# Patient Record
Sex: MALE | Race: WHITE | NOT HISPANIC OR LATINO | Employment: OTHER | URBAN - METROPOLITAN AREA
[De-identification: names, ages, dates, MRNs, and addresses within clinical notes are randomized per-mention and may not be internally consistent; named-entity substitution may affect disease eponyms.]

---

## 2017-03-28 ENCOUNTER — ALLSCRIPTS OFFICE VISIT (OUTPATIENT)
Dept: OTHER | Facility: OTHER | Age: 72
End: 2017-03-28

## 2017-03-28 DIAGNOSIS — Z77.090 CONTACT WITH AND (SUSPECTED) EXPOSURE TO ASBESTOS: ICD-10-CM

## 2017-05-10 ENCOUNTER — ALLSCRIPTS OFFICE VISIT (OUTPATIENT)
Dept: OTHER | Facility: OTHER | Age: 72
End: 2017-05-10

## 2017-05-10 DIAGNOSIS — M48.061 SPINAL STENOSIS OF LUMBAR REGION: ICD-10-CM

## 2017-05-10 DIAGNOSIS — M54.12 RADICULOPATHY OF CERVICAL REGION: ICD-10-CM

## 2017-06-07 ENCOUNTER — ALLSCRIPTS OFFICE VISIT (OUTPATIENT)
Dept: OTHER | Facility: OTHER | Age: 72
End: 2017-06-07

## 2017-06-16 ENCOUNTER — HOSPITAL ENCOUNTER (OUTPATIENT)
Dept: MRI IMAGING | Facility: CLINIC | Age: 72
Discharge: HOME | End: 2017-06-16
Payer: COMMERCIAL

## 2017-06-16 ENCOUNTER — APPOINTMENT (OUTPATIENT)
Dept: MRI IMAGING | Facility: CLINIC | Age: 72
End: 2017-06-16
Payer: COMMERCIAL

## 2017-06-16 DIAGNOSIS — M54.12 RADICULOPATHY OF CERVICAL REGION: ICD-10-CM

## 2017-06-16 PROCEDURE — 72141 MRI NECK SPINE W/O DYE: CPT

## 2017-06-22 ENCOUNTER — GENERIC CONVERSION - ENCOUNTER (OUTPATIENT)
Dept: OTHER | Facility: OTHER | Age: 72
End: 2017-06-22

## 2017-06-22 ENCOUNTER — HOSPITAL ENCOUNTER (OUTPATIENT)
Dept: MRI IMAGING | Facility: CLINIC | Age: 72
Discharge: HOME | End: 2017-06-22
Payer: COMMERCIAL

## 2017-06-22 DIAGNOSIS — M48.061 SPINAL STENOSIS OF LUMBAR REGION: ICD-10-CM

## 2017-06-22 PROCEDURE — 72148 MRI LUMBAR SPINE W/O DYE: CPT

## 2017-07-07 ENCOUNTER — ALLSCRIPTS OFFICE VISIT (OUTPATIENT)
Dept: OTHER | Facility: OTHER | Age: 72
End: 2017-07-07

## 2017-07-12 ENCOUNTER — ALLSCRIPTS OFFICE VISIT (OUTPATIENT)
Dept: OTHER | Facility: OTHER | Age: 72
End: 2017-07-12

## 2017-08-15 ENCOUNTER — ALLSCRIPTS OFFICE VISIT (OUTPATIENT)
Dept: OTHER | Facility: OTHER | Age: 72
End: 2017-08-15

## 2017-08-15 DIAGNOSIS — R00.1 BRADYCARDIA: ICD-10-CM

## 2017-08-15 DIAGNOSIS — I35.1 NONRHEUMATIC AORTIC VALVE INSUFFICIENCY: ICD-10-CM

## 2017-08-29 ENCOUNTER — HOSPITAL ENCOUNTER (OUTPATIENT)
Dept: NON INVASIVE DIAGNOSTICS | Facility: CLINIC | Age: 72
Discharge: HOME | End: 2017-08-29
Payer: COMMERCIAL

## 2017-08-29 DIAGNOSIS — R00.1 BRADYCARDIA: ICD-10-CM

## 2017-08-29 DIAGNOSIS — I35.1 NONRHEUMATIC AORTIC VALVE INSUFFICIENCY: ICD-10-CM

## 2017-08-29 PROCEDURE — 93306 TTE W/DOPPLER COMPLETE: CPT

## 2017-10-04 ENCOUNTER — TRANSCRIBE ORDERS (OUTPATIENT)
Dept: LAB | Facility: CLINIC | Age: 72
End: 2017-10-04

## 2017-10-04 ENCOUNTER — ALLSCRIPTS OFFICE VISIT (OUTPATIENT)
Dept: OTHER | Facility: OTHER | Age: 72
End: 2017-10-04

## 2017-10-04 ENCOUNTER — APPOINTMENT (OUTPATIENT)
Dept: LAB | Facility: CLINIC | Age: 72
End: 2017-10-04
Payer: COMMERCIAL

## 2017-10-04 DIAGNOSIS — R30.0 DYSURIA: ICD-10-CM

## 2017-10-04 DIAGNOSIS — R30.0 DYSURIA: Primary | ICD-10-CM

## 2017-10-04 LAB
BACTERIA UR QL AUTO: NORMAL /HPF
BILIRUB UR QL STRIP: NEGATIVE
CLARITY UR: CLEAR
COLOR UR: YELLOW
GLUCOSE UR STRIP-MCNC: NEGATIVE MG/DL
HGB UR QL STRIP.AUTO: NEGATIVE
HYALINE CASTS #/AREA URNS LPF: NORMAL /LPF
KETONES UR STRIP-MCNC: NEGATIVE MG/DL
LEUKOCYTE ESTERASE UR QL STRIP: NEGATIVE
NITRITE UR QL STRIP: NEGATIVE
NON-SQ EPI CELLS URNS QL MICRO: NORMAL /HPF
PH UR STRIP.AUTO: 6 [PH] (ref 4.5–8)
PROT UR STRIP-MCNC: NEGATIVE MG/DL
RBC #/AREA URNS AUTO: NORMAL /HPF
SP GR UR STRIP.AUTO: 1.02 (ref 1–1.03)
UROBILINOGEN UR QL STRIP.AUTO: 1 E.U./DL
WBC #/AREA URNS AUTO: NORMAL /HPF

## 2017-10-04 PROCEDURE — 81001 URINALYSIS AUTO W/SCOPE: CPT | Performed by: INTERNAL MEDICINE

## 2017-10-04 PROCEDURE — 87086 URINE CULTURE/COLONY COUNT: CPT

## 2017-10-05 LAB — BACTERIA UR CULT: NORMAL

## 2017-10-05 NOTE — PROGRESS NOTES
Assessment  1  Dysuria (788 1) (R30 0)   2  Skin rash (782 1) (R21)    Plan  Chronic lumbar radiculopathy    · Hydrocodone-Acetaminophen 5-325 MG Oral Tablet; TAKE 1 TABLET EVERY 4 TO 6 HOURS  AS NEEDED FOR PAIN  Dysuria    · (1) URINALYSIS (will reflex a microscopy if leukocytes, occult blood, protein or nitrites are not within  normal limits); Status:Active; Requested for:04Oct2017;    · (1) URINE CULTURE; Source:Urine, Unspecified Source; Status:Active; Requested for:04Oct2017;   Skin rash    · Nystatin 861860 UNIT/GM External Ointment; APPLY 2-3 TIMES DAILY TO AFFECTED AREA(S)   · 1 - Donato WRIGHT, Abigail Casillas  (Dermatology) Co-Management  *  Status: Hold For - Scheduling  Requested  for: 42HKQ5052  Care Summary provided  : Yes    Discussion/Summary  Discussion Summary:   Urinalysis and culture should be done rated dysuria trial of nystatin for the penile rash; if ineffective referral to Dermatology will be given  laboratory testing  in June 2018 or earlier if needed  pressure is decent  Sodium restriction is in order  History of Present Illness  HPI: CC of intermittent dysuria which is 3weeks old  Last sex 2 sweeks ago Monagomous No oral sex no anal sexitch of the skin of the penis localised under the prepuce on the richt about 20% of the circumferenceand lumbar radiculopathy; takes occasional narcsreports a burning pain associated with classic for vermiculation symptoms extending from the left lateral neck to the dorsal aspect of the trapezius and down the left arm  This has been waxing and waning in intensity and frequency  No obvious precipitant  is superimposed on a chronic bilateral neck pain exacerbated with range of motion and relieved by rest has an ongoing chronic paralumbar pain syndrome which is treated with oxycodone  issues include hypertension associated with palpitations; atenolol was discontinued because he had significant bradycardia  He has chronic rhinitis   He has a history of chronic asbestos exposure but serial imaging shows no interval development suggesting malignancy  Geriatrics Fall, Nutrition, Mobility, Neuropsychological Assessment [place]: Number of falls in the last year were 0  Radiculopathy, Cervical (Follow-Up): The patient is being seen for follow-up of cervical radiculopathy  The patient reports doing poorly  The patient presents with complaints of constant episodes of moderate bilateral lateral worsened neck pain, described as aching and burning, radiating to the left shoulder and left arm  Episodes years ago  The patient presents with complaints of sudden onset of frequent episodes of moderate new onset of upper arm pain  Episodes started about 2 weeks ago  Symptoms are unchanged  The patient presents with complaints of sudden onset of frequent episodes of moderate new onset of upper arm paresthesias  Episodes started about 2 weeks ago  Symptoms are unchanged  Back Pain Lumbar, Chronic (Brief): The patient is being seen for a routine clinic follow-up of chronic low back pain  Symptoms:  back pain,-back stiffness-and-decreased range of motion of the back, but-no leg pain,-no leg numbness-and-no leg weakness  No associated symptoms are reported  Hypertension (Follow-Up): The patient presents for follow-up of essential hypertension  The patient states he has been stable with his blood pressure control since the last visit  Symptoms:   Dysuria:   JEFFRY CARROLL presents with complaints of sudden onset of occasional episodes of mild dysuria  Episodes started about 2 weeks ago  Rash (Brief): The patient is being seen for an initial evaluation of this episode of a rash  Review of Systems  Complete-Male:   Constitutional: no fever-and-no chills  Eyes: no eyesight problems  ENT: no nasal discharge  Cardiovascular: no chest pain-and-no palpitations  Respiratory: no cough-and-no shortness of breath during exertion  Gastrointestinal: no nausea-and-no diarrhea  Genitourinary: no dysuria  Musculoskeletal: arthralgias, but-as noted in HPI-and-no joint swelling  Integumentary: itching, but-as noted in HPI-and-no rashes  Neurological: tingling, but-as noted in HPI,-no dizziness-and-no fainting  The patient presents with complaints of constant episodes of moderate no anxiety  His symptoms are caused by family event  Previous Evaluation: The patient reports he constantly worries about the welfare of his adult daughter  Endocrine: no muscle weakness  Hematologic/Lymphatic: no swollen glands,-no tendency for easy bleeding-and-no tendency for easy bruising  Preventive Quality 65 Older:   Preventive Quality 65 and Older: Falls Risk: The patient fell 0 times in the past 12 months  The patient currently has no urinary incontinence symptoms  Active Problems  1  Abnormal electrocardiogram (794 31) (R94 31)   2  Acquired insufficiency of aortic valve (424 1) (I35 1)   3  Active advance directive (V49 89) (Z78 9)   4  Allergic rhinitis (477 9) (J30 9)   5  Allergic rhinitis due to other allergic trigger, unspecified rhinitis seasonality   6  Aortic valve disorder (424 1) (I35 9)   7  Asbestos exposure (V15 84) (Z77 090)   8  Benign colon polyp (211 3) (K63 5)   9  Bradycardia (427 89) (R00 1)   10  Cervical radiculopathy (723 4) (M54 12)   11  Chronic low back pain (724 2,338 29) (M54 5,G89 29)   12  Chronic lumbar radiculopathy (724 4) (M54 16)   13  Encounter for screening for other nervous system disorder (V80 09) (Z13 858)   14  Encounter for special screening examination for genitourinary disorder (V81 6) (Z13 89)   15  Esophageal reflux (530 81) (K21 9)   16  Essential hypertension (401 9) (I10)   17  Flu vaccine need (V04 81) (Z23)   18  History of sinus bradycardia (V12 59) (Z86 79)   19  Hyperlipidemia, unspecified (272 4) (E78 5)   20  Lumbar spinal stenosis (724 02) (M48 061)   21  Lump In The Throat (784 2)   22   Mild mitral regurgitation (424 0) (I34 0)   23  Need for pneumococcal vaccination (V03 82) (Z23)   24  Need for shingles vaccine (V04 89) (Z23)   25  History of Palpitations (785 1) (R00 2)   26  Peripheral neuropathy (356 9) (G62 9)   27  Rash (782 1) (R21)   28  Skin rash (782 1) (R21)   29  Tobacco use (305 1) (Z72 0)    Past Medical History  1  History of Bradycardia (427 89) (R00 1)   2  History of asbestosis (V12 69) (Z87 09)   3  History of hypertension (V12 59) (Z86 79)   4  History of sinus bradycardia (V12 59) (Z86 79)   5  History of Hyperlipidemia (272 4) (E78 5)   6  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)   7  History of Palpitations (785 1) (R00 2)   8  History of Prostate disorder (602 9) (N42 9)   9  History of Shingles (053 9) (B02 9)    Surgical History  1  History of Appendectomy   2  History of Neuroplasty Decompression Median Nerve At Carpal Tunnel  Surgical History Reviewed: The surgical history was reviewed and updated today  Family History  Father    1  Family history of Colon Cancer (V16 0)   2  Family history of cardiac disorder (V17 49) (Z82 49)   3  Family history of cerebrovascular accident (CVA) (V17 1) (Z82 3)   4  Family history of diabetes mellitus (V18 0) (Z83 3)   5  Family history of hypertension (V17 49) (Z82 49)  Family History Reviewed: The family history was reviewed and updated today  Social History   · Active advance directive (V49 89) (Z78 9)   · Being A Social Drinker   · Former smoker (Z01 85) (P13 598)   ·    · Never Used Drugs   · Tobacco use (305 1) (Z72 0)  Social History Reviewed: The social history was reviewed and updated today  Current Meds   1  Atorvastatin Calcium 10 MG Oral Tablet; TAKE 1 TABLET DAILY; Therapy: 66AAC4293 to (Evaluate:08Dff6199)  Requested for: 05ZXV4458; Last Rx:65Ysf0380   Ordered   2  Billy Aspirin EC Low Dose 81 MG Oral Tablet Delayed Release; TAKE 1 TABLET DAILY; Therapy: 30DTB8563 to Recorded   3   Flonase Allergy Relief 50 MCG/ACT Nasal Suspension; USE 2 SPRAYS IN EACH NOSTRIL ONCE   DAILY; Therapy: 82VPQ8763 to (Last Rx:26Nov2016)  Requested for: 26Nov2016 Ordered   4  Hydrocodone-Acetaminophen 5-325 MG Oral Tablet; TAKE 1 TABLET EVERY 4 TO 6 HOURS AS   NEEDED FOR PAIN;   Therapy: 69WCX7442 to (Evaluate:87Ytc4171); Last Rx:71Pew6295 Ordered   5  Omeprazole 20 MG Oral Capsule Delayed Release; TAKE 1 CAPSULE DAILY; Therapy: 34LGW7580 to (Darleene Appl)  Requested for: 85VSN2053; Last Rx:80Gvu3975   Ordered  Medication List Reviewed: The medication list was reviewed and updated today  Allergies  1  Corticosteroids    Vitals  Vital Signs    Recorded: 09IJH8168 08:03AM   Temperature 98 3 F   Heart Rate 54   Systolic 471   Diastolic 78   Height 5 ft 8 in   Weight 201 lb 8 oz   BMI Calculated 30 64   BSA Calculated 2 05   O2 Saturation 96     Physical Exam    Constitutional   General appearance: No acute distress, well appearing and well nourished  Head and Face   Head and face: Normal     Eyes   Conjunctiva and lids: No erythema, swelling or discharge  Pulmonary   Respiratory effort: No increased work of breathing or signs of respiratory distress  Cardiovascular   Auscultation of heart: Normal rate and rhythm, normal S1 and S2, no murmurs  Pedal pulses: 2+ bilaterally  Examination of extremities for edema and/or varicosities: Normal     Chest   Breasts: Normal, no dimpling or skin changes appreciated  Genitourinary   Penis: Abnormal  -Very localized red slightly scaly rash under the pre process described in the history  Musculoskeletal   Gait and station: Normal     Inspection/palpation of joints, bones, and muscles: Normal     Skin   Skin and subcutaneous tissue: Normal without rashes or lesions  Neurologic   Cranial nerves: Cranial nerves 2-12 intact  Cortical function: Normal mental status      Psychiatric   Judgment and insight: Normal     Orientation to person, place and time: Normal     Recent and remote memory: Intact  Mood and affect: Normal        Health Management  Benign colon polyp   COLONOSCOPY; every 5 years; Next Due: [id number]; Active  Health Maintenance   Medicare Annual Wellness Visit; every 1 year; Last 94XCQ8919; Next Due: 95EJD3124;  Overdue    Signatures   Electronically signed by : KAVIN Gordon ; Oct  4 2017  8:37AM EST                       (Author)

## 2017-12-28 ENCOUNTER — ALLSCRIPTS OFFICE VISIT (OUTPATIENT)
Dept: OTHER | Facility: OTHER | Age: 72
End: 2017-12-28

## 2017-12-29 NOTE — PROGRESS NOTES
Chief Complaint   CC: new patient, rash groin area  History of Present Illness   78-year-old male presents for concerns regarding itching rash that has been going on his scrotum for awhile has used some nystatin ointment without improvement patient also presents for overall checkup no other specific concerns noted      Assessment   Assessed    1  Pruritus scroti (698 1) (L29 1)   2  Screening for skin condition (V82 0) (Z13 89)    Active Problems   Problems    1  Abnormal electrocardiogram (794 31) (R94 31)   2  Acquired insufficiency of aortic valve (424 1) (I35 1)   3  Active advance directive (V49 89) (Z78 9)   4  Allergic rhinitis (477 9) (J30 9)   5  Allergic rhinitis due to other allergic trigger, unspecified rhinitis seasonality   6  Aortic valve disorder (424 1) (I35 9)   7  Asbestos exposure (V15 84) (Z77 090)   8  Benign colon polyp (211 3) (K63 5)   9  Bradycardia (427 89) (R00 1)   10  Cervical radiculopathy (723 4) (M54 12)   11  Chronic low back pain (724 2,338 29) (M54 5,G89 29)   12  Chronic lumbar radiculopathy (724 4) (M54 16)   13  Dysuria (788 1) (R30 0)   14  Encounter for screening for other nervous system disorder (V80 09) (Z13 858)   15  Encounter for special screening examination for genitourinary disorder (V81 6) (Z13 89)   16  Esophageal reflux (530 81) (K21 9)   17  Essential hypertension (401 9) (I10)   18  Flu vaccine need (V04 81) (Z23)   19  History of sinus bradycardia (V12 59) (Z86 79)   20  Hyperlipidemia, unspecified (272 4) (E78 5)   21  Lumbar spinal stenosis (724 02) (M48 061)   22  Lump In The Throat (784 2)   23  Mild mitral regurgitation (424 0) (I34 0)   24  Need for pneumococcal vaccination (V03 82) (Z23)   25  Need for shingles vaccine (V04 89) (Z23)   26  History of Palpitations (785 1) (R00 2)   27  Penile rash (607 9) (R21)   28  Peripheral neuropathy (356 9) (G62 9)   29  Rash (782 1) (R21)   30  Skin rash (782 1) (R21)   31   Tobacco use (305 1) (Z72 0)    Past Medical History   Problems    1  History of Bradycardia (427 89) (R00 1)   2  History of asbestosis (V12 69) (Z87 09)   3  History of hypertension (V12 59) (Z86 79)   4  History of sinus bradycardia (V12 59) (Z86 79)   5  History of Hyperlipidemia (272 4) (E78 5)   6  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)   7  History of Palpitations (785 1) (R00 2)   8  History of Prostate disorder (602 9) (N42 9)   9  History of Shingles (053 9) (B02 9)     The past medical history was reviewed  Surgical History   Problems    1  History of Appendectomy   2  History of Neuroplasty Decompression Median Nerve At Carpal Tunnel     Surgical History reviewed      Family History   Father    1  Family history of Colon Cancer (V16 0)   2  Family history of cardiac disorder (V17 49) (Z82 49)   3  Family history of cerebrovascular accident (CVA) (V17 1) (Z82 3)   4  Family history of diabetes mellitus (V18 0) (Z83 3)   5  Family history of hypertension (V17 49) (Z82 49)  Family History Reviewed- Derm:    Family History was reviewed      Social History   Problems    · Active advance directive (V49 89) (Z78 9)   · Being A Social Drinker   · Former smoker (J91 88) (T37 388)   ·    · Never Used Drugs   · Tobacco use (305 1) (Z72 0)  The social history was reviewed      Current Meds    1  Atorvastatin Calcium 10 MG Oral Tablet; TAKE 1 TABLET DAILY; Therapy: 45ATB2467 to (Evaluate:05Aqi3978)  Requested for: 79WVJ9117; Last     Rx:20Sep2017 Ordered   2  Billy Aspirin EC Low Dose 81 MG Oral Tablet Delayed Release; TAKE 1 TABLET DAILY; Therapy: 01VOZ0729 to Recorded   3  Flonase Allergy Relief 50 MCG/ACT Nasal Suspension; USE 2 SPRAYS IN EACH     NOSTRIL ONCE DAILY; Therapy: 15WII0451 to (Last Rx:26Nov2016)  Requested for: 26Nov2016 Ordered   4  Hydrocodone-Acetaminophen 5-325 MG Oral Tablet; TAKE 1 TABLET EVERY 4 TO 6     HOURS AS NEEDED FOR PAIN;     Therapy: 00NZE6389 to (Evaluate:29Oct2017);  Last Rx:04Oct2017 Ordered   5  Nystatin 897190 UNIT/GM External Ointment; APPLY 2-3 TIMES DAILY TO AFFECTED     AREA(S); Therapy: 41OGA9381 to (Last Rx:04Oct2017)  Requested for: 79LKN8169 Ordered   6  Omeprazole 20 MG Oral Capsule Delayed Release; TAKE 1 CAPSULE DAILY; Therapy: 24STY0572 to (Rodney Luisa)  Requested for: 79VYD3030; Last     Rx:43Fbz4399 Ordered    Review of Systems        Constitutional: Denies constitutional symptoms  Eyes: Denies eye symptoms  ENT:  denies ear symptoms, nasal symptoms, mouth or throat symptoms  Cardiovascular: Denies cardiovascular symptoms  Respiratory: Denies respiratory symptoms  Gastrointestinal: Denies gastrointestinal symptoms  Musculoskeletal: Denies musculoskeletal symptoms  Integumentary: Denies symptoms other than stated above  Neurological: Denies neurologic symptoms  Psychiatric: Denies psychiatric symptoms  Endocrine: Denies endocrine symptoms  Hematologic/Lymphatic: Denies hematologic symptoms  Allergies   Medication    1  Corticosteroids    Physical Exam        Constitutional      General appearance: Appears healthy and well developed  Lymphatic      No visible disturbance  Musculoskeletal      Digits and nails: No clubbing, cyanosis or edema  Cutaneous and nail exam normal        Skin      Scalp skin texture and hair distribution: Normal skin texture on scalp, normal hair distribution  Head: Normal turgor, no rashes, no lesions  Neck: Normal turgor, no rashes, no lesions  Chest: Normal turgor, no rashes, no lesions  Abdomen: Normal turgor, no rashes, no lesions  Genitalia, groin, and buttocks: Abnormal        Back: Normal turgor, no rashes, no lesions  Right upper extremity: Normal turgor, no rashes, no lesions  Left upper extremity: Normal turgor, no rashes, no lesions  Right lower extremity: Normal turgor, no rashes, no lesions         Left lower extremity: Normal turgor, no rashes, no lesions  Neuro/Psych      Alert and oriented x 3  Displays comfort and cooperation during encounterl  Affect is normal        Finding Numerous milia noted on the scrotum some scaling erythema lichenification no real distinct rash noted on his penis   Nothing else of concern noted on complete exam       Plan   Pruritus scroti    · Fluticasone Propionate 0 05 % External Cream; APPLY AND RUB IN A THIN FILM    TO AFFECTED AREA(S) ONCE OR TWICE DAILY  Screening for skin condition    · Follow Up if Not Better Evaluation and Treatment  Follow-up  Status: Complete  Done:    08AFA0100    Discussion/Summary      Pruritus scrotum we discussed the concept of this process as an itch scratch cycle will go ahead and treat with topical steroids to see if we can break this process at present really no distinct rash or infection noted will plan follow-up again if no improvement nothing else of concern on complete exam       Signatures    Electronically signed by : KAVIN Sherman ; Dec 28 2017 12:46PM EST                       (Author)

## 2018-01-12 VITALS
HEART RATE: 44 BPM | HEIGHT: 68 IN | DIASTOLIC BLOOD PRESSURE: 70 MMHG | SYSTOLIC BLOOD PRESSURE: 144 MMHG | WEIGHT: 212 LBS | BODY MASS INDEX: 32.13 KG/M2

## 2018-01-12 VITALS
HEIGHT: 68 IN | WEIGHT: 202.25 LBS | HEART RATE: 44 BPM | BODY MASS INDEX: 30.65 KG/M2 | DIASTOLIC BLOOD PRESSURE: 64 MMHG | SYSTOLIC BLOOD PRESSURE: 122 MMHG

## 2018-01-14 VITALS
DIASTOLIC BLOOD PRESSURE: 86 MMHG | BODY MASS INDEX: 31.41 KG/M2 | HEIGHT: 68 IN | WEIGHT: 207.25 LBS | SYSTOLIC BLOOD PRESSURE: 148 MMHG | HEART RATE: 56 BPM | TEMPERATURE: 98.3 F

## 2018-01-14 VITALS
HEART RATE: 54 BPM | DIASTOLIC BLOOD PRESSURE: 69 MMHG | HEIGHT: 68 IN | OXYGEN SATURATION: 96 % | BODY MASS INDEX: 30.54 KG/M2 | TEMPERATURE: 98.3 F | WEIGHT: 201.5 LBS | SYSTOLIC BLOOD PRESSURE: 142 MMHG

## 2018-01-14 VITALS
BODY MASS INDEX: 30.92 KG/M2 | HEART RATE: 68 BPM | HEIGHT: 68 IN | SYSTOLIC BLOOD PRESSURE: 140 MMHG | TEMPERATURE: 68 F | WEIGHT: 204 LBS | DIASTOLIC BLOOD PRESSURE: 80 MMHG

## 2018-01-14 VITALS
WEIGHT: 205.25 LBS | HEART RATE: 60 BPM | HEIGHT: 68 IN | DIASTOLIC BLOOD PRESSURE: 78 MMHG | SYSTOLIC BLOOD PRESSURE: 142 MMHG | BODY MASS INDEX: 31.11 KG/M2

## 2018-01-15 VITALS
SYSTOLIC BLOOD PRESSURE: 140 MMHG | HEIGHT: 68 IN | WEIGHT: 210.25 LBS | DIASTOLIC BLOOD PRESSURE: 78 MMHG | OXYGEN SATURATION: 97 % | HEART RATE: 47 BPM | BODY MASS INDEX: 31.87 KG/M2 | TEMPERATURE: 98.5 F

## 2018-01-16 NOTE — MISCELLANEOUS
Message   Recorded as Task   Date: 06/22/2017 01:48 PM, Created By: Mary Long   Task Name: Miscellaneous   Assigned To: [facility] es clinical,Team   Regarding Patient: Adrienne Jaquez, Status: Active   Comment:    Soni Driver - 22 Jun 2017 1:48 PM     TASK CREATED  Melodee Lefort from [address] called stating she sent a fax regarding pt's MRI with significant findings  She wants to make sure Dr Melany Holliday sees it  Melodee Lefort can be reached at [phone]  Katelyn Cabezas - 22 Jun 2017 2:22 PM     TASK EDITED   Yumiko Ogden - 22 Jun 2017 2:37 PM     TASK REPLIED TO: Previously Assigned To Yumiko Ogden  REVIEWED  PATIENT IS SCHEDULE FOR OV APPT TO SEE ME ON JULY 7TH TO REVIEW RESULTS        Active Problems    1  Abnormal electrocardiogram (794 31) (R94 31)   2  Acquired insufficiency of aortic valve (424 1) (I35 1)   3  Active advance directive (V49 89) (Z78 9)   4  Allergic rhinitis (477 9) (J30 9)   5  Allergic rhinitis due to other allergic trigger, unspecified rhinitis seasonality (477 8)   (J30 89)   6  Aortic valve disorder (424 1) (I35 9)   7  Asbestos exposure (V15 84) (Z77 090)   8  Benign colon polyp (211 3) (K63 5)   9  Bradycardia (427 89) (R00 1)   10  Cervical radiculopathy (723 4) (M54 12)   11  Chronic low back pain (724 2,338 29) (M54 5,G89 29)   12  Encounter for screening for other nervous system disorder (V80 09) (Z13 858)   13  Encounter for special screening examination for genitourinary disorder (V81 6) (Z13 89)   14  Esophageal reflux (530 81) (K21 9)   15  Essential hypertension (401 9) (I10)   16  Flu vaccine need (V04 81) (Z23)   17  History of sinus bradycardia (V12 59) (Z86 79)   18  Hyperlipidemia, unspecified (272 4) (E78 5)   19  Lumbar spinal stenosis (724 02) (M48 06)   20  Lump In The Throat (784 2)   21  Mild mitral regurgitation (424 0) (I34 0)   22  Need for pneumococcal vaccination (V03 82) (Z23)   23  Need for shingles vaccine (V04 89) (Z23)   24   History of Palpitations (785 1) (R00 2)   25  Peripheral neuropathy (356 9) (G62 9)   26  Rash (782 1) (R21)   27  Skin rash (782 1) (R21)   28  Tobacco use (305 1) (Z72 0)    Current Meds   1  Atenolol 25 MG Oral Tablet; TAKE 1/2 TABLET BY MOUTH EVERY DAY; Therapy: 91ZSH9675 to (Evaluate:18May2017)  Requested for: 03KYQ0384; Last   Rx:18Jan2017 Ordered   2  Atorvastatin Calcium 10 MG Oral Tablet; TAKE 1 TABLET DAILY; Therapy: 58EMT0105 to (Evaluate:09Sep2017)  Requested for: 91ULC5696; Last   Rx:13Mar2017 Ordered   3  Billy Aspirin EC Low Dose 81 MG Oral Tablet Delayed Release; TAKE 1 TABLET DAILY; Therapy: 09MAC9651 to Recorded   4  Flonase Allergy Relief 50 MCG/ACT Nasal Suspension (Fluticasone Propionate); USE 2   SPRAYS IN EACH NOSTRIL ONCE DAILY; Therapy: 94NHR3832 to (Last Rx:26Nov2016)  Requested for: 26Nov2016 Ordered   5  Gabapentin 300 MG Oral Capsule; take 1 tab at bed time for 3 days; then twice a day   from day 4-6; then three times a day from day 7 onward; Therapy: 94JTV2442 to (Evaluate:98Cpi6863)  Requested for: 91SOW5702; Last   Rx:07Jun2017 Ordered   6  Omeprazole 20 MG Oral Capsule Delayed Release; TAKE 1 CAPSULE DAILY; Therapy: 97NKJ0763 to ([facility])  Requested for: 29IMJ9407; Last   Rx:10May2017 Ordered   7  Oxycodone-Acetaminophen 5-325 MG Oral Tablet (Percocet); TAKE 1 TABLET 3 times   daily; Therapy: 92EZY7517 to (Evaluate:09Jun2017); Last Rx:10May2017 Ordered    Allergies    1   Corticosteroids    Signatures   Electronically signed by : Kasandra Tristan RN; Jun 22 2017  2:38PM EST                       (Author)

## 2018-02-14 ENCOUNTER — OFFICE VISIT (OUTPATIENT)
Dept: CARDIOLOGY CLINIC | Facility: CLINIC | Age: 73
End: 2018-02-14
Payer: COMMERCIAL

## 2018-02-14 VITALS
SYSTOLIC BLOOD PRESSURE: 160 MMHG | HEART RATE: 54 BPM | HEIGHT: 68 IN | BODY MASS INDEX: 31.61 KG/M2 | WEIGHT: 208.6 LBS | DIASTOLIC BLOOD PRESSURE: 80 MMHG | OXYGEN SATURATION: 98 %

## 2018-02-14 DIAGNOSIS — E78.00 PURE HYPERCHOLESTEROLEMIA: ICD-10-CM

## 2018-02-14 DIAGNOSIS — R00.1 BRADYCARDIA: ICD-10-CM

## 2018-02-14 DIAGNOSIS — I10 ESSENTIAL HYPERTENSION: Primary | ICD-10-CM

## 2018-02-14 PROCEDURE — 99214 OFFICE O/P EST MOD 30 MIN: CPT | Performed by: INTERNAL MEDICINE

## 2018-02-14 RX ORDER — ASPIRIN 81 MG/1
1 TABLET ORAL DAILY
COMMUNITY
Start: 2015-01-22 | End: 2022-01-19 | Stop reason: DRUGHIGH

## 2018-02-14 RX ORDER — FLUTICASONE PROPIONATE 50 MCG
2 SPRAY, SUSPENSION (ML) NASAL DAILY
COMMUNITY
Start: 2016-11-26 | End: 2019-12-26 | Stop reason: SDUPTHER

## 2018-02-14 RX ORDER — ATORVASTATIN CALCIUM 10 MG/1
1 TABLET, FILM COATED ORAL DAILY
COMMUNITY
Start: 2015-01-22 | End: 2018-08-24 | Stop reason: SDUPTHER

## 2018-02-14 RX ORDER — LISINOPRIL 5 MG/1
5 TABLET ORAL DAILY
Qty: 30 TABLET | Refills: 5 | Status: SHIPPED | OUTPATIENT
Start: 2018-02-14 | End: 2018-02-20

## 2018-02-14 RX ORDER — HYDROCODONE BITARTRATE AND ACETAMINOPHEN 5; 325 MG/1; MG/1
1 TABLET ORAL
COMMUNITY
Start: 2017-07-12 | End: 2018-04-26

## 2018-02-14 RX ORDER — OMEPRAZOLE 20 MG/1
1 CAPSULE, DELAYED RELEASE ORAL AS NEEDED
COMMUNITY
Start: 2014-06-09 | End: 2018-10-19 | Stop reason: SDUPTHER

## 2018-02-14 NOTE — PATIENT INSTRUCTIONS
Add lisinopril 5 mg daily  Monitor blood pressure at home; report any blood pressures lower than 368 systolic early  Continue all other medications  Report any symptoms  Return in 4 months

## 2018-02-14 NOTE — PROGRESS NOTES
Cardiology Follow Up    Loraine Winter  1945  [id number]  [address]  [facility] [address] [place] [id number]    1  Essential hypertension     2  Bradycardia     3  Pure hypercholesterolemia       Chief Complaint   Patient presents with    Follow-up       Interval History:  Patient presents for follow-up visit with history of hypertension, bradycardia, hyperlipidemia  Patient states he is feeling well  He denies any chest pain with exertion  He admits to some right-sided chest pain that is worse with twisting or movement  He states he is very active and plans to golf every day in the summer  He denies any shortness of breath, dizziness, palpitations, syncope  He is taking all medications without side effects  He follows with Dr Giselle Conley  He is a former patient of Dr Gilberto Leo  Past Medical History:   Diagnosis Date    Abnormal EKG     History of sinus bradycardia     Hyperlipidemia     Hypertension     Spinal stenosis      Social History     Social History    Marital status: /Civil Union     Spouse name: N/A    Number of children: N/A    Years of education: N/A     Occupational History    Not on file       Social History Main Topics    Smoking status: Former Smoker    Smokeless tobacco: Never Used    Alcohol use Yes    Drug use: No    Sexual activity: Not on file     Other Topics Concern    Not on file     Social History Narrative    No narrative on file      Family History   Problem Relation Age of Onset    Transient ischemic attack Father      Past Surgical History:   Procedure Laterality Date    APPENDECTOMY      TONSILLECTOMY         Current Outpatient Prescriptions:     aspirin (ECOTRIN LOW STRENGTH) 81 mg EC tablet, Take 1 tablet by mouth daily, Disp: , Rfl:     atorvastatin (LIPITOR) 10 mg tablet, Take 1 tablet by mouth daily, Disp: , Rfl:     fluticasone (FLONASE ALLERGY RELIEF) 50 mcg/act nasal spray, 2 sprays into each nostril daily, Disp: , Rfl:     HYDROcodone-acetaminophen (NORCO) 5-325 mg per tablet, Take 1 tablet by mouth, Disp: , Rfl:     omeprazole (PriLOSEC) 20 mg delayed release capsule, Take 1 capsule by mouth as needed, Disp: , Rfl:   No Known Allergies    /80   Pulse (!) 54   Ht 5' 8" (1 727 m)   Wt 94 6 kg (208 lb 9 6 oz)   SpO2 98%   BMI 31 72 kg/m²     Imaging: No results found  Review of Systems:  Review of Systems   Constitutional: Negative  HENT: Negative  Respiratory: Negative  Cardiovascular: Negative  Right sided chest pain worse with twisting, movement   Neurological: Negative  Hematological: Negative  All other systems reviewed and are negative  Physical Exam:  Physical Exam   Constitutional: He is oriented to person, place, and time  He appears well-developed and well-nourished  HENT:   Head: Normocephalic and atraumatic  Neck: Normal range of motion  Cardiovascular: Normal rate, regular rhythm and normal heart sounds  Pulmonary/Chest: Effort normal and breath sounds normal    Neurological: He is alert and oriented to person, place, and time  Skin: Skin is warm and dry  Psychiatric: He has a normal mood and affect  His behavior is normal  Judgment and thought content normal    Nursing note and vitals reviewed  Discussion/Summary:  1- htn; elevated at todays visit, add lisinopril 5mg qd  Monitor blood pressures at home  Advised to report any blood pressures that are systolic we lower than 498 consistently  2- bradycardia; sinus heart rates in the 50s  Continue all medications  Avoid rate slowing medications  3- hyperlipidemia; last LDL November 2017 was 92    Continue with Lipitor  Return in 4 months

## 2018-02-20 ENCOUNTER — TELEPHONE (OUTPATIENT)
Dept: CARDIOLOGY CLINIC | Facility: CLINIC | Age: 73
End: 2018-02-20

## 2018-02-20 ENCOUNTER — HOSPITAL ENCOUNTER (EMERGENCY)
Facility: HOSPITAL | Age: 73
Discharge: HOME | End: 2018-02-20
Attending: EMERGENCY MEDICINE | Admitting: EMERGENCY MEDICINE
Payer: COMMERCIAL

## 2018-02-20 VITALS
WEIGHT: 211.2 LBS | HEIGHT: 68 IN | TEMPERATURE: 98.1 F | RESPIRATION RATE: 17 BRPM | SYSTOLIC BLOOD PRESSURE: 131 MMHG | HEART RATE: 57 BPM | OXYGEN SATURATION: 96 % | DIASTOLIC BLOOD PRESSURE: 72 MMHG | BODY MASS INDEX: 32.01 KG/M2

## 2018-02-20 DIAGNOSIS — T78.3XXA ACE INHIBITOR-AGGRAVATED ANGIOEDEMA, INITIAL ENCOUNTER: Primary | ICD-10-CM

## 2018-02-20 DIAGNOSIS — T46.4X5A ACE INHIBITOR-AGGRAVATED ANGIOEDEMA, INITIAL ENCOUNTER: Primary | ICD-10-CM

## 2018-02-20 DIAGNOSIS — R42 LIGHTHEADEDNESS: ICD-10-CM

## 2018-02-20 LAB
ALBUMIN SERPL BCP-MCNC: 4.2 G/DL (ref 3.5–5)
ALP SERPL-CCNC: 36 U/L (ref 46–116)
ALT SERPL W P-5'-P-CCNC: 34 U/L (ref 12–78)
ANION GAP SERPL CALCULATED.3IONS-SCNC: 8 MMOL/L (ref 4–13)
AST SERPL W P-5'-P-CCNC: 18 U/L (ref 5–45)
ATRIAL RATE: 52 BPM
BASOPHILS # BLD AUTO: 0.04 THOUSANDS/ΜL (ref 0–0.1)
BASOPHILS NFR BLD AUTO: 1 % (ref 0–1)
BILIRUB SERPL-MCNC: 0.3 MG/DL (ref 0.2–1)
BUN SERPL-MCNC: 18 MG/DL (ref 5–25)
CALCIUM SERPL-MCNC: 9.7 MG/DL (ref 8.3–10.1)
CHLORIDE SERPL-SCNC: 103 MMOL/L (ref 100–108)
CO2 SERPL-SCNC: 31 MMOL/L (ref 21–32)
CREAT SERPL-MCNC: 1.2 MG/DL (ref 0.6–1.3)
EOSINOPHIL # BLD AUTO: 0.1 THOUSAND/ΜL (ref 0–0.61)
EOSINOPHIL NFR BLD AUTO: 1 % (ref 0–6)
ERYTHROCYTE [DISTWIDTH] IN BLOOD BY AUTOMATED COUNT: 12.6 % (ref 11.6–15.1)
GFR SERPL CREATININE-BSD FRML MDRD: 60 ML/MIN/1.73SQ M
GLUCOSE SERPL-MCNC: 97 MG/DL (ref 65–140)
HCT VFR BLD AUTO: 45.7 % (ref 36.5–49.3)
HGB BLD-MCNC: 15.3 G/DL (ref 12–17)
LYMPHOCYTES # BLD AUTO: 1.9 THOUSANDS/ΜL (ref 0.6–4.47)
LYMPHOCYTES NFR BLD AUTO: 26 % (ref 14–44)
MCH RBC QN AUTO: 30.5 PG (ref 26.8–34.3)
MCHC RBC AUTO-ENTMCNC: 33.5 G/DL (ref 31.4–37.4)
MCV RBC AUTO: 91 FL (ref 82–98)
MONOCYTES # BLD AUTO: 0.72 THOUSAND/ΜL (ref 0.17–1.22)
MONOCYTES NFR BLD AUTO: 10 % (ref 4–12)
NEUTROPHILS # BLD AUTO: 4.47 THOUSANDS/ΜL (ref 1.85–7.62)
NEUTS SEG NFR BLD AUTO: 62 % (ref 43–75)
NRBC BLD AUTO-RTO: 0 /100 WBCS
P AXIS: 41 DEGREES
PLATELET # BLD AUTO: 219 THOUSANDS/UL (ref 149–390)
PMV BLD AUTO: 9.6 FL (ref 8.9–12.7)
POTASSIUM SERPL-SCNC: 4.2 MMOL/L (ref 3.5–5.3)
PR INTERVAL: 222 MS
PROT SERPL-MCNC: 8 G/DL (ref 6.4–8.2)
QRS AXIS: -13 DEGREES
QRSD INTERVAL: 108 MS
QT INTERVAL: 426 MS
QTC INTERVAL: 396 MS
RBC # BLD AUTO: 5.01 MILLION/UL (ref 3.88–5.62)
SODIUM SERPL-SCNC: 142 MMOL/L (ref 136–145)
T WAVE AXIS: 21 DEGREES
TROPONIN I SERPL-MCNC: <0.02 NG/ML
VENTRICULAR RATE: 52 BPM
WBC # BLD AUTO: 7.26 THOUSAND/UL (ref 4.31–10.16)

## 2018-02-20 PROCEDURE — 85025 COMPLETE CBC W/AUTO DIFF WBC: CPT | Performed by: EMERGENCY MEDICINE

## 2018-02-20 PROCEDURE — 99284 EMERGENCY DEPT VISIT MOD MDM: CPT

## 2018-02-20 PROCEDURE — 80053 COMPREHEN METABOLIC PANEL: CPT | Performed by: EMERGENCY MEDICINE

## 2018-02-20 PROCEDURE — 96374 THER/PROPH/DIAG INJ IV PUSH: CPT

## 2018-02-20 PROCEDURE — 93005 ELECTROCARDIOGRAM TRACING: CPT

## 2018-02-20 PROCEDURE — 84484 ASSAY OF TROPONIN QUANT: CPT | Performed by: EMERGENCY MEDICINE

## 2018-02-20 PROCEDURE — 93010 ELECTROCARDIOGRAM REPORT: CPT | Performed by: INTERNAL MEDICINE

## 2018-02-20 PROCEDURE — 36415 COLL VENOUS BLD VENIPUNCTURE: CPT | Performed by: EMERGENCY MEDICINE

## 2018-02-20 RX ORDER — DIPHENHYDRAMINE HYDROCHLORIDE 50 MG/ML
25 INJECTION INTRAMUSCULAR; INTRAVENOUS ONCE
Status: COMPLETED | OUTPATIENT
Start: 2018-02-20 | End: 2018-02-20

## 2018-02-20 RX ADMIN — DIPHENHYDRAMINE HYDROCHLORIDE 25 MG: 50 INJECTION, SOLUTION INTRAMUSCULAR; INTRAVENOUS at 17:12

## 2018-02-20 NOTE — TELEPHONE ENCOUNTER
PT SAID HIS TONGUE IS SWOLLEN AND HIS SHOULDERS HURT  PT FEELS WEAK FROM LISINOPRIL  I SPOKE TO GINO AND DR LINK AND THEY BOTH SAID THE PT NEEDS TO GO TO THE Er  I TOLD THE PT AND HE SAID HE WILL GO TO  [facility] Wilbert Perez

## 2018-02-20 NOTE — ED PROVIDER NOTES
History  Chief Complaint   Patient presents with    Allergic Reaction     Patient states he was just placed on lisinopril by his physician the other day and is now c/o feeling his tongue swell  Patient states he called his primary care physician and was advised to come to the department for further evaluation  Patient is a 48-year-old male  He has been on lisinopril  Today he developed swelling to the left posterior part of his tongue  No other intraoral swelling  He does not have any trouble breathing or swallowing  He has no rash  He has no itching  He has never had ACE-inhibitor angioedema before  He also reports that he has been feeling lightheaded  Not near syncopal   Not vertigo  He does report having some anxiety this morning  No chest pain, shortness of breath or palpitations  Patient has an allergy to steroids  Prior to Admission Medications   Prescriptions Last Dose Informant Patient Reported? Taking? HYDROcodone-acetaminophen (NORCO) 5-325 mg per tablet   Yes No   Sig: Take 1 tablet by mouth   aspirin (ECOTRIN LOW STRENGTH) 81 mg EC tablet   Yes No   Sig: Take 1 tablet by mouth daily   atorvastatin (LIPITOR) 10 mg tablet   Yes No   Sig: Take 1 tablet by mouth daily   fluticasone (FLONASE ALLERGY RELIEF) 50 mcg/act nasal spray   Yes No   Si sprays into each nostril daily   omeprazole (PriLOSEC) 20 mg delayed release capsule   Yes No   Sig: Take 1 capsule by mouth as needed      Facility-Administered Medications: None       Past Medical History:   Diagnosis Date    Abnormal EKG     History of sinus bradycardia     Hyperlipidemia     Hypertension     Spinal stenosis        Past Surgical History:   Procedure Laterality Date    APPENDECTOMY      TONSILLECTOMY         Family History   Problem Relation Age of Onset    Transient ischemic attack Father      I have reviewed and agree with the history as documented      Social History   Substance Use Topics    Smoking status: Former Smoker    Smokeless tobacco: Never Used    Alcohol use Yes      Comment: socially        Review of Systems   Constitutional: Negative for chills and fever  HENT: Negative for rhinorrhea and sore throat  Eyes: Negative for pain, redness and visual disturbance  Respiratory: Negative for cough and shortness of breath  Cardiovascular: Negative for chest pain and leg swelling  Gastrointestinal: Negative for abdominal pain, diarrhea and vomiting  Endocrine: Negative for polydipsia and polyuria  Genitourinary: Negative for dysuria, frequency and hematuria  Musculoskeletal: Negative for back pain and neck pain  Skin: Negative for rash and wound  Allergic/Immunologic: Negative for immunocompromised state  Neurological: Positive for light-headedness  Negative for weakness, numbness and headaches  Psychiatric/Behavioral: Negative for hallucinations and suicidal ideas  All other systems reviewed and are negative  Physical Exam  ED Triage Vitals   Temperature Pulse Respirations Blood Pressure SpO2   02/20/18 1630 02/20/18 1630 02/20/18 1630 02/20/18 1630 02/20/18 1630   98 1 °F (36 7 °C) 62 16 (!) 179/77 97 %      Temp Source Heart Rate Source Patient Position - Orthostatic VS BP Location FiO2 (%)   02/20/18 1916 02/20/18 1630 02/20/18 1630 02/20/18 1700 --   Oral Monitor Sitting Right arm       Pain Score       02/20/18 1730       No Pain           Orthostatic Vital Signs  Vitals:    02/20/18 1830 02/20/18 1900 02/20/18 1915 02/20/18 1930   BP: 121/65 125/70  131/72   Pulse: 60 (!) 54 65 57   Patient Position - Orthostatic VS: Lying   Lying       Physical Exam   Constitutional: He is oriented to person, place, and time  He appears well-developed and well-nourished  No distress  HENT:   Head: Normocephalic and atraumatic  Moist mucous membranes  There is a small amount of swelling to the left lateral posterior tongue  Rest of the oropharynx is without swelling    The posterior pharynx is normal  No drooling  No respiratory distress  Eyes: Conjunctivae are normal  Right eye exhibits no discharge  Left eye exhibits no discharge  No scleral icterus  Neck: Normal range of motion  Neck supple  Cardiovascular: Normal rate, regular rhythm, normal heart sounds and intact distal pulses  Exam reveals no gallop and no friction rub  No murmur heard  Pulmonary/Chest: Effort normal and breath sounds normal  No respiratory distress  He has no wheezes  He has no rales  Abdominal: Soft  Bowel sounds are normal  He exhibits no distension  There is no tenderness  There is no rebound and no guarding  Musculoskeletal: Normal range of motion  He exhibits no edema, tenderness or deformity  No CVA tenderness  No calf pain  Neurological: He is alert and oriented to person, place, and time  He has normal strength  No sensory deficit  GCS eye subscore is 4  GCS verbal subscore is 5  GCS motor subscore is 6  Skin: Skin is warm and dry  No rash noted  He is not diaphoretic  Psychiatric: He has a normal mood and affect  His behavior is normal    Vitals reviewed  ED Medications  Medications   diphenhydrAMINE (BENADRYL) injection 25 mg (25 mg Intravenous Given 2/20/18 1712)       Diagnostic Studies  Results Reviewed     Procedure Component Value Units Date/Time    Troponin I [[id number]]  (Normal) Collected:  02/20/18 1709    Lab Status:  Final result Specimen:  Blood from Arm, Left Updated:  02/20/18 1928     Troponin I <0 02 ng/mL     Narrative:         [employer] Chemistry analyzer 99% cutoff is > 0 04 ng/mL in network labs    o cTnI 99% cutoff is useful only when applied to patients in the clinical setting of myocardial ischemia  o cTnI 99% cutoff should be interpreted in the context of clinical history, ECG findings and possibly cardiac imaging to establish correct diagnosis    o cTnI 99% cutoff may be suggestive but clearly not indicative of a coronary event without the clinical setting of myocardial ischemia  Comprehensive metabolic panel [[id number]]  (Abnormal) Collected:  02/20/18 1709    Lab Status:  Final result Specimen:  Blood from Arm, Left Updated:  02/20/18 1738     Sodium 142 mmol/L      Potassium 4 2 mmol/L      Chloride 103 mmol/L      CO2 31 mmol/L      Anion Gap 8 mmol/L      BUN 18 mg/dL      Creatinine 1 20 mg/dL      Glucose 97 mg/dL      Calcium 9 7 mg/dL      AST 18 U/L      ALT 34 U/L      Alkaline Phosphatase 36 (L) U/L      Total Protein 8 0 g/dL      Albumin 4 2 g/dL      Total Bilirubin 0 30 mg/dL      eGFR 60 ml/min/1 73sq m     Narrative:         National Kidney Disease Education Program recommendations are as follows:  GFR calculation is accurate only with a steady state creatinine  Chronic Kidney disease less than 60 ml/min/1 73 sq  meters  Kidney failure less than 15 ml/min/1 73 sq  meters      CBC and differential [[id number]]  (Normal) Collected:  02/20/18 1709    Lab Status:  Final result Specimen:  Blood from Arm, Left Updated:  02/20/18 1721     WBC 7 26 Thousand/uL      RBC 5 01 Million/uL      Hemoglobin 15 3 g/dL      Hematocrit 45 7 %      MCV 91 fL      MCH 30 5 pg      MCHC 33 5 g/dL      RDW 12 6 %      MPV 9 6 fL      Platelets 940 Thousands/uL      nRBC 0 /100 WBCs      Neutrophils Relative 62 %      Lymphocytes Relative 26 %      Monocytes Relative 10 %      Eosinophils Relative 1 %      Basophils Relative 1 %      Neutrophils Absolute 4 47 Thousands/µL      Lymphocytes Absolute 1 90 Thousands/µL      Monocytes Absolute 0 72 Thousand/µL      Eosinophils Absolute 0 10 Thousand/µL      Basophils Absolute 0 04 Thousands/µL                  No orders to display              Procedures  ECG 12 Lead Documentation  Date/Time: 2/20/2018 5:19 PM  Performed by: Aries Davis  Authorized by: Aries Davis     ECG reviewed by me, the ED Provider: yes    Patient location:  ED  Interpretation:     Interpretation: non-specific    Ectopy:     Ectopy: none ST segments:     ST segments:  Normal  T waves:     T waves: normal    Comments:      Sinus bradycardia 52 beats per minute  First-degree AV block  Phone Contacts  ED Phone Contact    ED Course  ED Course as of Feb 20 2334   Tue Feb 20, 2018   1908 Eosinophils Absolute: 0 10                               MDM  Number of Diagnoses or Management Options  Diagnosis management comments: Patient feels better after Benadryl  Most likely this is ACE-inhibitor angioedema  There is no difficulty breathing or swallowing  The etiology lightheadedness is unclear  EKG is without arrhythmia or ischemia  Laboratory evaluation is normal   Vital signs are normal  Patient appropriate for discharge and further evaluation and treatment as an outpatient by his primary MD   Recommended discontinuing lisinopril for now  Amount and/or Complexity of Data Reviewed  Clinical lab tests: ordered and reviewed  Independent visualization of images, tracings, or specimens: yes      CritCare Time    Disposition  Final diagnoses:   ACE inhibitor-aggravated angioedema, initial encounter   Lightheadedness     Time reflects when diagnosis was documented in both MDM as applicable and the Disposition within this note     Time User Action Codes Description Comment    2/20/2018  7:38 PM Hernan Forrest 89  3XXA,  T46 [id number]] ACE inhibitor-aggravated angioedema, initial encounter     2/20/2018  7:38 PM Sharita Smith Add [R42] [address]       ED Disposition     ED Disposition Condition Comment    Discharge  [address] discharge to home/self care      Condition at discharge: Good        Follow-up Information     Follow up With Specialties Details Why Tammie Yancey MD Internal Medicine In 2 days  [address] [place] [address]  [phone]          Discharge Medication List as of 2/20/2018  7:40 PM      CONTINUE these medications which have NOT CHANGED    Details   aspirin (ECOTRIN LOW STRENGTH) 81 mg EC tablet Take 1 tablet by mouth daily, Starting Thu 1/22/2015, Historical Med      atorvastatin (LIPITOR) 10 mg tablet Take 1 tablet by mouth daily, Starting Thu 1/22/2015, Historical Med      fluticasone (FLONASE ALLERGY RELIEF) 50 mcg/act nasal spray 2 sprays into each nostril daily, Starting Sat 11/26/2016, Historical Med      HYDROcodone-acetaminophen (NORCO) 5-325 mg per tablet Take 1 tablet by mouth, Starting Wed 7/12/2017, Historical Med      omeprazole (PriLOSEC) 20 mg delayed release capsule Take 1 capsule by mouth as needed, Starting Mon 6/9/2014, Historical Med           No discharge procedures on file      ED Provider  Electronically Signed by           Barry Pham MD  02/20/18 5809

## 2018-02-21 NOTE — ED NOTES
D/c instructions and rx reviewed w/ pt  All questions and concerns addressed at this time         Abner Whipple RN  02/20/18 8805

## 2018-02-21 NOTE — DISCHARGE INSTRUCTIONS
Angioedema   WHAT YOU NEED TO KNOW:   Angioedema is sudden swelling caused by fluid that collects in deep layers of the skin  Swelling occurs most often on the face, lips, tongue, or throat, but it can happen anywhere on the body  Your risk for angioedema increases if you have food or insect allergies or a family history of angioedema  Emotional stress, autoimmune disorders, and medicines, such as ACE inhibitors, also increase your risk  Your symptoms may be mild, or you may develop anaphylaxis  Anaphylaxis is a sudden, life-threatening reaction that needs immediate treatment  DISCHARGE INSTRUCTIONS:   Call 911 for signs or symptoms of anaphylaxis,  such as trouble breathing, swelling in your mouth or throat, or wheezing  You may also have itching, a rash, hives, or feel like you are going to faint  Return to the emergency department if:   · You have sudden behavior changes or irritability  · You are dizzy and your heart is beating faster than usual   Contact your healthcare provider if:   · Your swelling does not improve, even after you take your medicines  · You have questions or concerns about your condition or care  Medicines:   · Antihistamines  decrease mild symptoms such as itching or a rash  · Epinephrine  is used to treat severe allergic reactions such as anaphylaxis  · Steroids: This medicine may be given to decrease redness, pain, and swelling  · Take your medicine as directed  Contact your healthcare provider if you think your medicine is not helping or if you have side effects  Tell him of her if you are allergic to any medicine  Keep a list of the medicines, vitamins, and herbs you take  Include the amounts, and when and why you take them  Bring the list or the pill bottles to follow-up visits  Carry your medicine list with you in case of an emergency    Steps to take for signs or symptoms of anaphylaxis:   · Immediately  give 1 shot of epinephrine only into the outer thigh muscle  · Leave the shot in place  as directed  Your healthcare provider may recommend you leave it in place for up to 10 seconds before you remove it  This helps make sure all of the epinephrine is delivered  · Call 911 and go to the emergency department,  even if the shot improved symptoms  Do not drive yourself  Bring the used epinephrine shot with you  Safety precautions to take if you are at risk for anaphylaxis:   · Keep 2 shots of epinephrine with you at all times  You may need a second shot, because epinephrine only works for about 20 minutes and symptoms may return  Your healthcare provider can show you and family members how to give the shot  Check the expiration date every month and replace it before it expires  · Create an action plan  Your healthcare provider can help you create a written plan that explains the allergy and an emergency plan to treat a reaction  The plan explains when to give a second epinephrine shot if symptoms return or do not improve after the first  Give copies of the action plan and emergency instructions to family members, work and school staff, and  providers  Show them how to give a shot of epinephrine  · Be careful when you exercise  If you have had exercise-induced anaphylaxis, do not exercise right after you eat  Stop exercising right away if you start to develop any signs or symptoms of anaphylaxis  You may first feel tired, warm, or have itchy skin  Hives, swelling, and severe breathing problems may develop if you continue to exercise  · Carry medical alert identification  Wear medical alert jewelry or carry a card that explains the allergy  Ask your healthcare provider where to get these items  · Keep a symptom diary  Include information about how often your symptoms occur, how long they last, and if they are mild or severe  Also keep information on what you ate, what happened, or which medicines you took before the swelling started  · Avoid triggers  Triggers include foods, medicines, and other things that you know cause symptoms  You may need to see a specialist, such as an allergist or dietitian, to learn what to avoid  Follow up with your healthcare provider as directed:  Write down your questions so you remember to ask them during your visits  © 2017 [employer] Information is for End User's use only and may not be sold, redistributed or otherwise used for commercial purposes  All illustrations and images included in CareNotes® are the copyrighted property of A [employer] , [employer]  or [employer]  The above information is an  only  It is not intended as medical advice for individual conditions or treatments  Talk to your doctor, nurse or pharmacist before following any medical regimen to see if it is safe and effective for you  Lightheadedness   WHAT YOU NEED TO KNOW:   Lightheadedness is the feeling that you may faint, but you do not  Your heartbeat may be fast or feel like it flutters  Lightheadedness may occur when you take certain medicines, such as medicine to lower your blood pressure  Dehydration, low sodium, low blood sugar, an abnormal heart rhythm, and anxiety are other common causes  DISCHARGE INSTRUCTIONS:   Return to the emergency department if:   · You have sudden chest pain  · You have trouble breathing or shortness of breath  · You have vision changes, are sweating, and have nausea while you are sitting or lying down  · You feel flushed and your heart is fluttering  · You faint  Contact your healthcare provider if:   · You feel lightheaded often  · Your heart beats faster or slower than usual      · You have questions or concerns about your condition or care  Follow up with your healthcare provider as directed: You may need more tests to help find the cause of your lightheadedness  The tests will help healthcare providers plan the best treatment for you  Write down your questions so you remember to ask them during your visits  Self-care:  Talk with your healthcare provider about these and other ways to manage your symptoms:  · Lie down  when you feel lightheaded, your throat gets tight, or your vision changes  Raise your legs above the level of your heart  · Stand up slowly  Sit on the side of the bed or couch for a few minutes before you stand up  · Take slow, deep breaths when you feel lightheaded  This can help decrease the feeling that you might faint  · Ask if you need to avoid hot baths and saunas  These may make your symptoms worse  Watch for signs of low blood sugar: These include hunger, nervousness, sweating, and fast or fluttery heartbeats  Talk with your healthcare provider about ways to keep your blood sugar level steady  Check your blood pressure often:  You should do this especially if you take medicine to lower your blood pressure  Check your blood pressure when you are lying down and when you are standing  Ask how often to check during the day  Keep a record of your blood pressure numbers  Your healthcare provider may use the record to help plan your treatment  Keep a record of your lightheadedness episodes:  Include your symptoms and your activity before and after the episode  The record can help your healthcare provider find the cause of your lightheadedness and help you manage episodes  © 2017 [employer]  Information is for End User's use only and may not be sold, redistributed or otherwise used for commercial purposes  All illustrations and images included in CareNotes® are the copyrighted property of A D [employer] M , Inc  or [employer]varez  The above information is an  only  It is not intended as medical advice for individual conditions or treatments  Talk to your doctor, nurse or pharmacist before following any medical regimen to see if it is safe and effective for you            Over the counter Benadryl as needed for tongue swelling

## 2018-02-26 ENCOUNTER — OFFICE VISIT (OUTPATIENT)
Dept: INTERNAL MEDICINE CLINIC | Facility: CLINIC | Age: 73
End: 2018-02-26
Payer: COMMERCIAL

## 2018-02-26 VITALS
WEIGHT: 209.4 LBS | OXYGEN SATURATION: 97 % | BODY MASS INDEX: 31.84 KG/M2 | HEART RATE: 72 BPM | DIASTOLIC BLOOD PRESSURE: 84 MMHG | SYSTOLIC BLOOD PRESSURE: 176 MMHG

## 2018-02-26 DIAGNOSIS — J61 ASBESTOSIS (HCC): ICD-10-CM

## 2018-02-26 DIAGNOSIS — I10 ESSENTIAL HYPERTENSION: Primary | ICD-10-CM

## 2018-02-26 DIAGNOSIS — M48.00 SPINAL STENOSIS, UNSPECIFIED SPINAL REGION: ICD-10-CM

## 2018-02-26 DIAGNOSIS — Z86.79 HISTORY OF SINUS BRADYCARDIA: ICD-10-CM

## 2018-02-26 DIAGNOSIS — E78.2 MIXED HYPERLIPIDEMIA: ICD-10-CM

## 2018-02-26 PROBLEM — E78.5 HYPERLIPIDEMIA: Status: ACTIVE | Noted: 2018-02-26

## 2018-02-26 PROBLEM — B02.9 SHINGLES: Status: RESOLVED | Noted: 2018-02-26 | Resolved: 2018-02-26

## 2018-02-26 PROCEDURE — 99213 OFFICE O/P EST LOW 20 MIN: CPT | Performed by: INTERNAL MEDICINE

## 2018-02-26 PROCEDURE — 1101F PT FALLS ASSESS-DOCD LE1/YR: CPT | Performed by: INTERNAL MEDICINE

## 2018-02-26 NOTE — PATIENT INSTRUCTIONS
DASH Eating Plan   WHAT YOU NEED TO KNOW:   What is the DASH Eating Plan? The DASH (Dietary Approaches to Stop Hypertension) Eating Plan is designed to help prevent or lower high blood pressure  It can also help to lower LDL (bad) cholesterol and decrease your risk for heart disease  The plan is low in sodium, sugar, unhealthy fats, and total fat  It is high in potassium, calcium, magnesium, and fiber  These nutrients are added when you eat more fruits, vegetables, and whole grains  What is my sodium limit for each day? Your dietitian will tell you how much sodium is safe for you to have each day  People with high blood pressure should have no more than 1,500 to 2,300 mg of sodium in a day  A teaspoon (tsp) of salt has 2,300 mg of sodium  This may seem like a difficult goal, but small changes to the foods you eat can make a big difference  Your healthcare provider or dietitian can help you create a meal plan that follows your sodium limit  How do I limit sodium? · Read food labels  Food labels can help you choose foods that are low in sodium  The amount of sodium is listed in milligrams (mg)  The % Daily Value (DV) column tells you how much of your daily needs are met by 1 serving of the food for each nutrient listed  Choose foods that have less than 5% of the DV of sodium  These foods are considered low in sodium  Foods that have 20% or more of the DV of sodium are considered high in sodium  Avoid foods that have more than 300 mg of sodium in each serving  Choose foods that say low-sodium, reduced-sodium, or no salt added on the food label  · Avoid salt  Do not salt food at the table, and add very little salt to foods during cooking  Use herbs and spices, such as onions, garlic, and salt-free seasonings to add flavor to foods  Try lemon or lime juice or vinegar to give foods a tart flavor  Use hot peppers or a small amount of hot pepper sauce to add a spicy flavor to foods       · Ask about salt substitutes  Ask your healthcare provider if you may use salt substitutes  Some salt substitutes have ingredients that can be harmful if you have certain health conditions  · Choose foods carefully at restaurants  Meals from restaurants, especially fast food restaurants, are often high in sodium  Some restaurants have nutrition information that tells you the amount of sodium in their foods  Ask to have your food prepared with less, or no salt  What should I know about fats? · Include healthy fats  Examples are unsaturated fats and omega-3 fatty acids  Unsaturated fats are found in soybean, canola, olive, or sunflower oil, and liquid and soft tub margarines  Omega-3 fatty acids are found in fatty fish, such as salmon, tuna, mackerel, and sardines  It is also found in flaxseed oil and ground flaxseed  · Avoid unhealthy fats  Do not eat unhealthy fats, such as saturated fats and trans fats  Saturated fats are found in foods that contain fat from animals  Examples are fatty meats, whole milk, butter, cream, and other dairy foods  It is also found in shortening, stick margarine, palm oil, and coconut oil  Trans fats are found in fried foods, crackers, chips, and baked goods made with margarine or shortening  Which foods should I include? With the DASH eating plan, you need to eat a certain number of servings from each food group  This will help you get enough of certain nutrients and limit others  The amount of servings you should eat depends on how many calories you need  Your dietitian can tell you how many calories you need  The number of servings listed next to the food groups below are for people who need about 2,000 calories each day    · Grains:  6 to 8 servings (3 of these servings should be whole-grain foods)    ¨ 1 slice of whole-grain bread     ¨ 1 ounce of dry cereal    ¨ ½ cup of cooked cereal, pasta, or brown rice    · Vegetables and fruits:  4 to 5 servings of fruits and 4 to 5 servings of vegetables    ¨ 1 medium fruit    ¨ 1 cup of raw leafy vegetable    ¨ ½ cup of frozen, canned (no added salt), or chopped fresh vegetables     ¨ ½ cup of fresh, frozen, dried, or canned fruit (canned in light syrup or fruit juice)    ¨ ½ cup of vegetable or fruit juice    · Dairy:  2 to 3 servings    ¨ 1 cup of nonfat (skim) or 1% milk    ¨ 1½ ounces of fat-free or low-fat, low-sodium cheese    ¨ 6 ounces of nonfat or low-fat yogurt    · Lean meat, poultry, and fish:  6 ounces or less    [employer] (chicken, turkey) with no skin    ¨ Fish (especially fatty fish, such as salmon, fresh tuna, or mackerel)    ¨ Lean beef and pork (loin, round, extra lean hamburger)    ¨ Egg whites and egg substitutes    · Nuts, seeds, and legumes:  4 to 5 servings each week    ¨ ½ cup of cooked beans and peas    ¨ 1½ ounces of unsalted nuts    ¨ 2 tablespoons of peanut butter or seeds    · Sweets and added sugars:  5 or less each week    ¨ 1 tablespoon of sugar, jelly, or jam    ¨ ½ cup of sorbet or gelatin    ¨ 1 cup of lemonade    · Fats:  2 to 3 servings each week    ¨ 1 teaspoon of soft margarine or vegetable oil    ¨ 1 tablespoon of mayonnaise    ¨ 2 tablespoons of salad dressing  Which foods should I avoid?    · Grains:      [employer], such as doughnuts, pastries, cookies, and biscuits (high in fat and sugar)    ¨ Mixes for cornbread and biscuits, packaged foods, such as bread stuffing, rice and pasta mixes, macaroni and cheese, and instant cereals (high in sodium)    · Fruits and vegetables:      ¨ Regular, canned vegetables (high in sodium)    ¨ Sauerkraut, pickled vegetables, and other foods prepared in brine (high in sodium)    ¨ Fried vegetables or vegetables in butter or high-fat sauces    ¨ Fruit in cream or butter sauce (high in fat)    · Dairy:      ¨ Whole milk, 2% milk, and cream (high in fat)    ¨ Regular cheese and processed cheese (high in fat and sodium)    · Meats and protein foods:      ¨ Smoked or cured meat, such as corned beef, seals, ham, hot dogs, and sausage (high in fat and sodium)    ¨ Canned beans and canned meats or spreads, such as potted meats, sardines, anchovies, and imitation seafood (high in sodium)    ¨ Deli or lunch meats, such as bologna, ham, turkey, and roast beef (high in sodium)    ¨ High-fat meat (T-bone steak, regular hamburger, and ribs)    ¨ Whole eggs and egg yolks (high in fat)    · Other:      ¨ Seasonings made with salt, such as garlic salt, celery salt, onion salt, seasoned salt, meat tenderizers, and monosodium glutamate (MSG)    ¨ Miso soup and canned or dried soup mixes (high in sodium)    ¨ Regular soy sauce, barbecue sauce, teriyaki sauce, steak sauce, Worcestershire sauce, and most flavored vinegars (high in sodium)    ¨ Regular condiments, such as mustard, ketchup, and salad dressings (high in sodium)    ¨ Gravy and sauces, such as Blake or cheese sauces (high in sodium and fat)    ¨ Drinks high in sugar, such as soda or fruit drinks    [employer] foods, such as salted chips, popcorn, pretzels, pork rinds, salted crackers, and salted nuts    ¨ Frozen foods, such as dinners, entrees, vegetables with sauces, and breaded meats (high in sodium)  What other guidelines should I follow? · Maintain a healthy weight  Your risk for heart disease is higher if you are overweight  Your healthcare provider may suggest that you lose weight if you are overweight  You can lose weight by eating fewer calories and foods that have added sugars and fat  The DASH meal plan can help you do this  Decrease calories by eating smaller portions at each meal and fewer snacks  Ask your healthcare provider for more information about how to lose weight  · Exercise regularly  Regular exercise can help you reach or maintain a healthy weight  Regular exercise can also help decrease your blood pressure and improve your cholesterol levels  Get 30 minutes or more of moderate exercise each day of the week   To lose weight, get at least 60 minutes of exercise  Talk to your healthcare provider about the best exercise program for you  · Limit alcohol  Women should limit alcohol to 1 drink a day  Men should limit alcohol to 2 drinks a day  A drink of alcohol is 12 ounces of beer, 5 ounces of wine, or 1½ ounces of liquor  Where can I find more information? · [facility] [address]  [address] [address]  [place] [address]  Phone: [phone]  Web Address: [facility] AGREEMENT:   You have the right to help plan your care  Discuss treatment options with your caregivers to decide what care you want to receive  You always have the right to refuse treatment  The above information is an  only  It is not intended as medical advice for individual conditions or treatments  Talk to your doctor, nurse or pharmacist before following any medical regimen to see if it is safe and effective for you  © 2017 [facility] Information is for End User's use only and may not be sold, redistributed or otherwise used for commercial purposes  All illustrations and images included in CareNotes® are the copyrighted property of A D A M , Inc  or Juan Álvarez

## 2018-02-26 NOTE — PROGRESS NOTES
Assessment/Plan:    No problem-specific Assessment & Plan notes found for this encounter  Diagnoses and all orders for this visit:    Essential hypertension    History of sinus bradycardia    Mixed hyperlipidemia    Asbestosis ([address] )    Spinal stenosis, unspecified spinal region          Subjective:      Patient ID: Saida Thrasher is a 67 y o  male  Follow-up visit  Patient had been seen by his cardiologist   Because his blood pressure was high, he was placed on lisinopril  He developed tongue swelling on the drug has been discontinued  He comes in for follow-up today  He had been on lisinopril in the past but it was weaned off due to orthostasis  The angioedema occurred with rechallenge  The following portions of the patient's history were reviewed and updated as appropriate: allergies, current medications, past family history, past medical history, past social history, past surgical history and problem list     Review of Systems   Constitutional: Negative for activity change and appetite change  Respiratory: Negative for chest tightness and shortness of breath  Cardiovascular: Positive for palpitations  Negative for chest pain and leg swelling  Genitourinary: Negative for difficulty urinating  Musculoskeletal: Positive for back pain  Objective:      /86 (BP Location: Left arm, Patient Position: Sitting, Cuff Size: Standard)   Pulse 72   Wt 95 kg (209 lb 6 4 oz)   SpO2 97%   BMI 31 84 kg/m²          Physical Exam   Constitutional: He appears well-developed and well-nourished  HENT:   Head: Normocephalic  Cardiovascular: Normal rate, regular rhythm, normal heart sounds and intact distal pulses  No murmur heard  Pulmonary/Chest: Effort normal and breath sounds normal  No respiratory distress  Skin: Skin is warm and dry

## 2018-03-27 ENCOUNTER — OFFICE VISIT (OUTPATIENT)
Dept: INTERNAL MEDICINE CLINIC | Facility: CLINIC | Age: 73
End: 2018-03-27
Payer: COMMERCIAL

## 2018-03-27 VITALS
SYSTOLIC BLOOD PRESSURE: 148 MMHG | HEIGHT: 68 IN | DIASTOLIC BLOOD PRESSURE: 80 MMHG | BODY MASS INDEX: 31.49 KG/M2 | WEIGHT: 207.8 LBS | HEART RATE: 61 BPM

## 2018-03-27 DIAGNOSIS — I10 ESSENTIAL HYPERTENSION: Primary | ICD-10-CM

## 2018-03-27 DIAGNOSIS — M54.2 CERVICALGIA: ICD-10-CM

## 2018-03-27 DIAGNOSIS — J06.9 VIRAL URI: ICD-10-CM

## 2018-03-27 PROCEDURE — 99214 OFFICE O/P EST MOD 30 MIN: CPT | Performed by: INTERNAL MEDICINE

## 2018-03-27 RX ORDER — METOPROLOL SUCCINATE 25 MG/1
TABLET, EXTENDED RELEASE ORAL
Qty: 30 TABLET | Refills: 0 | Status: SHIPPED | OUTPATIENT
Start: 2018-03-27 | End: 2019-11-04

## 2018-03-27 NOTE — PATIENT INSTRUCTIONS
You have white coat hypertension  Your blood pressure seems to go up with some emotional stress  An ideal treatment for this is a beta blocker and I would like you to try once more the lowest dose of metoprolol, using the once a day drug 25 mg half a tablet a day and monitoring the pulse rate and the blood pressure    The headache is a muscle contraction headache  The ideal treatment for this is massage therapy and heat and range of motion  I do not recommend alprazolam because of the addictive potential and the potential for adverse neurological consequences such as change in mental status and falls      see me back here again in a month or so with a track record of home blood pressure readings and pulse readings but call me if anything bad happens  In particular, if the metoprolol drives the Pulse below 40, stop the drug  Above 50 is acceptable    between 40 and 50 is a grade area; it would depend upon how you feel

## 2018-03-27 NOTE — PROGRESS NOTES
Assessment/Plan:       Diagnoses and all orders for this visit:    Essential hypertension    Viral URI    Cervicalgia              Subjective:      Patient ID: Patricia Jeffries is a 67 y o  male  Follow-up visit of this 74-year-old man   Hypertension: Brought in his blood pressure home cuff today  It appears to be accurate  At the very least, it is not reading low compared to our readings    Today, readings have been varying and here in the office he has gone as high as 158/72  However, at the same time, his home device registered 170/100   in the past he was treated with a beta-blocker but even at a very low dose he developed significant bradycardia  Cervicalgia: The patient in the past several  Weeks has developed acute pain in the occipital part of the neck radiating over the top of the head  He has been to a chiropractor and has treatments there with some improvement  Here on examination today he has tenderness to palpation especially of the lateral left superior trapezius margin extending up into the insertion of the occiput  The following portions of the patient's history were reviewed and updated as appropriate:   He has a past medical history of Abnormal EKG; Asbestosis ([address] ); History of sinus bradycardia; Hyperlipidemia; Hypertension; Prostate disorder; Shingles; and Spinal stenosis  ,   does not have any pertinent problems on file  ,   has a past surgical history that includes Appendectomy; Tonsillectomy; and Neuroplasty / transposition median nerve at carpal tunnel ,  family history includes Colon cancer in his father; Diabetes in his father; Heart disease in his father; Hypertension in his father; Stroke in his father; Transient ischemic attack in his father  ,   reports that he has quit smoking  He has never used smokeless tobacco  He reports that he drinks alcohol  He reports that he does not use drugs  ,  is allergic to corticosteroids and lisinopril     Current Outpatient Prescriptions Medication Sig Dispense Refill    aspirin (ECOTRIN LOW STRENGTH) 81 mg EC tablet Take 1 tablet by mouth daily      atorvastatin (LIPITOR) 10 mg tablet Take 1 tablet by mouth daily      fluticasone (FLONASE ALLERGY RELIEF) 50 mcg/act nasal spray 2 sprays into each nostril daily      HYDROcodone-acetaminophen (NORCO) 5-325 mg per tablet Take 1 tablet by mouth      omeprazole (PriLOSEC) 20 mg delayed release capsule Take 1 capsule by mouth as needed       No current facility-administered medications for this visit  Review of Systems   Constitutional: Negative for activity change and appetite change  Respiratory: Negative for chest tightness and shortness of breath  Cardiovascular: Negative for chest pain, palpitations and leg swelling  Genitourinary: Negative for difficulty urinating  Musculoskeletal: Positive for arthralgias and back pain  Objective:  Vitals:    03/27/18 1820   BP: 148/80   Pulse: 61      Physical Exam   Constitutional: He appears well-developed and well-nourished  HENT:   Head: Normocephalic  Eyes: Pupils are equal, round, and reactive to light  Neck: Normal range of motion  Neck supple  No tracheal deviation present  No thyromegaly present  Cardiovascular: Normal rate, regular rhythm, normal heart sounds and intact distal pulses  No murmur heard  Pulmonary/Chest: Effort normal and breath sounds normal  No stridor  No respiratory distress  Abdominal: Soft  Musculoskeletal: Normal range of motion  Lymphadenopathy:     He has no cervical adenopathy  Neurological: He is alert  He has normal reflexes  Skin: Skin is warm and dry

## 2018-04-26 ENCOUNTER — OFFICE VISIT (OUTPATIENT)
Dept: INTERNAL MEDICINE CLINIC | Facility: CLINIC | Age: 73
End: 2018-04-26
Payer: COMMERCIAL

## 2018-04-26 VITALS
HEART RATE: 60 BPM | RESPIRATION RATE: 14 BRPM | BODY MASS INDEX: 31.55 KG/M2 | HEIGHT: 68 IN | DIASTOLIC BLOOD PRESSURE: 72 MMHG | SYSTOLIC BLOOD PRESSURE: 136 MMHG | WEIGHT: 208.2 LBS

## 2018-04-26 DIAGNOSIS — I10 ESSENTIAL HYPERTENSION: ICD-10-CM

## 2018-04-26 DIAGNOSIS — M48.062 SPINAL STENOSIS OF LUMBAR REGION WITH NEUROGENIC CLAUDICATION: Primary | ICD-10-CM

## 2018-04-26 DIAGNOSIS — J30.89 NON-SEASONAL ALLERGIC RHINITIS, UNSPECIFIED TRIGGER: ICD-10-CM

## 2018-04-26 PROCEDURE — 3078F DIAST BP <80 MM HG: CPT | Performed by: INTERNAL MEDICINE

## 2018-04-26 PROCEDURE — 3075F SYST BP GE 130 - 139MM HG: CPT | Performed by: INTERNAL MEDICINE

## 2018-04-26 PROCEDURE — 99213 OFFICE O/P EST LOW 20 MIN: CPT | Performed by: INTERNAL MEDICINE

## 2018-04-26 RX ORDER — FLUTICASONE PROPIONATE 50 MCG
1 SPRAY, SUSPENSION (ML) NASAL DAILY
Qty: 16 G | Refills: 0 | Status: SHIPPED | OUTPATIENT
Start: 2018-04-26 | End: 2018-08-24 | Stop reason: CLARIF

## 2018-04-26 RX ORDER — HYDROCODONE BITARTRATE AND ACETAMINOPHEN 5; 325 MG/1; MG/1
1 TABLET ORAL EVERY 6 HOURS PRN
Qty: 120 TABLET | Refills: 0 | Status: SHIPPED | OUTPATIENT
Start: 2018-04-26 | End: 2018-08-24 | Stop reason: SDUPTHER

## 2018-04-26 NOTE — PATIENT INSTRUCTIONS
Blood pressure and pulse are well controlled via by an itty-bitty dose of metoprolol  ! Neuro surgical referral for ongoing lumbar spinal stenosis with neurogenic claudication  Here follow-up in about 6 months

## 2018-04-26 NOTE — PROGRESS NOTES
Assessment/Plan:       Diagnoses and all orders for this visit:    Spinal stenosis of lumbar region with neurogenic claudication  -     HYDROcodone-acetaminophen (NORCO) 5-325 mg per tablet; Take 1 tablet by mouth every 6 (six) hours as needed for pain Max Daily Amount: 4 tablets  -     Ambulatory referral to Neurosurgery; Future    Non-seasonal allergic rhinitis, unspecified trigger  -     fluticasone (FLONASE) 50 mcg/act nasal spray; 1 spray into each nostril daily    Essential hypertension              Subjective:      Patient ID: Gale Maldonado is a 67 y o  male  The patient had hypertension with hypertensive headache  He was started on a minute dose of metoprolol in light of prior failure due to bradycardia and this has worked well  Home blood pressure readings are good, pulse is less than 80, headache is gone and blood pressure is controlled  His pulse is not bradycardic however and he has no lightheadedness or dizziness  Continues to complain of lumbar pain with bilateral neurogenic claudication and weakness  Will refer to Neurosurgery for assessment        The following portions of the patient's history were reviewed and updated as appropriate:   He has a past medical history of Abnormal EKG; Asbestosis ([address] ); History of sinus bradycardia; Hyperlipidemia; Hypertension; Prostate disorder; Shingles; and Spinal stenosis  ,   does not have any pertinent problems on file  ,   has a past surgical history that includes Appendectomy; Tonsillectomy; and Neuroplasty / transposition median nerve at carpal tunnel ,  family history includes Colon cancer in his father; Diabetes in his father; Heart disease in his father; Hypertension in his father; Stroke in his father; Transient ischemic attack in his father  ,   reports that he has quit smoking  He has never used smokeless tobacco  He reports that he drinks alcohol  He reports that he does not use drugs  ,  is allergic to corticosteroids and lisinopril     Current Outpatient Prescriptions   Medication Sig Dispense Refill    aspirin (ECOTRIN LOW STRENGTH) 81 mg EC tablet Take 1 tablet by mouth daily      atorvastatin (LIPITOR) 10 mg tablet Take 1 tablet by mouth daily      fluticasone (FLONASE ALLERGY RELIEF) 50 mcg/act nasal spray 2 sprays into each nostril daily      metoprolol succinate (TOPROL-XL) 25 mg 24 hr tablet 1/2 tablet daily 30 tablet 0    omeprazole (PriLOSEC) 20 mg delayed release capsule Take 1 capsule by mouth as needed      fluticasone (FLONASE) 50 mcg/act nasal spray 1 spray into each nostril daily 16 g 0    HYDROcodone-acetaminophen (NORCO) 5-325 mg per tablet Take 1 tablet by mouth every 6 (six) hours as needed for pain Max Daily Amount: 4 tablets 120 tablet 0     No current facility-administered medications for this visit  Review of Systems   Constitutional: Negative for activity change and appetite change  Respiratory: Negative for chest tightness and shortness of breath  Cardiovascular: Negative for chest pain  Musculoskeletal: Positive for back pain and gait problem  Neurological: Positive for weakness and numbness  Negative for dizziness and headaches  Objective:  Vitals:    04/26/18 0752   BP: 136/72   Pulse: 60   Resp: 14      Physical Exam   Constitutional: He is oriented to person, place, and time  He appears well-developed and well-nourished  No distress  Pulmonary/Chest: Effort normal    Musculoskeletal: He exhibits no deformity  Neurological: He is alert and oriented to person, place, and time  Psychiatric: He has a normal mood and affect   His behavior is normal  Thought content normal

## 2018-05-14 ENCOUNTER — TELEPHONE (OUTPATIENT)
Dept: INTERNAL MEDICINE CLINIC | Facility: CLINIC | Age: 73
End: 2018-05-14

## 2018-05-14 NOTE — TELEPHONE ENCOUNTER
CALLED PT  , TO SEE WHAT BREATHING PROBLEMS HE IS HAVING , STATED HE IS ABLE TO BREATHE, JUST FEELS HEAVY, AND DOES NOT FEEL RIGHT, AND STATED HAS HAD THIS PROBLEM BEFORE WHEN HE USED THIS MED-PLEASE ADVISE

## 2018-05-14 NOTE — TELEPHONE ENCOUNTER
CALLED PT  NOT AVAILABLE SPOKE TO WIFE AND WAS NOTIFIED TO STOP METOPROLOL AND CALL US BACK TOMORROW TO LET US KNOW HOW HE IS FEELING

## 2018-05-14 NOTE — TELEPHONE ENCOUNTER
PT  MIRIAN      RX  METOPROLOL  HAS  BEEN  GIVING  HIM  A  BREATHING  PROBLEM   WANTS  TO KNOW  IF  HE  CAN  GET  ANOTHER  RX   [facility]  BLAKELEE ANY  QUESTIONS  CALL PT  [phone]

## 2018-05-18 ENCOUNTER — TELEPHONE (OUTPATIENT)
Dept: INTERNAL MEDICINE CLINIC | Facility: CLINIC | Age: 73
End: 2018-05-18

## 2018-08-24 ENCOUNTER — OFFICE VISIT (OUTPATIENT)
Dept: INTERNAL MEDICINE CLINIC | Facility: CLINIC | Age: 73
End: 2018-08-24
Payer: COMMERCIAL

## 2018-08-24 VITALS
OXYGEN SATURATION: 97 % | BODY MASS INDEX: 30.1 KG/M2 | HEART RATE: 63 BPM | SYSTOLIC BLOOD PRESSURE: 156 MMHG | WEIGHT: 198.6 LBS | DIASTOLIC BLOOD PRESSURE: 80 MMHG | HEIGHT: 68 IN

## 2018-08-24 DIAGNOSIS — M48.062 SPINAL STENOSIS OF LUMBAR REGION WITH NEUROGENIC CLAUDICATION: ICD-10-CM

## 2018-08-24 DIAGNOSIS — R01.1 HEART MURMUR: ICD-10-CM

## 2018-08-24 DIAGNOSIS — I10 ESSENTIAL HYPERTENSION: ICD-10-CM

## 2018-08-24 DIAGNOSIS — J30.89 NON-SEASONAL ALLERGIC RHINITIS, UNSPECIFIED TRIGGER: ICD-10-CM

## 2018-08-24 DIAGNOSIS — E78.2 MIXED HYPERLIPIDEMIA: ICD-10-CM

## 2018-08-24 DIAGNOSIS — N42.9 PROSTATE DISORDER: ICD-10-CM

## 2018-08-24 DIAGNOSIS — E78.00 HIGH CHOLESTEROL: Primary | ICD-10-CM

## 2018-08-24 DIAGNOSIS — Z12.11 COLON CANCER SCREENING: ICD-10-CM

## 2018-08-24 PROCEDURE — 99214 OFFICE O/P EST MOD 30 MIN: CPT | Performed by: INTERNAL MEDICINE

## 2018-08-24 RX ORDER — HYDROCODONE BITARTRATE AND ACETAMINOPHEN 5; 325 MG/1; MG/1
1 TABLET ORAL EVERY 6 HOURS PRN
Qty: 120 TABLET | Refills: 0 | Status: SHIPPED | OUTPATIENT
Start: 2018-08-24 | End: 2018-08-24 | Stop reason: SDUPTHER

## 2018-08-24 RX ORDER — HYDROCODONE BITARTRATE AND ACETAMINOPHEN 5; 325 MG/1; MG/1
1 TABLET ORAL EVERY 6 HOURS PRN
Qty: 120 TABLET | Refills: 0 | Status: SHIPPED | OUTPATIENT
Start: 2018-08-24 | End: 2018-09-07

## 2018-08-24 RX ORDER — ATORVASTATIN CALCIUM 10 MG/1
10 TABLET, FILM COATED ORAL DAILY
Qty: 90 TABLET | Refills: 3 | Status: SHIPPED | OUTPATIENT
Start: 2018-08-24 | End: 2019-10-07 | Stop reason: SDUPTHER

## 2018-08-24 NOTE — PATIENT INSTRUCTIONS
Blood pressure is higher in the office then at home  I am willing to accept the diagnosis of white coat hypertension as long as the echocardiogram stays normal   You do of a slight heart murmur; recheck the echo  Routine screening labs  Continue to follow with Urology for prostate health  Follow-up visit with me in 6 months  Influenza vaccines recommended in the fall

## 2018-08-24 NOTE — PROGRESS NOTES
Assessment/Plan:       Diagnoses and all orders for this visit:    High cholesterol  -     atorvastatin (LIPITOR) 10 mg tablet; Take 1 tablet (10 mg total) by mouth daily  -     CBC and differential; Future  -     Lipid Panel with Direct LDL reflex; Future  -     Comprehensive metabolic panel; Future  -     TSH, 3rd generation with Free T4 reflex; Future  -     Urinalysis with reflex to microscopic  -     Echo complete with contrast if indicated; Future    Spinal stenosis of lumbar region with neurogenic claudication  -     HYDROcodone-acetaminophen (NORCO) 5-325 mg per tablet; Take 1 tablet by mouth every 6 (six) hours as needed for pain Earliest Fill Date: 8/24/18 Max Daily Amount: 4 tablets    Non-seasonal allergic rhinitis, unspecified trigger    Essential hypertension  -     CBC and differential; Future  -     Lipid Panel with Direct LDL reflex; Future  -     Comprehensive metabolic panel; Future  -     TSH, 3rd generation with Free T4 reflex; Future  -     Urinalysis with reflex to microscopic  -     Echo complete with contrast if indicated; Future    Mixed hyperlipidemia  -     CBC and differential; Future  -     Lipid Panel with Direct LDL reflex; Future  -     Comprehensive metabolic panel; Future  -     TSH, 3rd generation with Free T4 reflex; Future  -     Urinalysis with reflex to microscopic  -     Echo complete with contrast if indicated; Future    Colon cancer screening    Prostate disorder    Heart murmur  -     Echo complete with contrast if indicated; Future          Patient Instructions   Blood pressure is higher in the office then at home  I am willing to accept the diagnosis of white coat hypertension as long as the echocardiogram stays normal   You do of a slight heart murmur; recheck the echo  Routine screening labs  Continue to follow with Urology for prostate health  Follow-up visit with me in 6 months  Influenza vaccines recommended in the fall          Subjective:      Patient ID: Dustin Dill Prime is a 75 y o  male  A 50-year-old man who feels well except for the fact that he continues to complain of lumbar pain with bilateral neurogenic claudication and weakness  Blood pressure:  Checks it at home in his normal   Here it is a bit higher  I had given him a dose of metoprolol in number of years ago but he got excessively bradycardic so he does not want to take medication if he can help  Echocardiogram was done in 2014 and 2017 were normal    Hyperlipidemia is treated    Prostate health followed by urologist he does have BPH    Asbestos exposure but no symptoms  The following portions of the patient's history were reviewed and updated as appropriate:   He has a past medical history of Abnormal EKG; Asbestosis ([address] ); History of sinus bradycardia; Hyperlipidemia; Prostate disorder; Shingles; and Spinal stenosis  ,   does not have any pertinent problems on file  ,   has a past surgical history that includes Appendectomy; Tonsillectomy; and Neuroplasty / transposition median nerve at carpal tunnel ,  family history includes Colon cancer in his father; Diabetes in his father; Heart disease in his father; Hypertension in his father; Stroke in his father; Transient ischemic attack in his father  ,   reports that he has quit smoking  He has never used smokeless tobacco  He reports that he drinks alcohol  He reports that he does not use drugs  ,  is allergic to corticosteroids and lisinopril     Current Outpatient Prescriptions   Medication Sig Dispense Refill    aspirin (ECOTRIN LOW STRENGTH) 81 mg EC tablet Take 1 tablet by mouth daily      atorvastatin (LIPITOR) 10 mg tablet Take 1 tablet (10 mg total) by mouth daily 90 tablet 3    fluticasone (FLONASE ALLERGY RELIEF) 50 mcg/act nasal spray 2 sprays into each nostril daily      HYDROcodone-acetaminophen (NORCO) 5-325 mg per tablet Take 1 tablet by mouth every 6 (six) hours as needed for pain Earliest Fill Date: 8/24/18 Max Daily Amount: 4 tablets 120 tablet 0    metoprolol succinate (TOPROL-XL) 25 mg 24 hr tablet 1/2 tablet daily 30 tablet 0    omeprazole (PriLOSEC) 20 mg delayed release capsule Take 1 capsule by mouth as needed      OXYCODONE HCL PO        No current facility-administered medications for this visit  Review of Systems   Constitutional: Negative for chills and fever  HENT: Negative for sore throat and trouble swallowing  Eyes: Negative for pain  Respiratory: Negative for cough and shortness of breath  Cardiovascular: Negative for chest pain and palpitations  Gastrointestinal: Negative for abdominal pain, blood in stool, diarrhea, nausea and vomiting  Endocrine: Negative for cold intolerance and heat intolerance  Genitourinary: Negative for dysuria, frequency and testicular pain  Musculoskeletal: Negative for joint swelling  Allergic/Immunologic: Negative for immunocompromised state  Neurological: Negative for dizziness, syncope and headaches  Hematological: Negative for adenopathy  Does not bruise/bleed easily  Psychiatric/Behavioral: Negative for dysphoric mood  The patient is not nervous/anxious  Objective:  Vitals:    08/24/18 1431   BP: 156/80   Pulse: 63   SpO2: 97%      Physical Exam   Constitutional: He is oriented to person, place, and time  He appears well-developed and well-nourished  HENT:   Head: Normocephalic and atraumatic  Eyes: Pupils are equal, round, and reactive to light  Neck: Normal range of motion  Neck supple  No tracheal deviation present  No thyromegaly present  Cardiovascular: Normal rate and regular rhythm  Exam reveals no gallop  Murmur heard  Decrescendo systolic murmur is present with a grade of 1/6   Grade 1/6 early decrescendo murmur heard best right sternal border 2nd intercostal space without radiation   Pulmonary/Chest: Effort normal  No respiratory distress  He has no wheezes  He has no rales  Abdominal: Soft   Bowel sounds are normal  There is no tenderness  Musculoskeletal: Normal range of motion  He exhibits no tenderness or deformity  Neurological: He is alert and oriented to person, place, and time  He has normal reflexes  Coordination normal    Skin: Skin is warm and dry  Psychiatric: He has a normal mood and affect

## 2018-09-07 ENCOUNTER — OFFICE VISIT (OUTPATIENT)
Dept: INTERNAL MEDICINE CLINIC | Facility: CLINIC | Age: 73
End: 2018-09-07
Payer: COMMERCIAL

## 2018-09-07 VITALS
DIASTOLIC BLOOD PRESSURE: 80 MMHG | HEIGHT: 68 IN | OXYGEN SATURATION: 97 % | BODY MASS INDEX: 30.43 KG/M2 | HEART RATE: 54 BPM | SYSTOLIC BLOOD PRESSURE: 154 MMHG | WEIGHT: 200.8 LBS

## 2018-09-07 DIAGNOSIS — M48.062 SPINAL STENOSIS OF LUMBAR REGION WITH NEUROGENIC CLAUDICATION: ICD-10-CM

## 2018-09-07 DIAGNOSIS — M48.062 SPINAL STENOSIS OF LUMBAR REGION WITH NEUROGENIC CLAUDICATION: Primary | ICD-10-CM

## 2018-09-07 PROCEDURE — 3008F BODY MASS INDEX DOCD: CPT | Performed by: INTERNAL MEDICINE

## 2018-09-07 PROCEDURE — 99213 OFFICE O/P EST LOW 20 MIN: CPT | Performed by: INTERNAL MEDICINE

## 2018-09-07 PROCEDURE — 4040F PNEUMOC VAC/ADMIN/RCVD: CPT | Performed by: INTERNAL MEDICINE

## 2018-09-07 RX ORDER — OXYCODONE HYDROCHLORIDE 5 MG/1
5 TABLET ORAL EVERY 4 HOURS PRN
Qty: 200 TABLET | Refills: 0 | Status: SHIPPED | OUTPATIENT
Start: 2018-09-07 | End: 2018-09-07 | Stop reason: SDUPTHER

## 2018-09-07 RX ORDER — OXYCODONE HYDROCHLORIDE 5 MG/1
5 TABLET ORAL EVERY 4 HOURS PRN
Qty: 180 TABLET | Refills: 0 | Status: SHIPPED | OUTPATIENT
Start: 2018-09-07 | End: 2018-10-17 | Stop reason: SDUPTHER

## 2018-09-07 RX ORDER — CELECOXIB 200 MG/1
200 CAPSULE ORAL 2 TIMES DAILY
Qty: 60 CAPSULE | Refills: 1 | Status: SHIPPED | OUTPATIENT
Start: 2018-09-07 | End: 2018-10-17 | Stop reason: SDUPTHER

## 2018-09-07 NOTE — PROGRESS NOTES
Assessment/Plan:       Diagnoses and all orders for this visit:    Spinal stenosis of lumbar region with neurogenic claudication  -     celecoxib (CeleBREX) 200 mg capsule; Take 1 capsule (200 mg total) by mouth 2 (two) times a day  -     Ambulatory referral to Neurosurgery; Future  -     oxyCODONE (ROXICODONE) 5 mg immediate release tablet; Take 1 tablet (5 mg total) by mouth every 4 (four) hours as needed for moderate pain Max Daily Amount: 30 mg          Patient Instructions   Persistent sciatic pain in a patient with neurogenic claudication do the lumbar spinal stenosis  Localization most likely L4-L5  Failure of low-dose narcotic pain medications; intolerant of corticosteroids  At least temporarily, fairly high dose nonsteroidal anti-inflammatory drug Celebrex 200 mg p o  b i d   Also, oxycodone 5 mg 1 or 2 tablets every 4-6 hours as needed for pain  Referral to a neurosurgeon  Subjective:      Patient ID: Allison Altamirano is a 75 y o  male  Sciatic pain right leg brought on after playing golf is persisted for 1 week in spite of 2 chiropractic treatments  Patient taking Tylenol plus codeine but it is completely ineffective    No lumbar spinal stenosis but symptoms have not been so active lately  Examination of reflexes confirms diagnosis; right leg patella reflex 2+ and Achilles reflex 1+  Left leg patellar reflex 3+ and Achilles reflex 2+  Lumbar MRI done July 2018 confirms anatomical findings consistent with this diagnosis also  A problem regarding his therapy is that he apparently has adverse reaction to corticosteroid; gets rashes and hives  The following portions of the patient's history were reviewed and updated as appropriate:   He has a past medical history of Abnormal EKG; Asbestosis ([address] ); History of sinus bradycardia; Hyperlipidemia; Prostate disorder; Shingles; and Spinal stenosis  ,   does not have any pertinent problems on file  ,   has a past surgical history that includes Appendectomy; Tonsillectomy; and Neuroplasty / transposition median nerve at carpal tunnel ,  family history includes Colon cancer in his father; Diabetes in his father; Heart disease in his father; Hypertension in his father; Stroke in his father; Transient ischemic attack in his father  ,   reports that he has quit smoking  He has never used smokeless tobacco  He reports that he drinks alcohol  He reports that he does not use drugs  ,  is allergic to corticosteroids and lisinopril     Current Outpatient Prescriptions   Medication Sig Dispense Refill    aspirin (ECOTRIN LOW STRENGTH) 81 mg EC tablet Take 1 tablet by mouth daily      atorvastatin (LIPITOR) 10 mg tablet Take 1 tablet (10 mg total) by mouth daily 90 tablet 3    fluticasone (FLONASE ALLERGY RELIEF) 50 mcg/act nasal spray 2 sprays into each nostril daily      metoprolol succinate (TOPROL-XL) 25 mg 24 hr tablet 1/2 tablet daily 30 tablet 0    omeprazole (PriLOSEC) 20 mg delayed release capsule Take 1 capsule by mouth as needed      celecoxib (CeleBREX) 200 mg capsule Take 1 capsule (200 mg total) by mouth 2 (two) times a day 60 capsule 1    oxyCODONE (ROXICODONE) 5 mg immediate release tablet Take 1 tablet (5 mg total) by mouth every 4 (four) hours as needed for moderate pain Max Daily Amount: 30 mg 200 tablet 0     No current facility-administered medications for this visit  Review of Systems   Musculoskeletal: Positive for back pain  Neurological:        Right-sided sciatic pain         Objective:  Vitals:    09/07/18 0753   BP: 154/80   Pulse: (!) 54   SpO2: 97%      Physical Exam   Constitutional: He appears well-developed and well-nourished  Pulmonary/Chest: Effort normal    Musculoskeletal:   The patient walks preferentially hunched forward bend at the waist about 10°  Subjectively this improves his pain   Neurological: He is alert     Reflex Scores:       Patellar reflexes are 2+ on the right side and 3+ on the left side        Achilles reflexes are 1+ on the right side and 2+ on the left side

## 2018-09-07 NOTE — PATIENT INSTRUCTIONS
Persistent sciatic pain in a patient with neurogenic claudication do the lumbar spinal stenosis  Localization most likely L4-L5  Failure of low-dose narcotic pain medications; intolerant of corticosteroids  At least temporarily, fairly high dose nonsteroidal anti-inflammatory drug Celebrex 200 mg p o  b i d   Also, oxycodone 5 mg 1 or 2 tablets every 4-6 hours as needed for pain  Referral to a neurosurgeon

## 2018-09-07 NOTE — TELEPHONE ENCOUNTER
Pharmacy received  RX today for oxycodone for 200 pills        But it is exceeding the limit needs new RX for 180 pills

## 2018-10-17 ENCOUNTER — EVALUATION (OUTPATIENT)
Dept: PHYSICAL THERAPY | Facility: CLINIC | Age: 73
End: 2018-10-17
Payer: COMMERCIAL

## 2018-10-17 DIAGNOSIS — M48.062 SPINAL STENOSIS OF LUMBAR REGION WITH NEUROGENIC CLAUDICATION: ICD-10-CM

## 2018-10-17 DIAGNOSIS — M54.5 LOW BACK PAIN, UNSPECIFIED BACK PAIN LATERALITY, UNSPECIFIED CHRONICITY, WITH SCIATICA PRESENCE UNSPECIFIED: Primary | ICD-10-CM

## 2018-10-17 PROCEDURE — 97162 PT EVAL MOD COMPLEX 30 MIN: CPT

## 2018-10-17 PROCEDURE — 97535 SELF CARE MNGMENT TRAINING: CPT

## 2018-10-17 PROCEDURE — G8979 MOBILITY GOAL STATUS: HCPCS

## 2018-10-17 PROCEDURE — G8978 MOBILITY CURRENT STATUS: HCPCS

## 2018-10-17 NOTE — PROGRESS NOTES
PT Evaluation     Today's date: 10/17/2018  Patient name: rGaeme Chamberlain  : 1945  MRN: [id number]  Referring provider: Alyssa Ayala MD  Dx:   Encounter Diagnosis     ICD-10-CM    1  Low back pain, unspecified back pain laterality, unspecified chronicity, with sciatica presence unspecified M54 5                   Assessment  Impairments: abnormal or restricted ROM, impaired physical strength, lacks appropriate home exercise program and pain with function    Assessment details: Pt presents with chronic LBP with symptoms to BLE with right more symptomatic than left  He reports heavy/tired feeling in trunk/lower body with activity  Continues to try to remain as active as possible  Reports medication is helping control symptoms  No injections noted  PT notes limited strength, ROM/mobility of trunk and BLE  Pt will benefit from PT tx to decrease symptoms and restore normal functional level  Prognosis: good    Goals  ST  Decrease pain 50% 4 wk  2  Increase trunk ROM by 10-15 degree  4 wk  3  Increase trunk strength to F+/G  4 wk  4  Increase BLE strength to 5/5 4 wk  LT  Pt will report 75% or greater decrease in pain 8 wk  2  Increase trunk ROM to WNL 8 wk  3  Increase trunk strength to WNL 8 wk  4  Pt will report no limitations with ADL's 8 wk  5    Pt will report no limitations with ambulation 8 wk    Plan  Patient would benefit from: PT eval  Planned modality interventions: cryotherapy, thermotherapy: paraffin bath, ultrasound and unattended electrical stimulation  Planned therapy interventions: abdominal trunk stabilization, joint mobilization, manual therapy, strengthening, stretching, therapeutic activities, therapeutic exercise, flexibility, functional ROM exercises and home exercise program  Frequency: 2x week  Duration in weeks: 8  Treatment plan discussed with: patient        Subjective Evaluation    History of Present Illness  Mechanism of injury: Pt presents history chronic LBP  MRI revealed stenosis and degenerative changes  Reports radiating pain at times to groin region and BLE  Right LE more symptomatic than left  Reports recent 2 week period of severe symptoms but this has subsided  Reports trying to keep high activity level, (hiking/hunting/golf/walking)  Reports medication helps with symptoms  Pain across back and into RLE  Pain disrupts sleep  No injections to low back  Reports BLE fatigue with ambulation  Reports "totally exhausted feeling lower body"  Denies any bowel/bladder dysfunction  Pain  Current pain ratin  At best pain ratin  At worst pain ratin  Location: Low Back and BLE (right>left)  Quality: sharp, dull ache and burning    Exercise history: Walking 2+ miles as possible, hunting daily      Diagnostic Tests  MRI studies: abnormal        Objective     Tenderness     Additional Tenderness Details  No tenderness to palpation noted low back region    Active Range of Motion     Lumbar   Flexion: 45 degrees   Extension: 5 degrees   Left lateral flexion: 13 degrees with pain  Right lateral flexion: 17 degrees with pain    Additional Active Range of Motion Details  Reports "ache/tightness" low back with all movements  Trunk rotation decreased 25% bilaterally    Strength/Myotome Testing     Left Hip   Planes of Motion   Flexion: 4+  Extension: 4+  Abduction: 4+  Adduction: 4+    Right Hip   Planes of Motion   Flexion: 4+  Extension: 4+  Adduction: 4+    Left Knee   Flexion: 5  Extension: 5    Right Knee   Flexion: 4+  Extension: 5    Left Ankle/Foot   Dorsiflexion: 5  Plantar flexion: 5    Right Ankle/Foot   Dorsiflexion: 5  Plantar flexion: 5    Additional Strength Details  Fair/Fair+ seated resisted trunk strength  No pain noted with MMT    Tests     Lumbar     Left   Positive femoral stretch  Negative passive SLR  Right   Positive femoral stretch  Negative passive SLR       Left Pelvic Girdle/Sacrum   Negative: sacrum compression and gapping  Right Pelvic Girdle/Sacrum   Negative: sacrum compression and gapping  Additional Tests Details  No change in symptoms noted with traction of BLE  Poor BLE mm flexibility noted    Ambulation     Ambulation: Stairs   Ascend stairs: independent  Pattern: reciprocal  Railings: one rail  Descend stairs: independent  Pattern: non-reciprocal  Railings: one rail    Observational Gait   Decreased walking speed and stride length       Additional Observational Gait Details  Trunk rigid      Flowsheet Rows      Most Recent Value   PT/OT G-Codes   Current Score  53   Projected Score  63   Assessment Type  Evaluation   G code set  Mobility: Walking & Moving Around   Mobility: Walking and Moving Around Current Status ()  CK   Mobility: Walking and Moving Around Goal Status ()  CJ          Precautions:  N/A  Specialty Daily Treatment Diary     Manual         BLE/ L-spine                                            Exercise Diary         [address]        Lat Pulldown        Leg Press        Leg Ext        Leg curl        St lateral trunk stretch        PPT        bridges        Abd crunch        abd diagonal        PPU        Prone hip ext        Add sq        t-band hip abd                                                    Modalities        MHP/CP        E-stim        Ultrasound

## 2018-10-17 NOTE — LETTER
2018    Asmita Barillas MD  [address] [facility] [address]    Patient: Sarah Amin   YOB: 1945   Date of Visit: 10/17/2018     Encounter Diagnosis     ICD-10-CM    1  Low back pain, unspecified back pain laterality, unspecified chronicity, with sciatica presence unspecified M54 5        Dear Dr Piper Kelley:    Please review the attached Plan of Care from [address] recent visit  Please verify that you agree therapy should continue by signing the attached document and sending it back to our office  If you have any questions or concerns, please don't hesitate to call  Sincerely,    Grayson Caldwell PT      Referring Provider:      I certify that I have read the below Plan of Care and certify the need for these services furnished under this plan of treatment while under my care  Asmita Barillas MD  [address] [facility] [address]  VIA Facsimile: [phone]          PT Evaluation     Today's date: 10/17/2018  Patient name: Sarah Amin  : 1945  MRN: [id number]  Referring provider: Malgorzata Campos MD  Dx:   Encounter Diagnosis     ICD-10-CM    1  Low back pain, unspecified back pain laterality, unspecified chronicity, with sciatica presence unspecified M54 5                   Assessment  Impairments: abnormal or restricted ROM, impaired physical strength, lacks appropriate home exercise program and pain with function    Assessment details: Pt presents with chronic LBP with symptoms to BLE with right more symptomatic than left  He reports heavy/tired feeling in trunk/lower body with activity  Continues to try to remain as active as possible  Reports medication is helping control symptoms  No injections noted  PT notes limited strength, ROM/mobility of trunk and BLE  Pt will benefit from PT tx to decrease symptoms and restore normal functional level  Prognosis: good    Goals  ST  Decrease pain 50% 4 wk  2  Increase trunk ROM by 10-15 degree  4 wk  3  Increase trunk strength to F+/G  4 wk  4  Increase BLE strength to 5/5 4 wk  LT  Pt will report 75% or greater decrease in pain 8 wk  2  Increase trunk ROM to WNL 8 wk  3  Increase trunk strength to WNL 8 wk  4  Pt will report no limitations with ADL's 8 wk  5  Pt will report no limitations with ambulation 8 wk    Plan  Patient would benefit from: PT eval  Planned modality interventions: cryotherapy, thermotherapy: paraffin bath, ultrasound and unattended electrical stimulation  Planned therapy interventions: abdominal trunk stabilization, joint mobilization, manual therapy, strengthening, stretching, therapeutic activities, therapeutic exercise, flexibility, functional ROM exercises and home exercise program  Frequency: 2x week  Duration in weeks: 8  Treatment plan discussed with: patient        Subjective Evaluation    History of Present Illness  Mechanism of injury: Pt presents history chronic LBP  MRI revealed stenosis and degenerative changes  Reports radiating pain at times to groin region and BLE  Right LE more symptomatic than left  Reports recent 2 week period of severe symptoms but this has subsided  Reports trying to keep high activity level, (hiking/hunting/golf/walking)  Reports medication helps with symptoms  Pain across back and into RLE  Pain disrupts sleep  No injections to low back  Reports BLE fatigue with ambulation  Reports "totally exhausted feeling lower body"  Denies any bowel/bladder dysfunction      Pain  Current pain ratin  At best pain ratin  At worst pain ratin  Location: Low Back and BLE (right>left)  Quality: sharp, dull ache and burning    Exercise history: Walking 2+ miles as possible, hunting daily      Diagnostic Tests  MRI studies: abnormal        Objective     Tenderness     Additional Tenderness Details  No tenderness to palpation noted low back region    Active Range of Motion     Lumbar   Flexion: 45 degrees   Extension: 5 degrees   Left lateral flexion: 13 degrees with pain  Right lateral flexion: 17 degrees with pain    Additional Active Range of Motion Details  Reports "ache/tightness" low back with all movements  Trunk rotation decreased 25% bilaterally    Strength/Myotome Testing     Left Hip   Planes of Motion   Flexion: 4+  Extension: 4+  Abduction: 4+  Adduction: 4+    Right Hip   Planes of Motion   Flexion: 4+  Extension: 4+  Adduction: 4+    Left Knee   Flexion: 5  Extension: 5    Right Knee   Flexion: 4+  Extension: 5    Left Ankle/Foot   Dorsiflexion: 5  Plantar flexion: 5    Right Ankle/Foot   Dorsiflexion: 5  Plantar flexion: 5    Additional Strength Details  Fair/Fair+ seated resisted trunk strength  No pain noted with MMT    Tests     Lumbar     Left   Positive femoral stretch  Negative passive SLR  Right   Positive femoral stretch  Negative passive SLR  Left Pelvic Girdle/Sacrum   Negative: sacrum compression and gapping  Right Pelvic Girdle/Sacrum   Negative: sacrum compression and gapping  Additional Tests Details  No change in symptoms noted with traction of BLE  Poor BLE mm flexibility noted    Ambulation     Ambulation: Stairs   Ascend stairs: independent  Pattern: reciprocal  Railings: one rail  Descend stairs: independent  Pattern: non-reciprocal  Railings: one rail    Observational Gait   Decreased walking speed and stride length       Additional Observational Gait Details  Trunk rigid      Flowsheet Rows      Most Recent Value   PT/OT G-Codes   Current Score  53   Projected Score  63   Assessment Type  Evaluation   G code set  Mobility: Walking & Moving Around   Mobility: Walking and Moving Around Current Status ()  CK   Mobility: Walking and Moving Around Goal Status ()  CJ          Precautions:  N/A  Specialty Daily Treatment Diary     Manual         BLE/ L-spine                                            Exercise Diary         [facility] Lat Pulldown        Leg Press        Leg Ext        Leg curl        St lateral trunk stretch        PPT        bridges        Abd crunch        abd diagonal        PPU        Prone hip ext        Add sq        t-band hip abd                                                    Modalities        MHP/CP        E-stim        Ultrasound

## 2018-10-18 RX ORDER — OXYCODONE HYDROCHLORIDE 5 MG/1
5 TABLET ORAL EVERY 4 HOURS PRN
Qty: 180 TABLET | Refills: 0 | Status: SHIPPED | OUTPATIENT
Start: 2018-10-18 | End: 2019-11-04

## 2018-10-18 RX ORDER — CELECOXIB 200 MG/1
200 CAPSULE ORAL 2 TIMES DAILY
Qty: 60 CAPSULE | Refills: 1 | Status: SHIPPED | OUTPATIENT
Start: 2018-10-18 | End: 2019-11-04

## 2018-10-19 DIAGNOSIS — K21.9 GASTROESOPHAGEAL REFLUX DISEASE, ESOPHAGITIS PRESENCE NOT SPECIFIED: Primary | ICD-10-CM

## 2018-10-19 RX ORDER — OMEPRAZOLE 20 MG/1
20 CAPSULE, DELAYED RELEASE ORAL DAILY
Qty: 90 CAPSULE | Refills: 3 | Status: SHIPPED | OUTPATIENT
Start: 2018-10-19 | End: 2021-03-12 | Stop reason: SDUPTHER

## 2018-10-19 NOTE — TELEPHONE ENCOUNTER
PATIENT CALLED AND SAID THAT HE DOES NOT USE THE CELECOXIB     NEEDS THE [facility],G  [facility]     [phone]

## 2018-10-22 ENCOUNTER — OFFICE VISIT (OUTPATIENT)
Dept: PHYSICAL THERAPY | Facility: CLINIC | Age: 73
End: 2018-10-22
Payer: COMMERCIAL

## 2018-10-22 DIAGNOSIS — M54.5 LOW BACK PAIN, UNSPECIFIED BACK PAIN LATERALITY, UNSPECIFIED CHRONICITY, WITH SCIATICA PRESENCE UNSPECIFIED: Primary | ICD-10-CM

## 2018-10-22 PROCEDURE — 97140 MANUAL THERAPY 1/> REGIONS: CPT

## 2018-10-22 PROCEDURE — 97110 THERAPEUTIC EXERCISES: CPT

## 2018-10-22 NOTE — PROGRESS NOTES
Daily Note     Today's date: 10/22/2018  Patient name: Birgit Dhillon  : 1945  MRN: [id number]  Referring provider: Alex Case MD  Dx:   Encounter Diagnosis     ICD-10-CM    1  Low back pain, unspecified back pain laterality, unspecified chronicity, with sciatica presence unspecified M54 5                   Subjective: The patient states that he is doing alright  Objective: See treatment diary below      Assessment: Tolerated treatment well  Patient exhibited good technique with therapeutic exercises and would benefit from continued PT  The patient did report feeling better post therapy session  The patient did have to leave early  Treatment modified  Plan: Continue per plan of care       Precautions:  N/A  Specialty Daily Treatment Diary      Manual   10/22/18           BLE/ L-spine  15'                                                                         Exercise Diary   10/22/18           nustep  L1 10'           [address]             Lat Pulldown             Leg Press             Leg Ext             Leg curl             St lateral trunk stretch             PPT  20x :05           bridges  30x           Abd crunch             abd diagonal             PPU  10x :10           Prone hip ext  20x           Add sq  30x :05           t-band hip abd  Blue 30x                                                                                       Modalities  10/22/18           MHP/CP  Declined           E-stim             Ultrasound

## 2018-10-24 ENCOUNTER — OFFICE VISIT (OUTPATIENT)
Dept: PHYSICAL THERAPY | Facility: CLINIC | Age: 73
End: 2018-10-24
Payer: COMMERCIAL

## 2018-10-24 DIAGNOSIS — M54.5 LOW BACK PAIN, UNSPECIFIED BACK PAIN LATERALITY, UNSPECIFIED CHRONICITY, WITH SCIATICA PRESENCE UNSPECIFIED: Primary | ICD-10-CM

## 2018-10-24 PROCEDURE — 97110 THERAPEUTIC EXERCISES: CPT

## 2018-10-24 PROCEDURE — 97140 MANUAL THERAPY 1/> REGIONS: CPT

## 2018-10-24 NOTE — PROGRESS NOTES
Daily Note     Today's date: 10/24/2018  Patient name: Hannah Bettencourt  : 1945  MRN: [id number]  Referring provider: Cammie Welsh MD  Dx:   Encounter Diagnosis     ICD-10-CM    1  Low back pain, unspecified back pain laterality, unspecified chronicity, with sciatica presence unspecified M54 5                   Subjective: "I feel pretty good"        Objective: See treatment diary below   Pain in 5/10    Assessment: Tolerated treatment well  Patient exhibited good technique with therapeutic exercises         Plan: Continue per plan of care       Precautions:  N/A  Specialty Daily Treatment Diary      Manual   10/22/18  10/24/18         BLE/ L-spine  15' 15                                                                       Exercise Diary   10/22/18  10/24/18         nustep  L1 10'          [address] [facility]    L2   10         Lat Pulldown             Leg Press             Leg Ext             Leg curl             St lateral trunk stretch             PPT  20x :05  20  5" hold         bridges  30x 30         Abd crunch   20   5"         abd diagonal    10  5"          PPU  10x :10  1 x 10         Prone hip ext  20x  20         Add sq  30x :05  30         t-band hip abd  Blue 30x  30  bl                                                                                     Modalities  10/22/18  10/24/18         MHP/CP  Declined  declined         E-stim             Ultrasound

## 2018-10-31 ENCOUNTER — OFFICE VISIT (OUTPATIENT)
Dept: PHYSICAL THERAPY | Facility: CLINIC | Age: 73
End: 2018-10-31
Payer: COMMERCIAL

## 2018-10-31 DIAGNOSIS — M54.5 LOW BACK PAIN, UNSPECIFIED BACK PAIN LATERALITY, UNSPECIFIED CHRONICITY, WITH SCIATICA PRESENCE UNSPECIFIED: Primary | ICD-10-CM

## 2018-10-31 PROCEDURE — 97140 MANUAL THERAPY 1/> REGIONS: CPT

## 2018-10-31 PROCEDURE — 97110 THERAPEUTIC EXERCISES: CPT

## 2018-10-31 NOTE — PROGRESS NOTES
Daily Note     Today's date: 10/31/2018  Patient name: Teresita Barajas  : 1945  MRN: [id number]  Referring provider: Kadi Calderon MD  Dx:   Encounter Diagnosis     ICD-10-CM    1  Low back pain, unspecified back pain laterality, unspecified chronicity, with sciatica presence unspecified M54 5                   Subjective: "Im ok, I dont know if its helping"     "I took a pain pill this morning, but this weekend wasn't bad"        Objective: See treatment diary below     Pain   4/10    Assessment: Tolerated treatment well  Patient exhibited good technique with therapeutic exercises         No c/o pain with therex  Would benefit from progressive strengthening  Plan: Continue per plan of care     Precautions:  N/A  Specialty Daily Treatment Diary      Manual   10/22/18  10/24/18  10/31/18       BLE/ L-spine  15' 15  15                                                                     Exercise Diary   10/22/18  10/24/18  10/31/18       nustep  L1 10'          [facility]    L2   10  L3 10       Lat Pulldown             Leg Press             Leg Ext             Leg curl             St lateral trunk stretch             PPT  20x :05  20  5" hold  20  5" hold       bridges  30x 30  30       Abd crunch   20   5" 20  5"       abd diagonal    10  5"   20       PPU  10x :10  1 x 10  20  5"       Prone hip ext  20x  20  20       Add sq  30x :05  30  30       t-band hip abd  Blue 30x  30  bl 20  bl                                                                                   Modalities  10/22/18  10/24/18  10/31/18       MHP/CP  Declined  declined  prone  10       E-stim             Ultrasound

## 2018-11-02 ENCOUNTER — APPOINTMENT (OUTPATIENT)
Dept: PHYSICAL THERAPY | Facility: CLINIC | Age: 73
End: 2018-11-02
Payer: COMMERCIAL

## 2018-11-14 ENCOUNTER — OFFICE VISIT (OUTPATIENT)
Dept: PHYSICAL THERAPY | Facility: CLINIC | Age: 73
End: 2018-11-14
Payer: COMMERCIAL

## 2018-11-14 DIAGNOSIS — M54.5 LOW BACK PAIN, UNSPECIFIED BACK PAIN LATERALITY, UNSPECIFIED CHRONICITY, WITH SCIATICA PRESENCE UNSPECIFIED: Primary | ICD-10-CM

## 2018-11-14 PROCEDURE — 97110 THERAPEUTIC EXERCISES: CPT

## 2018-11-14 PROCEDURE — 97140 MANUAL THERAPY 1/> REGIONS: CPT

## 2018-11-14 NOTE — PROGRESS NOTES
Daily Note     Today's date: 2018  Patient name: Cielo Rothman  : 1945  MRN: [id number]  Referring provider: Ellen Coffey MD  Dx:   Encounter Diagnosis     ICD-10-CM    1  Low back pain, unspecified back pain laterality, unspecified chronicity, with sciatica presence unspecified M54 5                   Subjective: "I feel great  I walked 4 miles in the woods today and have no pain"        Objective: See treatment diary below   Pain in 0/10      Assessment: Tolerated treatment well  Patient exhibited good technique with therapeutic exercises     Pt reports significant amount of improvement since IE  Plan: Continue per plan of care         Precautions:  N/A  Specialty Daily Treatment Diary      Manual   10/22/18  10/24/18  10/31/18  11/14/18     BLE/ L-spine  15' 15  15  15                                                                   Exercise Diary   10/22/18  10/24/18  10/31/18  11/14/18     nustep  L1 10'          [address] [facility]    L2   10  L3 10  L3  10     Lat Pulldown             Leg Press             Leg Ext             Leg curl             St lateral trunk stretch             PPT  20x :05  20  5" hold  20  5" hold  30     bridges  30x 30  30 3x10     Abd crunch   20   5" 20  5"  3x10     abd diagonal    10  5"   20  20     PPU  10x :10  1 x 10  20  5"  20     Prone hip ext  20x  20  20  20     Add sq  30x :05  30  30  30     t-band hip abd  Blue 30x  30  bl 20  bl  20                                                                                 Modalities  10/22/18  10/24/18  10/31/18  11/14/18     MHP/CP  Declined  declined  prone  10  declined     E-stim             Ultrasound

## 2018-11-20 ENCOUNTER — OFFICE VISIT (OUTPATIENT)
Dept: PHYSICAL THERAPY | Facility: CLINIC | Age: 73
End: 2018-11-20
Payer: COMMERCIAL

## 2018-11-20 DIAGNOSIS — M54.5 LOW BACK PAIN, UNSPECIFIED BACK PAIN LATERALITY, UNSPECIFIED CHRONICITY, WITH SCIATICA PRESENCE UNSPECIFIED: Primary | ICD-10-CM

## 2018-11-20 PROCEDURE — 97110 THERAPEUTIC EXERCISES: CPT

## 2018-11-20 PROCEDURE — 97140 MANUAL THERAPY 1/> REGIONS: CPT

## 2018-11-20 NOTE — PROGRESS NOTES
Daily Note     Today's date: 2018  Patient name: Hannah Bettencourt  : 1945  MRN: [id number]  Referring provider: Cammie Welsh MD  Dx:   Encounter Diagnosis     ICD-10-CM    1  Low back pain, unspecified back pain laterality, unspecified chronicity, with sciatica presence unspecified M54 5               Subjective: I think I did to much with the ball squeeze ex last visit because I had a lot of L thigh muscle cramping and soreness that night  Objective: See treatment diary below    Assessment: Tolerated treatment well  Added leg press and seated rows with good alli  Tightness with all BLE stretches  Patient would benefit from continued PT    Plan: Continue per plan of care     Precautions:  N/A  Specialty Daily Treatment Diary      Manual   10/22/18  10/24/18  10/31/18  11/14/18  11-20-18   BLE/ L-spine  15' 15  15  15  15'         Exercise Diary   10/22/18  10/24/18  10/31/18  11/14/18  11-20-18   nustep  L1 10'           [address] [facility]    L2   10  L3 10  L3  10  L 3 10'   Lat Pulldown             Seated row     20# 1 x 20   Leg Press         30# 1 x 20   Leg Ext             Leg curl             St lateral trunk stretch             PPT  20x :05  20  5" hold  20  5" hold  30  x 30 5" hold   bridges  30x 30  30 3x10  3 x 10   Abd crunch   20   5" 20  5"  3x10  3 x 10   abd diagonal    10  5"   20  20  20   PPU  10x :10  1 x 10  20  5"  20  20   Prone hip ext  20x  20  20  20  20   Add sq  30x :05  30  30  30  Held   t-band hip abd  Blue 30x  30  bl 20  bl  20  Blue x 20                                                                               Modalities  10/22/18  10/24/18  10/31/18  11/14/18  11-20-18   MHP/CP  Declined  declined  prone  10  declined  Declined   E-stim             Ultrasound

## 2018-11-26 ENCOUNTER — EVALUATION (OUTPATIENT)
Dept: PHYSICAL THERAPY | Facility: CLINIC | Age: 73
End: 2018-11-26
Payer: COMMERCIAL

## 2018-11-26 DIAGNOSIS — M54.5 LOW BACK PAIN, UNSPECIFIED BACK PAIN LATERALITY, UNSPECIFIED CHRONICITY, WITH SCIATICA PRESENCE UNSPECIFIED: Primary | ICD-10-CM

## 2018-11-26 PROCEDURE — G8979 MOBILITY GOAL STATUS: HCPCS

## 2018-11-26 PROCEDURE — 97164 PT RE-EVAL EST PLAN CARE: CPT

## 2018-11-26 PROCEDURE — 97110 THERAPEUTIC EXERCISES: CPT

## 2018-11-26 PROCEDURE — 97140 MANUAL THERAPY 1/> REGIONS: CPT

## 2018-11-26 PROCEDURE — G8978 MOBILITY CURRENT STATUS: HCPCS

## 2018-11-26 NOTE — LETTER
2018    Flaquita Scott MD  [address] [facility] [address]    Patient: Loraine Winter   YOB: 1945   Date of Visit: 2018     Encounter Diagnosis     ICD-10-CM    1  Low back pain, unspecified back pain laterality, unspecified chronicity, with sciatica presence unspecified M54 5        Dear Dr Kassandra Trevino:    Please review the attached Plan of Care from [address] recent visit  Please verify that you agree therapy should continue by signing the attached document and sending it back to our office  If you have any questions or concerns, please don't hesitate to call  Sincerely,    Fernando Whyte PT      Referring Provider:      I certify that I have read the below Plan of Care and certify the need for these services furnished under this plan of treatment while under my care  Flaquita Scott MD  [address] [facility] [address]  VIA Facsimile: [phone]          PT RE-EVALUATION     Today's date: 2018  Patient name: nAdrei David  : 1945  MRN: [id number]  Referring provider: Estrella Duran MD  Dx:   Encounter Diagnosis     ICD-10-CM    1  Low back pain, unspecified back pain laterality, unspecified chronicity, with sciatica presence unspecified M54 5                   Assessment  Assessment details: Pt presents with chronic LBP with symptoms to BLE with right more symptomatic than left  He reports feeling improved with PT tx  He reports no symptoms to BLE at this time  Symptoms primarily low back  Reports increased symptoms after prolonged sitting or after sleep  He reports no difficulty with prolonged ambulation on uneven surfaces  No sleep disruptions noted  Pt would benefit from continued PT tx to decrease symptoms and improve functional level      Impairments: abnormal or restricted ROM, impaired physical strength, lacks appropriate home exercise program and pain with function     Prognosis: good    Goals  ST  Decrease pain 50% 4 wk  2  Increase trunk ROM by 10-15 degree  4 wk  3  Increase trunk strength to F+/G  4 wk  4  Increase BLE strength to 5/5 4 wk  LT  Pt will report 75% or greater decrease in pain 8 wk  2  Increase trunk ROM to WNL 8 wk  3  Increase trunk strength to WNL 8 wk  4  Pt will report no limitations with ADL's 8 wk  5  Pt will report no limitations with ambulation 8 wk    Plan  Patient would benefit from: PT eval  Planned modality interventions: cryotherapy, thermotherapy: paraffin bath, ultrasound and unattended electrical stimulation  Planned therapy interventions: abdominal trunk stabilization, joint mobilization, manual therapy, strengthening, stretching, therapeutic activities, therapeutic exercise, flexibility, functional ROM exercises and home exercise program  Frequency: 2x week  Duration in weeks: 8  Treatment plan discussed with: patient        Subjective Evaluation    History of Present Illness  Mechanism of injury: Pt presents history chronic LBP  MRI revealed stenosis and degenerative changes  Reports radiating pain at times to groin region and BLE  Right LE more symptomatic than left  Reports recent 2 week period of severe symptoms but this has subsided  Reports trying to keep high activity level, (hiking/hunting/golf/walking)  Reports medication helps with symptoms  Pain across back and into RLE  Pain disrupts sleep  No injections to low back  Reports BLE fatigue with ambulation  Reports "totally exhausted feeling lower body"  Denies any bowel/bladder dysfunction      Pain  Current pain rating: 3  At best pain rating: 3  At worst pain ratin  Location: Low Back   Quality: dull ache    Exercise history: Walking 2+ miles as possible, hunting daily      Diagnostic Tests  MRI studies: abnormal        Objective     Tenderness     Additional Tenderness Details  No tenderness to palpation noted low back region    Active Range of Motion Lumbar   Flexion: WFL  Extension: 5 degrees with pain  Left lateral flexion: WFL  Right lateral flexion: WFL  Left rotation: WF  Right rotation: [facility]    Additional Active Range of Motion Details  Reports "ache/tightness" low back with all movements  Trunk rotation decreased 25% bilaterally    Strength/Myotome Testing     Left Hip   Planes of Motion   Flexion: 4+  Extension: 4+  Abduction: 4+  Adduction: 4+    Right Hip   Planes of Motion   Flexion: 5  Extension: 5  Adduction: 5    Left Knee   Flexion: 5  Extension: 5    Right Knee   Flexion: 5  Extension: 5    Left Ankle/Foot   Dorsiflexion: 5  Plantar flexion: 5    Right Ankle/Foot   Dorsiflexion: 5  Plantar flexion: 5    Additional Strength Details  Fair+/Good seated resisted trunk strength      Tests     Additional Tests Details  Decreased BLE mm flexibility noted    Ambulation     Ambulation: Stairs   Ascend stairs: independent  Pattern: reciprocal  Railings: one rail  Descend stairs: independent  Pattern: reciprocal  Railings: one rail    Observational Gait   Gait: within functional limits     Additional Observational Gait Details  Walking 2 miles daily  Has been hunting and walking 3-4 miles with this      Flowsheet Rows      Most Recent Value   PT/OT G-Codes   Assessment Type  Re-evaluation   G code set  Mobility: Walking & Moving Around   Mobility: Walking and Moving Around Current Status ()  CJ   Mobility: Walking and Moving Around Goal Status ()  CJ        Precautions:  N/A  Specialty Daily Treatment Diary      Manual   11/26/18  10/24/18  10/31/18  11/14/18  11-20-18   BLE/ L-spine  10' 15  15  15  15'         Exercise Diary   11/26/18  10/24/18  10/31/18  11/14/18  11-20-18   nustep             [facility]  L3  12'  L2   10  L3 10  L3  10  L 3 10'   Lat Pulldown  40#  30x           Seated row     20# 1 x 20   Leg Press  40#  30x       30# 1 x 20   Leg Ext             Leg curl             St lateral trunk stretch             PPT  30x :05  20  5" hold  20  5" hold  30  x 30 5" hold   bridges  30x 30  30 3x10  3 x 10   Abd crunch   20   5" 20  5"  3x10  3 x 10   abd diagonal    10  5"   20  20  20   PPU  20x :10  1 x 10  20  5"  20  20   Prone hip ext   20  20  20  20   Add sq  30x :05  30  30  30  Held   t-band hip abd  Blue 30x  30  bl 20  bl  20  Blue x 20                                                                               Modalities  11/26/18  10/24/18  10/31/18  11/14/18  11-20-18   MHP/CP  Declined  declined  prone  10  declined  Declined   E-stim             Ultrasound

## 2018-11-26 NOTE — PROGRESS NOTES
PT RE-EVALUATION     Today's date: 2018  Patient name: Javy Dnih  : 1945  MRN: [id number]  Referring provider: Darlyn Rivera MD  Dx:   Encounter Diagnosis     ICD-10-CM    1  Low back pain, unspecified back pain laterality, unspecified chronicity, with sciatica presence unspecified M54 5                   Assessment  Assessment details: Pt presents with chronic LBP with symptoms to BLE with right more symptomatic than left  He reports feeling improved with PT tx  He reports no symptoms to BLE at this time  Symptoms primarily low back  Reports increased symptoms after prolonged sitting or after sleep  He reports no difficulty with prolonged ambulation on uneven surfaces  No sleep disruptions noted  Pt would benefit from continued PT tx to decrease symptoms and improve functional level  Impairments: abnormal or restricted ROM, impaired physical strength, lacks appropriate home exercise program and pain with function     Prognosis: good    Goals  ST  Decrease pain 50% 4 wk  2  Increase trunk ROM by 10-15 degree  4 wk  3  Increase trunk strength to F+/G  4 wk  4  Increase BLE strength to 5/5 4 wk  LT  Pt will report 75% or greater decrease in pain 8 wk  2  Increase trunk ROM to WNL 8 wk  3  Increase trunk strength to WNL 8 wk  4  Pt will report no limitations with ADL's 8 wk  5    Pt will report no limitations with ambulation 8 wk    Plan  Patient would benefit from: PT eval  Planned modality interventions: cryotherapy, thermotherapy: paraffin bath, ultrasound and unattended electrical stimulation  Planned therapy interventions: abdominal trunk stabilization, joint mobilization, manual therapy, strengthening, stretching, therapeutic activities, therapeutic exercise, flexibility, functional ROM exercises and home exercise program  Frequency: 2x week  Duration in weeks: 8  Treatment plan discussed with: patient        Subjective Evaluation    History of Present Illness  Mechanism of injury: Pt presents history chronic LBP  MRI revealed stenosis and degenerative changes  Reports radiating pain at times to groin region and BLE  Right LE more symptomatic than left  Reports recent 2 week period of severe symptoms but this has subsided  Reports trying to keep high activity level, (hiking/hunting/golf/walking)  Reports medication helps with symptoms  Pain across back and into RLE  Pain disrupts sleep  No injections to low back  Reports BLE fatigue with ambulation  Reports "totally exhausted feeling lower body"  Denies any bowel/bladder dysfunction      Pain  Current pain rating: 3  At best pain rating: 3  At worst pain ratin  Location: Low Back   Quality: dull ache    Exercise history: Walking 2+ miles as possible, hunting daily      Diagnostic Tests  MRI studies: abnormal        Objective     Tenderness     Additional Tenderness Details  No tenderness to palpation noted low back region    Active Range of Motion     Lumbar   Flexion: WFL  Extension: 5 degrees with pain  Left lateral flexion: WFL  Right lateral flexion: WFL  Left rotation: WF  Right rotation: [facility]    Additional Active Range of Motion Details  Reports "ache/tightness" low back with all movements  Trunk rotation decreased 25% bilaterally    Strength/Myotome Testing     Left Hip   Planes of Motion   Flexion: 4+  Extension: 4+  Abduction: 4+  Adduction: 4+    Right Hip   Planes of Motion   Flexion: 5  Extension: 5  Adduction: 5    Left Knee   Flexion: 5  Extension: 5    Right Knee   Flexion: 5  Extension: 5    Left Ankle/Foot   Dorsiflexion: 5  Plantar flexion: 5    Right Ankle/Foot   Dorsiflexion: 5  Plantar flexion: 5    Additional Strength Details  Fair+/Good seated resisted trunk strength      Tests     Additional Tests Details  Decreased BLE mm flexibility noted    Ambulation     Ambulation: Stairs   Ascend stairs: independent  Pattern: reciprocal  Railings: one rail  Descend stairs: independent  Pattern: reciprocal  Railings: one rail    Observational Gait   Gait: within functional limits     Additional Observational Gait Details  Walking 2 miles daily  Has been hunting and walking 3-4 miles with this      Flowsheet Rows      Most Recent Value   PT/OT G-Codes   Assessment Type  Re-evaluation   G code set  Mobility: Walking & Moving Around   Mobility: Walking and Moving Around Current Status ()  CJ   Mobility: Walking and Moving Around Goal Status ()  CJ        Precautions:  N/A  Specialty Daily Treatment Diary      Manual   11/26/18  10/24/18  10/31/18  11/14/18  11-20-18   BLE/ L-spine  10' 15  15  15  15'         Exercise Diary   11/26/18  10/24/18  10/31/18  11/14/18  11-20-18   nustep             [address] [facility]  L3  12'  L2   10  L3 10  L3  10  L 3 10'   Lat Pulldown  40#  30x           Seated row     20# 1 x 20   Leg Press  40#  30x       30# 1 x 20   Leg Ext             Leg curl             St lateral trunk stretch             PPT  30x :05  20  5" hold  20  5" hold  30  x 30 5" hold   bridges  30x 30  30 3x10  3 x 10   Abd crunch   20   5" 20  5"  3x10  3 x 10   abd diagonal    10  5"   20  20  20   PPU  20x :10  1 x 10  20  5"  20  20   Prone hip ext   20  20  20  20   Add sq  30x :05  30  30  30  Held   t-band hip abd  Blue 30x  30  bl 20  bl  20  Blue x 20                                                                               Modalities  11/26/18  10/24/18  10/31/18  11/14/18  11-20-18   MHP/CP  Declined  declined  prone  10  declined  Declined   E-stim             Ultrasound

## 2018-11-27 ENCOUNTER — APPOINTMENT (OUTPATIENT)
Dept: PHYSICAL THERAPY | Facility: CLINIC | Age: 73
End: 2018-11-27
Payer: COMMERCIAL

## 2018-11-29 ENCOUNTER — APPOINTMENT (OUTPATIENT)
Dept: PHYSICAL THERAPY | Facility: CLINIC | Age: 73
End: 2018-11-29
Payer: COMMERCIAL

## 2018-11-30 ENCOUNTER — OFFICE VISIT (OUTPATIENT)
Dept: PHYSICAL THERAPY | Facility: CLINIC | Age: 73
End: 2018-11-30
Payer: COMMERCIAL

## 2018-11-30 DIAGNOSIS — M54.5 LOW BACK PAIN, UNSPECIFIED BACK PAIN LATERALITY, UNSPECIFIED CHRONICITY, WITH SCIATICA PRESENCE UNSPECIFIED: Primary | ICD-10-CM

## 2018-11-30 PROCEDURE — G8980 MOBILITY D/C STATUS: HCPCS

## 2018-11-30 PROCEDURE — 97140 MANUAL THERAPY 1/> REGIONS: CPT

## 2018-11-30 PROCEDURE — G8979 MOBILITY GOAL STATUS: HCPCS

## 2018-11-30 PROCEDURE — 97110 THERAPEUTIC EXERCISES: CPT

## 2018-11-30 NOTE — PROGRESS NOTES
Daily Note     Today's date: 2018  Patient name: Patricia Jeffries  : 1945  MRN: [id number]  Referring provider: Cecil Wick MD  Dx:   Encounter Diagnosis     ICD-10-CM    1  Low back pain, unspecified back pain laterality, unspecified chronicity, with sciatica presence unspecified M54 5                   Subjective: The patient states that he did some walking yesterday and is having some stiffness  Objective: See treatment diary below      Assessment: Tolerated treatment well  Patient exhibited good technique with therapeutic exercises and would benefit from continued PT  The patient did have tight B LEs  Plan: Continue per plan of care       Precautions:  N/A  Specialty Daily Treatment Diary      Manual   11/26/18 11/30/18  10/31/18  11/14/18  11-20-18   BLE/ L-spine  10' 15'  15  15  15'         Exercise Diary   11/26/18 11/30/18  10/31/18  11/14/18  11-20-18   [facility]             [address] [facility]  L3  12'  L3   15'  L3 10  L3  10  L 3 10'   Lat Pulldown  40#  30x  55# 30x         Seated row         20# 1 x 20   Leg Press  40#  30x  50# 30x     30# 1 x 20   Leg Ext    25# 30x         Leg curl    30# 30x         St lateral trunk stretch             PPT  30x :05  20  5" hold  20  5" hold  30  x 30 5" hold   bridges  30x   30 3x10  3 x 10   Abd crunch    20  5"  3x10  3 x 10   abd diagonal     20  20  20   PPU  20x :10  1 x 10  20  5"  20  20   Prone hip ext    20  20  20   Add sq  30x :05  30  30  30  Held   t-band hip abd  Blue 30x  30  bl 20  bl  20  Blue x 20                                                                               Modalities  11/26/18 11/30/18  10/31/18  11/14/18  11-20-18   MHP/CP  Declined  declined  prone  10  declined  Declined   E-stim             Ultrasound

## 2019-01-02 NOTE — PROGRESS NOTES
PT DISCHARGE      Today's date: 2019  Patient name: Lokesh Manriquez  : 1945  MRN: [id number]  Referring provider: Chinyere Carlos MD  Dx:   Encounter Diagnosis     ICD-10-CM    1  Low back pain, unspecified back pain laterality, unspecified chronicity, with sciatica presence unspecified M54 5        Start Time: 0800  Stop Time: 0900  Total time in clinic (min): 60 minutes    Assessment  Assessment details: Pt presented with chronic LBP with symptoms to BLE with right more symptomatic than left  He reported feeling improved with PT tx  He reported no symptoms to BLE at this time  Symptoms primarily low back  Reported increased symptoms after prolonged sitting or after sleep  He reported no difficulty with prolonged ambulation on uneven surfaces  No sleep disruptions noted  Pt last attended session was on 18  Findings per last re-evaluation  D/C PT services  Impairments: abnormal or restricted ROM, impaired physical strength, lacks appropriate home exercise program and pain with function     Prognosis: good    Goals  ST  Decrease pain 50% 4 wk  2  Increase trunk ROM by 10-15 degree  4 wk  3  Increase trunk strength to F+/G  4 wk  4  Increase BLE strength to 5/5 4 wk  LT  Pt will report 75% or greater decrease in pain 8 wk  2  Increase trunk ROM to WNL 8 wk  3  Increase trunk strength to WNL 8 wk  4  Pt will report no limitations with ADL's 8 wk  5    Pt will report no limitations with ambulation 8 wk    Plan  Plan details: D/C PT services  Findings per last re-evaluation   Patient would benefit from: PT eval  Planned modality interventions: cryotherapy, thermotherapy: paraffin bath, ultrasound and unattended electrical stimulation  Planned therapy interventions: abdominal trunk stabilization, joint mobilization, manual therapy, strengthening, stretching, therapeutic activities, therapeutic exercise, flexibility, functional ROM exercises and home exercise program        Subjective Evaluation    History of Present Illness  Mechanism of injury: Pt presents history chronic LBP  MRI revealed stenosis and degenerative changes  Reports radiating pain at times to groin region and BLE  Right LE more symptomatic than left  Reports recent 2 week period of severe symptoms but this has subsided  Reports trying to keep high activity level, (hiking/hunting/golf/walking)  Reports medication helps with symptoms  Pain across back and into RLE  Pain disrupts sleep  No injections to low back  Reports BLE fatigue with ambulation  Reports "totally exhausted feeling lower body"  Denies any bowel/bladder dysfunction      Pain  Current pain rating: 3  At best pain rating: 3  At worst pain ratin  Location: Low Back   Quality: dull ache    Exercise history: Walking 2+ miles as possible, hunting daily      Diagnostic Tests  MRI studies: abnormal        Objective     Tenderness     Additional Tenderness Details  No tenderness to palpation noted low back region    Active Range of Motion     Lumbar   Flexion: WFL  Extension: 5 degrees with pain  Left lateral flexion: WFL  Right lateral flexion: WFL  Left rotation: [facility]  Right rotation: [facility]    Additional Active Range of Motion Details  Reports "ache/tightness" low back with all movements  Trunk rotation decreased 25% bilaterally    Strength/Myotome Testing     Left Hip   Planes of Motion   Flexion: 4+  Extension: 4+  Abduction: 4+  Adduction: 4+    Right Hip   Planes of Motion   Flexion: 5  Extension: 5  Adduction: 5    Left Knee   Flexion: 5  Extension: 5    Right Knee   Flexion: 5  Extension: 5    Left Ankle/Foot   Dorsiflexion: 5  Plantar flexion: 5    Right Ankle/Foot   Dorsiflexion: 5  Plantar flexion: 5    Additional Strength Details  Fair+/Good seated resisted trunk strength      Tests     Additional Tests Details  Decreased BLE mm flexibility noted    Ambulation     Ambulation: Stairs   Ascend stairs: independent  Pattern: reciprocal  Railings: one rail  Descend stairs: independent  Pattern: reciprocal  Railings: one rail    Observational Gait   Gait: within functional limits     Additional Observational Gait Details  Walking 2 miles daily  Has been hunting and walking 3-4 miles with this      Flowsheet Rows      Most Recent Value   PT/OT G-Codes   Current Score  72   Projected Score  63   Assessment Type  Discharge   G code set  Mobility: Walking & Moving Around   Mobility: Walking and Moving Around Goal Status ()  CJ   Mobility: Walking and Moving Around Discharge Status ()  Anjelica Rangel

## 2019-01-26 ENCOUNTER — OFFICE VISIT (OUTPATIENT)
Dept: INTERNAL MEDICINE CLINIC | Facility: CLINIC | Age: 74
End: 2019-01-26
Payer: COMMERCIAL

## 2019-01-26 VITALS
BODY MASS INDEX: 31.13 KG/M2 | HEART RATE: 63 BPM | WEIGHT: 205.4 LBS | DIASTOLIC BLOOD PRESSURE: 90 MMHG | HEIGHT: 68 IN | OXYGEN SATURATION: 99 % | SYSTOLIC BLOOD PRESSURE: 136 MMHG

## 2019-01-26 DIAGNOSIS — M48.02 CERVICAL SPINAL STENOSIS: ICD-10-CM

## 2019-01-26 DIAGNOSIS — I10 ESSENTIAL HYPERTENSION: Primary | ICD-10-CM

## 2019-01-26 PROBLEM — M25.512 CHRONIC PAIN OF BOTH SHOULDERS: Status: ACTIVE | Noted: 2019-01-26

## 2019-01-26 PROBLEM — G89.29 CHRONIC PAIN OF BOTH SHOULDERS: Status: ACTIVE | Noted: 2019-01-26

## 2019-01-26 PROBLEM — M25.511 CHRONIC PAIN OF BOTH SHOULDERS: Status: ACTIVE | Noted: 2019-01-26

## 2019-01-26 PROCEDURE — 99213 OFFICE O/P EST LOW 20 MIN: CPT | Performed by: INTERNAL MEDICINE

## 2019-01-26 PROCEDURE — 1036F TOBACCO NON-USER: CPT | Performed by: INTERNAL MEDICINE

## 2019-01-26 PROCEDURE — 1160F RVW MEDS BY RX/DR IN RCRD: CPT | Performed by: INTERNAL MEDICINE

## 2019-01-26 NOTE — PATIENT INSTRUCTIONS
A patient who is stable  White coat hypertension which is treated is noted  He should get the echo  Follow-up in [place]

## 2019-01-26 NOTE — PROGRESS NOTES
Assessment/Plan:       Diagnoses and all orders for this visit:    Essential hypertension    Cervical spinal stenosis          Patient Instructions   A patient who is stable  White coat hypertension which is treated is noted  He should get the echo  Follow-up in June  Subjective:      Patient ID: Con Arrington is a 75 y o  male  A 14-year-old man who feels well except for the fact that he continues to complain of lumbar pain with bilateral neurogenic claudication and weakness, along with sx that suggest cervical disease too  He reports pain felt in both shoulders and upper arms radiating down the anterior aspect of both arms towards the elbows  Blood pressure:  Checks it at home in his normal   Here it is a bit higher  I had given him a dose of metoprolol in number of years ago but he got excessively bradycardic so he does not want to take medication if he can help  Echocardiogram was done in 2014 and 2017 were normal    Hyperlipidemia is treated    Prostate health followed by urologist he does have BPH    Asbestos exposure but no symptoms  The following portions of the patient's history were reviewed and updated as appropriate:   He has a past medical history of Abnormal EKG; Asbestosis ([address] ); History of sinus bradycardia; Hyperlipidemia; Prostate disorder; Shingles; and Spinal stenosis  ,   does not have any pertinent problems on file  ,   has a past surgical history that includes Appendectomy; Tonsillectomy; and Neuroplasty / transposition median nerve at carpal tunnel ,  family history includes Colon cancer in his father; Diabetes in his father; Heart disease in his father; Hypertension in his father; Stroke in his father; Transient ischemic attack in his father  ,   reports that he has quit smoking  He has never used smokeless tobacco  He reports that he drinks alcohol  He reports that he does not use drugs  ,  is allergic to corticosteroids and lisinopril     Current Outpatient Prescriptions   Medication Sig Dispense Refill    aspirin (ECOTRIN LOW STRENGTH) 81 mg EC tablet Take 1 tablet by mouth daily      atorvastatin (LIPITOR) 10 mg tablet Take 1 tablet (10 mg total) by mouth daily 90 tablet 3    fluticasone (FLONASE ALLERGY RELIEF) 50 mcg/act nasal spray 2 sprays into each nostril daily      celecoxib (CeleBREX) 200 mg capsule Take 1 capsule (200 mg total) by mouth 2 (two) times a day (Patient not taking: Reported on 1/26/2019 ) 60 capsule 1    metoprolol succinate (TOPROL-XL) 25 mg 24 hr tablet 1/2 tablet daily (Patient not taking: Reported on 1/26/2019 ) 30 tablet 0    omeprazole (PriLOSEC) 20 mg delayed release capsule Take 1 capsule (20 mg total) by mouth daily for 90 days 90 capsule 3    oxyCODONE (ROXICODONE) 5 mg immediate release tablet Take 1 tablet (5 mg total) by mouth every 4 (four) hours as needed for moderate pain Max Daily Amount: 30 mg (Patient not taking: Reported on 1/26/2019 ) 180 tablet 0     No current facility-administered medications for this visit  Review of Systems   Constitutional: Negative for chills and fever  HENT: Negative for sore throat and trouble swallowing  Eyes: Negative for pain  Respiratory: Negative for cough and shortness of breath  Cardiovascular: Negative for chest pain and palpitations  Gastrointestinal: Negative for abdominal pain, blood in stool, diarrhea, nausea and vomiting  Endocrine: Negative for cold intolerance and heat intolerance  Genitourinary: Negative for dysuria, frequency and testicular pain  Musculoskeletal: Positive for arthralgias and back pain  Negative for joint swelling  Allergic/Immunologic: Negative for immunocompromised state  Neurological: Negative for dizziness, syncope and headaches  Hematological: Negative for adenopathy  Does not bruise/bleed easily  Psychiatric/Behavioral: Negative for dysphoric mood  The patient is not nervous/anxious            Objective:  Vitals: 01/26/19 0814   BP: 136/90   Pulse: 63   SpO2: 99%      Physical Exam   Constitutional: He is oriented to person, place, and time  He appears well-developed and well-nourished  HENT:   Head: Normocephalic and atraumatic  Eyes: Pupils are equal, round, and reactive to light  Neck: Normal range of motion  Neck supple  No tracheal deviation present  No thyromegaly present  Cardiovascular: Normal rate and regular rhythm  Exam reveals no gallop  Murmur heard  Decrescendo systolic murmur is present with a grade of 1/6   Grade 1/6 early decrescendo murmur heard best right sternal border 2nd intercostal space without radiation   Pulmonary/Chest: Effort normal  No respiratory distress  He has no wheezes  He has no rales  Abdominal: Soft  Bowel sounds are normal  There is no tenderness  Musculoskeletal: Normal range of motion  He exhibits no tenderness or deformity  Neurological: He is alert and oriented to person, place, and time  He has normal reflexes  Coordination normal    Skin: Skin is warm and dry  Psychiatric: He has a normal mood and affect

## 2019-01-30 DIAGNOSIS — J30.89 NON-SEASONAL ALLERGIC RHINITIS, UNSPECIFIED TRIGGER: ICD-10-CM

## 2019-01-30 RX ORDER — FLUTICASONE PROPIONATE 50 MCG
SPRAY, SUSPENSION (ML) NASAL
Refills: 0 | OUTPATIENT
Start: 2019-01-30

## 2019-02-02 DIAGNOSIS — J30.89 NON-SEASONAL ALLERGIC RHINITIS, UNSPECIFIED TRIGGER: ICD-10-CM

## 2019-02-04 RX ORDER — FLUTICASONE PROPIONATE 50 MCG
SPRAY, SUSPENSION (ML) NASAL
Qty: 1 BOTTLE | Refills: 5 | Status: SHIPPED | OUTPATIENT
Start: 2019-02-04 | End: 2019-12-26 | Stop reason: SDUPTHER

## 2019-05-17 ENCOUNTER — OFFICE VISIT (OUTPATIENT)
Dept: GASTROENTEROLOGY | Facility: CLINIC | Age: 74
End: 2019-05-17
Payer: COMMERCIAL

## 2019-05-17 VITALS
WEIGHT: 196.2 LBS | BODY MASS INDEX: 30.79 KG/M2 | HEIGHT: 67 IN | DIASTOLIC BLOOD PRESSURE: 80 MMHG | SYSTOLIC BLOOD PRESSURE: 144 MMHG

## 2019-05-17 DIAGNOSIS — Z86.010 HISTORY OF COLON POLYPS: Primary | ICD-10-CM

## 2019-05-17 PROCEDURE — 99203 OFFICE O/P NEW LOW 30 MIN: CPT | Performed by: INTERNAL MEDICINE

## 2019-05-17 RX ORDER — HYDROCODONE BITARTRATE AND ACETAMINOPHEN 5; 325 MG/1; MG/1
TABLET ORAL
COMMUNITY
End: 2020-05-18 | Stop reason: SDUPTHER

## 2019-06-04 ENCOUNTER — TELEPHONE (OUTPATIENT)
Dept: GASTROENTEROLOGY | Facility: CLINIC | Age: 74
End: 2019-06-04

## 2019-06-17 ENCOUNTER — TELEPHONE (OUTPATIENT)
Dept: GASTROENTEROLOGY | Facility: CLINIC | Age: 74
End: 2019-06-17

## 2019-06-18 PROBLEM — D12.6 TUBULAR ADENOMA OF COLON: Status: ACTIVE | Noted: 2019-06-10

## 2019-06-25 ENCOUNTER — OFFICE VISIT (OUTPATIENT)
Dept: SURGERY | Facility: CLINIC | Age: 74
End: 2019-06-25
Payer: COMMERCIAL

## 2019-06-25 VITALS
HEART RATE: 54 BPM | TEMPERATURE: 98.6 F | HEIGHT: 67 IN | RESPIRATION RATE: 12 BRPM | BODY MASS INDEX: 30.13 KG/M2 | WEIGHT: 192 LBS | SYSTOLIC BLOOD PRESSURE: 118 MMHG | DIASTOLIC BLOOD PRESSURE: 70 MMHG

## 2019-06-25 DIAGNOSIS — L72.3 SEBACEOUS CYST: Primary | ICD-10-CM

## 2019-06-25 PROCEDURE — 99213 OFFICE O/P EST LOW 20 MIN: CPT | Performed by: SURGERY

## 2019-06-25 RX ORDER — HYDROCODONE BITARTRATE AND ACETAMINOPHEN 5; 325 MG/1; MG/1
TABLET ORAL
COMMUNITY
End: 2019-06-25

## 2019-06-25 RX ORDER — GABAPENTIN 300 MG/1
CAPSULE ORAL DAILY
COMMUNITY
End: 2020-05-18

## 2019-08-08 ENCOUNTER — EVALUATION (OUTPATIENT)
Dept: PHYSICAL THERAPY | Facility: CLINIC | Age: 74
End: 2019-08-08
Payer: COMMERCIAL

## 2019-08-08 ENCOUNTER — TELEPHONE (OUTPATIENT)
Dept: PHYSICAL THERAPY | Facility: CLINIC | Age: 74
End: 2019-08-08

## 2019-08-08 DIAGNOSIS — M25.561 ACUTE PAIN OF RIGHT KNEE: Primary | ICD-10-CM

## 2019-08-08 PROCEDURE — 97162 PT EVAL MOD COMPLEX 30 MIN: CPT

## 2019-08-08 NOTE — TELEPHONE ENCOUNTER
PT contacted MD office for protocol or MD instruction as to progression of patient  Spoke with MD nurse  She reports she will speak with MD and either send protocol or contact PT with specific instruction

## 2019-08-08 NOTE — PROGRESS NOTES
PT Evaluation     Today's date: 2019  Patient name: Jane Villafana  : 1945  MRN: [id number]  Referring provider: Freddy Bah MD  Dx:   Encounter Diagnosis     ICD-10-CM    1  Acute pain of right knee M25 561                   Assessment  Assessment details: Pt presents s/p right quad tendon repair on 7-10-19  He is in hinged long leg brace 0-30 degrees  He is ambulating no assistive device  He reports intermittent pain right knee  Minimal swelling noted  Reports he has returned to golf playing 6 holes at a time  PT notes limited ROM due to MD restrictions  Strength not formally tested but pt able to perform SLR independently  Pt will benefit from PT tx to restore normal functional level  Impairments: abnormal gait, abnormal or restricted ROM, activity intolerance, impaired balance, impaired physical strength and lacks appropriate home exercise program    Goals  ST  Decrease pain 50% 6 wk  2  Increase Knee ROM to 0 to 90 degrees 6 wk  3   Increase knee strength to 3+/5 6 wk  LT  Pt will report no pain 12 wk  2  Increase Knee ROM to WNL 12 wk  3  Increase knee strength to WNL 12 wk  4  Pt will report no limitations with ambulation 12 wk  5  Pt will report no limitations with ADL's 12 wk    Plan  Patient would benefit from: PT eval  Planned modality interventions: cryotherapy, thermotherapy: hydrocollator packs and unattended electrical stimulation  Planned therapy interventions: manual therapy, balance, strengthening, stretching, therapeutic activities, therapeutic exercise, functional ROM exercises, flexibility and home exercise program  Frequency: 2x week  Duration in weeks: 12  Treatment plan discussed with: patient        Subjective Evaluation    History of Present Illness  Date of onset: 2019  Date of surgery: 7/10/2019  Mechanism of injury: Pt reports on 19 his dog ran into the back of his right knee resulting in quad tendon rupture    Had surgical repair on 7-10-19  Pt was in long leg brace locked in full extension until 19  Pt brace opened to 30 degree flexion on 19  Reports he is now ambulating no assistive device in brace  Has been golfing playing 6 holes at a time  Pain  Current pain ratin  At best pain ratin  At worst pain ratin  Location: Right Knee   Quality: dull ache and sharp    Exercise history: Hunting, Golfing,          Objective     Observations     Right Knee   Positive for effusion and incision  Tenderness     Right Knee   Tenderness in the quadriceps tendon and superior patella  Active Range of Motion     Right Knee   Flexion: 30 degrees   Extensor lag: 10 degrees     Mobility   Patellar Mobility:     Right Knee   Hypomobile: medial, lateral, superior and inferior     Strength/Myotome Testing     Right Knee   Quadriceps contraction: fair    Additional Strength Details  MMT not performed  Independent SLR     Swelling     Right Knee Girth Measurement (cm)   Joint line: 30 cm    Ambulation     Ambulation: Stairs   Ascend stairs: independent  Pattern: non-reciprocal  Railings: two rails  Descend stairs: independent  Pattern: non-reciprocal  Railings: two rails    Observational Gait   Gait: antalgic   Decreased walking speed, stride length, right stance time, right swing time and right step length       Additional Observational Gait Details  Ambulating no assistive device                Precautions:  S/P Right Quad Tendon Repair 7-10-19       Manual                                                     Exercise Diary                                                                                                                                                                             Modalities

## 2019-08-08 NOTE — LETTER
2019    Tracy Rock MD  [address] [place] [address]    Patient: Jordyn Winter   YOB: 1945   Date of Visit: 2019     Encounter Diagnosis     ICD-10-CM    1  Acute pain of right knee M25 561        Dear Dr Omar Slaughter:    Thank you for your recent referral of Huey Stanton  Please review the attached evaluation summary from Cole's recent visit  Please verify that you agree with the plan of care by signing the attached order  If you have any questions or concerns, please do not hesitate to call  I sincerely appreciate the opportunity to share in the care of one of your patients and hope to have another opportunity to work with you in the near future  Sincerely,    Sofie Garcia, PT      Referring Provider:      I certify that I have read the below Plan of Care and certify the need for these services furnished under this plan of treatment while under my care  Tracy Rock MD  [address] [place] [place] [address]: [phone]          PT Evaluation     Today's date: 2019  Patient name: Huey Stanton  : 1945  MRN: [id number]  Referring provider: Sheldon Melendez MD  Dx:   Encounter Diagnosis     ICD-10-CM    1  Acute pain of right knee M25 561                   Assessment  Assessment details: Pt presents s/p right quad tendon repair on 7-10-19  He is in hinged long leg brace 0-30 degrees  He is ambulating no assistive device  He reports intermittent pain right knee  Minimal swelling noted  Reports he has returned to golf playing 6 holes at a time  PT notes limited ROM due to MD restrictions  Strength not formally tested but pt able to perform SLR independently  Pt will benefit from PT tx to restore normal functional level      Impairments: abnormal gait, abnormal or restricted ROM, activity intolerance, impaired balance, impaired physical strength and lacks appropriate home exercise program    Goals  ST  Decrease pain 50% 6 wk  2  Increase Knee ROM to 0 to 90 degrees 6 wk  3   Increase knee strength to 3+/5 6 wk  LT  Pt will report no pain 12 wk  2  Increase Knee ROM to WNL 12 wk  3  Increase knee strength to WNL 12 wk  4  Pt will report no limitations with ambulation 12 wk  5  Pt will report no limitations with ADL's 12 wk    Plan  Patient would benefit from: PT eval  Planned modality interventions: cryotherapy, thermotherapy: hydrocollator packs and unattended electrical stimulation  Planned therapy interventions: manual therapy, balance, strengthening, stretching, therapeutic activities, therapeutic exercise, functional ROM exercises, flexibility and home exercise program  Frequency: 2x week  Duration in weeks: 12  Treatment plan discussed with: patient        Subjective Evaluation    History of Present Illness  Date of onset: 2019  Date of surgery: 7/10/2019  Mechanism of injury: Pt reports on 19 his dog ran into the back of his right knee resulting in quad tendon rupture  Had surgical repair on 7-10-19  Pt was in long leg brace locked in full extension until 19  Pt brace opened to 30 degree flexion on 19  Reports he is now ambulating no assistive device in brace  Has been golfing playing 6 holes at a time  Pain  Current pain ratin  At best pain ratin  At worst pain ratin  Location: Right Knee   Quality: dull ache and sharp    Exercise history: Hunting, Golfing,          Objective     Observations     Right Knee   Positive for effusion and incision  Tenderness     Right Knee   Tenderness in the quadriceps tendon and superior patella       Active Range of Motion     Right Knee   Flexion: 30 degrees   Extensor lag: 10 degrees     Mobility   Patellar Mobility:     Right Knee   Hypomobile: medial, lateral, superior and inferior     Strength/Myotome Testing     Right Knee   Quadriceps contraction: fair    Additional Strength Details  MMT not performed  Independent SLR     Swelling     Right Knee Girth Measurement (cm)   Joint line: 30 cm    Ambulation     Ambulation: Stairs   Ascend stairs: independent  Pattern: non-reciprocal  Railings: two rails  Descend stairs: independent  Pattern: non-reciprocal  Railings: two rails    Observational Gait   Gait: antalgic   Decreased walking speed, stride length, right stance time, right swing time and right step length       Additional Observational Gait Details  Ambulating no assistive device                Precautions:  S/P Right Quad Tendon Repair 7-10-19       Manual                                                     Exercise Diary                                                                                                                                                                             Modalities

## 2019-08-14 ENCOUNTER — OFFICE VISIT (OUTPATIENT)
Dept: PHYSICAL THERAPY | Facility: CLINIC | Age: 74
End: 2019-08-14
Payer: COMMERCIAL

## 2019-08-14 DIAGNOSIS — M25.561 ACUTE PAIN OF RIGHT KNEE: Primary | ICD-10-CM

## 2019-08-14 PROCEDURE — 97110 THERAPEUTIC EXERCISES: CPT

## 2019-08-14 NOTE — PROGRESS NOTES
Daily Note     Today's date: 2019  Patient name: Rashid Red  : 1945  MRN: [id number]  Referring provider: Bryce Kolb MD  Dx:   Encounter Diagnosis     ICD-10-CM    1  Acute pain of right knee M25 561                   Subjective: The knee feels good      Objective: See treatment diary below      Assessment: Tolerated treatment well  ROM per MD protocol  Pt able to achieve 75 degree flexion with heel slides  Reports tightness at end range  Reports he is going golfing today following PT  Has returned to driving  Patient would benefit from continued PT      Plan: Continue per plan of care            Precautions:  S/P Right Quad Tendon Repair 7-10-19       Manual  19                                                   Exercise Diary  19       Quad set 30x  5"       SLR 30x  Flex/abd       Heel slides 30x       Bridge with LE's on roll 30x       Calf raises in brace 30x       Stand hip flex/abd/ext in brace 30x       Self HS stretch 5x 15"       Self Gastroc stretch 5x  15"                                                                                                           Modalities 19       CP Declined

## 2019-08-19 ENCOUNTER — OFFICE VISIT (OUTPATIENT)
Dept: PHYSICAL THERAPY | Facility: CLINIC | Age: 74
End: 2019-08-19
Payer: COMMERCIAL

## 2019-08-19 DIAGNOSIS — M25.561 ACUTE PAIN OF RIGHT KNEE: Primary | ICD-10-CM

## 2019-08-19 PROCEDURE — 97110 THERAPEUTIC EXERCISES: CPT

## 2019-08-19 PROCEDURE — 97140 MANUAL THERAPY 1/> REGIONS: CPT

## 2019-08-19 NOTE — PROGRESS NOTES
Daily Note     Today's date: 2019  Patient name: Le Soares  : 1945  MRN: [id number]  Referring provider: Marsha Wang MD  Dx:   Encounter Diagnosis     ICD-10-CM    1  Acute pain of right knee M25 561                   Subjective:  I golfed 9 holes this morning  I've been walking around in the house without the brace  Objective: See treatment diary below      Assessment: Tolerated treatment well  Heel slide to 85 degree flexion  Reports ambulating no brace in home and has returned to driving  Continues to lack full extension  To see MD on 19  Patient would benefit from continued PT      Plan: Continue per plan of care        Precautions:  S/P Right Quad Tendon Repair 7-10-19       Manual  19      RLE  10'                                            Exercise Diary  19      Quad set 30x  5" 30x  5"      SLR 30x  Flex/abd 30x  Flex/abd      Heel slides 30x 30x      Bridge with LE's on roll 30x       Calf raises in brace 30x 30x      Stand hip flex/abd/ext in brace 30x 30x      Self HS stretch 5x 15" 5x  20"      Self Gastroc stretch 5x  15" 5x 20"                                                                                                          Modalities 19      CP Declined  Declined

## 2019-08-22 ENCOUNTER — OFFICE VISIT (OUTPATIENT)
Dept: PHYSICAL THERAPY | Facility: CLINIC | Age: 74
End: 2019-08-22
Payer: COMMERCIAL

## 2019-08-22 DIAGNOSIS — M25.561 ACUTE PAIN OF RIGHT KNEE: Primary | ICD-10-CM

## 2019-08-22 PROCEDURE — 97110 THERAPEUTIC EXERCISES: CPT

## 2019-08-22 PROCEDURE — 97140 MANUAL THERAPY 1/> REGIONS: CPT

## 2019-08-22 NOTE — PROGRESS NOTES
Daily Note     Today's date: 2019  Patient name: Harpal Brooks  : 1945  MRN: [id number]  Referring provider: Malgorzata Cardenas MD  Dx:   Encounter Diagnosis     ICD-10-CM    1  Acute pain of right knee M25 561                   Subjective: The knee is good      Objective: See treatment diary below      Assessment: Tolerated treatment well  Saw MD and brace opened to 90 degrees  Reports golfing today and pulling cart without knee pain  Tolerated nustep well  Patient would benefit from continued PT      Plan: Continue per plan of care        Precautions:  S/P Right Quad Tendon Repair 7-10-19       Manual  19     RLE  10'   10'                                         Exercise Diary  19     Quad set 30x  5" 30x  5" 30x  5"     SLR 30x  Flex/abd 30x  Flex/abd 30x  Flex/abd     Heel slides 30x 30x 40x     Bridge with LE's on roll 30x       Calf raises in brace 30x 30x Toe/Heel raises 30x     Stand hip flex/abd/ext in brace 30x 30x 30x     Self HS stretch 5x 15" 5x  20"      Self Gastroc stretch 5x  15" 5x 20"      nustep   L3  12'                                                                                                 Modalities 19     CP Declined  Declined  Declined

## 2019-08-26 ENCOUNTER — OFFICE VISIT (OUTPATIENT)
Dept: PHYSICAL THERAPY | Facility: CLINIC | Age: 74
End: 2019-08-26
Payer: COMMERCIAL

## 2019-08-26 DIAGNOSIS — M25.561 ACUTE PAIN OF RIGHT KNEE: Primary | ICD-10-CM

## 2019-08-26 PROCEDURE — 97110 THERAPEUTIC EXERCISES: CPT

## 2019-08-26 PROCEDURE — 97140 MANUAL THERAPY 1/> REGIONS: CPT

## 2019-08-26 NOTE — PROGRESS NOTES
Daily Note     Today's date: 2019  Patient name: Gale Maldonado  : 1945  MRN: [id number]  Referring provider: Santana Lang MD  Dx:   Encounter Diagnosis     ICD-10-CM    1  Acute pain of right knee M25 561                   Subjective: The patient states that he is doing ok  Reports he is not wearing his brace anymore  Objective: See treatment diary below      Assessment: Tolerated treatment well  Patient exhibited good technique with therapeutic exercises and would benefit from continued PT  Patient is still limited in knee extension  Educated the patient on importance of wearing his brace  Advised the patient to wear his brace as directed by MD       Plan: Continue per plan of care        Precautions:  S/P Right Quad Tendon Repair 7-10-19       Manual  19    RLE  10'   10 10'                                        Exercise Diary  19    Quad set 30x  5" 30x  5" 30x  5" 30x 5"    SLR 30x  Flex/abd 30x  Flex/abd 30x  Flex/abd 30x Flex/Abd    Heel slides 30x 30x 40x 30x    Bridge with LE's on roll 30x       Calf raises in brace 30x 30x Toe/Heel raises 30x     Stand hip flex/abd/ext in brace 30x 30x 30x     Self HS stretch 5x 15" 5x  20"  20x 5"    Self Gastroc stretch 5x  15" 5x 20"      nustep   L3  12' L3 12'                                                                                                Modalities 19    CP Declined  Declined  Declined  Declined

## 2019-08-29 ENCOUNTER — OFFICE VISIT (OUTPATIENT)
Dept: PHYSICAL THERAPY | Facility: CLINIC | Age: 74
End: 2019-08-29
Payer: COMMERCIAL

## 2019-08-29 DIAGNOSIS — M25.561 ACUTE PAIN OF RIGHT KNEE: Primary | ICD-10-CM

## 2019-08-29 PROCEDURE — 97140 MANUAL THERAPY 1/> REGIONS: CPT

## 2019-08-29 PROCEDURE — 97110 THERAPEUTIC EXERCISES: CPT

## 2019-08-29 NOTE — PROGRESS NOTES
Daily Note     Today's date: 2019  Patient name: Zelalem Enrique  : 1945  MRN: [id number]  Referring provider: Elisabet Davis MD  Dx:   Encounter Diagnosis     ICD-10-CM    1  Acute pain of right knee M25 561                   Subjective:  I saw the doctor  He opened the brace to 120 degrees but I have not been wearing it except when I golf  Objective: See treatment diary below      Assessment: Tolerated treatment well  Modified tx to avoid TE that irritate low back  ROM -9 to 100 degrees actively  Reports no pain  Patient would benefit from continued PT      Plan: Continue per plan of care        Precautions:  S/P Right Quad Tendon Repair 7-10-19       Manual  19   RLE  10'   10' 10 15'                                       Exercise Diary  19   Quad set 30x  5" 30x  5" 30x  5" 30x 5" 40x  5"   SLR 30x  Flex/abd 30x  Flex/abd 30x  Flex/abd 30x Flex/Abd 30x   Heel slides 30x 30x 40x 30x 30x with AAROM   Bridge with LE's on roll 30x       Calf raises in brace 30x 30x Toe/Heel raises 30x  30x    Stand hip flex/abd/ext in brace 30x 30x 30x     Self HS stretch 5x 15" 5x  20"  20x 5"    Self Gastroc stretch 5x  15" 5x 20"      nustep   L3  12' L3 12' L4  15'   SAQ     40x                                                                                       Modalities 19   CP Declined  Declined  Declined  Declined Declined

## 2019-09-03 ENCOUNTER — OFFICE VISIT (OUTPATIENT)
Dept: PHYSICAL THERAPY | Facility: CLINIC | Age: 74
End: 2019-09-03
Payer: COMMERCIAL

## 2019-09-03 DIAGNOSIS — M25.561 ACUTE PAIN OF RIGHT KNEE: Primary | ICD-10-CM

## 2019-09-03 PROCEDURE — 97535 SELF CARE MNGMENT TRAINING: CPT

## 2019-09-03 NOTE — PROGRESS NOTES
Daily Note     Today's date: 9/3/2019  Patient name: Nahomy Aguila  : 1945  MRN: [id number]  Referring provider: Amberly Ndiaye MD  Dx:   Encounter Diagnosis     ICD-10-CM    1  Acute pain of right knee M25 561                   Subjective:  I saw the MD today  He got rid of the brace and told me I can do the therapy at home  Objective: See treatment diary below      Assessment: Tolerated treatment well  Issued and reviewed HEP  Pt had no questions regarding this  Discussed continuing PT once weekly to progress HEP per MD protocol and pt tolerances  Pt agreeable with this  Patient would benefit from continued PT      Plan: Continue per plan of care        Precautions:  S/P Right Quad Tendon Repair 7-10-19       Manual  9-3-19 8-19-19 8-22-19 8/26/19 8-29-19   RLE  10'   10' 10' 15'                                       Exercise Diary  9-3-19 8-19-19 8-22-19 8/26/29 8-29-19   Quad set  30x  5" 30x  5" 30x 5" 40x  5"   SLR  30x  Flex/abd 30x  Flex/abd 30x Flex/Abd 30x   Heel slides  30x 40x 30x 30x with AAROM   Bridge with LE's on roll        Calf raises in brace  30x Toe/Heel raises 30x  30x    Stand hip flex/abd/ext in brace  30x 30x     Self HS stretch  5x  20"  20x 5"    Self Gastroc stretch  5x 20"      nustep   L3  12' L3 12' L4  15'   SAQ     40x                                                                           HEP Instructed and issued HEP           Modalities 9-3-19 8-19-19 8-22-19 8/26/19 8-29-19   CP  Declined  Declined  Declined Declined

## 2019-09-05 ENCOUNTER — APPOINTMENT (OUTPATIENT)
Dept: PHYSICAL THERAPY | Facility: CLINIC | Age: 74
End: 2019-09-05
Payer: COMMERCIAL

## 2019-09-27 ENCOUNTER — OFFICE VISIT (OUTPATIENT)
Dept: PHYSICAL THERAPY | Facility: CLINIC | Age: 74
End: 2019-09-27
Payer: COMMERCIAL

## 2019-09-27 DIAGNOSIS — M25.561 ACUTE PAIN OF RIGHT KNEE: Primary | ICD-10-CM

## 2019-09-27 PROCEDURE — 97110 THERAPEUTIC EXERCISES: CPT

## 2019-09-27 PROCEDURE — 97164 PT RE-EVAL EST PLAN CARE: CPT

## 2019-09-27 NOTE — PROGRESS NOTES
PT RE-EVALUATION     Today's date: 2019  Patient name: Chandler Roman  : 1945  MRN: [id number]  Referring provider: Kendall Lang MD  Dx:   Encounter Diagnosis     ICD-10-CM    1  Acute pain of right knee M25 561                   Assessment  Assessment details: Pt presented s/p right quad tendon repair on 7-10-19  He has attended 8 total PT sessions to this point  He has progressed very well  He reports minimal to no pain right knee  No instability noted  He has returned to golf and is performing all ADL's  Gait WNL  PT notes good progression of ROM and strength with minimal deficits remaining  No use of brace reported  Pt is to see MD on 10-1-19  PT will continue per MD recommendations after that time  Pt would benefit from continued PT to restore full ROM/strength and functional level  Impairments: abnormal gait, abnormal or restricted ROM, activity intolerance, impaired balance, impaired physical strength and lacks appropriate home exercise program    Goals  ST  Decrease pain 50% 6 wk  2  Increase Knee ROM to 0 to 90 degrees 6 wk  3   Increase knee strength to 3+/5 6 wk  LT  Pt will report no pain 12 wk  2  Increase Knee ROM to WNL 12 wk  3  Increase knee strength to WNL 12 wk  4  Pt will report no limitations with ambulation 12 wk  5    Pt will report no limitations with ADL's 12 wk    Plan  Patient would benefit from: PT eval  Planned modality interventions: cryotherapy, thermotherapy: hydrocollator packs and unattended electrical stimulation  Planned therapy interventions: manual therapy, balance, strengthening, stretching, therapeutic activities, therapeutic exercise, functional ROM exercises, flexibility and home exercise program  Frequency: 2x week  Duration in weeks: 12  Treatment plan discussed with: patient        Subjective Evaluation    History of Present Illness  Date of onset: 2019  Date of surgery: 7/10/2019  Mechanism of injury: Pt reports on 19 his dog ran into the back of his right knee resulting in quad tendon rupture  Had surgical repair on 7-10-19  Pt was in long leg brace locked in full extension until 19  Pt brace opened to 30 degree flexion on 19  Reports he is now ambulating no assistive device in brace  Has been golfing playing 6 holes at a time  Pain  Current pain ratin  At best pain ratin  At worst pain ratin  Location: Right Knee   Quality: dull ache    Exercise history: Hunting, Golfing,          Objective     Observations     Right Knee   Positive for incision       Additional Observation Details  Minimal swelling noted    Tenderness     Additional Tenderness Details  No tenderness to palpation noted    Neurological Testing     Additional Neurological Details  Diminished sensation to light touch lateral border of incision    Active Range of Motion     Right Knee   Flexion: 114 degrees   Extensor lag: 3 degrees     Mobility   Patellar Mobility:     Right Knee   WFL: medial, lateral, superior and inferior    Strength/Myotome Testing     Left Knee   Flexion: 5  Extension: 5    Right Knee   Flexion: 4+  Extension: 4+  Quadriceps contraction: good    Ambulation     Ambulation: Stairs   Ascend stairs: independent  Pattern: reciprocal  Railings: two rails  Descend stairs: independent  Pattern: reciprocal  Railings: two rails    Observational Gait   Gait: within functional limits                 Precautions:  S/P Right Quad Tendon Repair 7-10-19       Manual  9-3-19 9-27-19 8-22-19 8/26/19 8-29-19   RLE   10 10' 15'                                       Exercise Diary  9-3-19 9-27-19 8-22-19 8/26/29 8-29-19   Quad set   30x  5" 30x 5" 40x  5"   SLR   30x  Flex/abd 30x Flex/Abd 30x   Heel slides   40x 30x 30x with AAROM   Bridge with LE's on roll        Calf raises in brace   Toe/Heel raises 30x  30x    Stand hip flex/abd/ext in brace   30x     Self HS stretch    20x 5"    Self Gastroc stretch        nustep   L3 12' L3 12' L4  15'   SAQ     40x   [facility]  L3  12'      Mini squats  30x      Heel Raises  30x      Step ups  30x  2 riser                                              HEP Instructed and issued HEP           Modalities 9-3-19 9-27-19 8-22-19 8/26/19 8-29-19   CP   Declined  Declined Declined

## 2019-09-27 NOTE — LETTER
2019    Mitchael Dandy, MD  [address] [place] [address]    Patient: Yasmin Choi   YOB: 1945   Date of Visit: 2019     Encounter Diagnosis     ICD-10-CM    1  Acute pain of right knee M25 561        Dear Dr Myrtle Thomson:    Thank you for your recent referral of Yasmin Choi  Please review the attached evaluation summary from Cole's recent visit  Please verify that you agree with the plan of care by signing the attached order  If you have any questions or concerns, please do not hesitate to call  I sincerely appreciate the opportunity to share in the care of one of your patients and hope to have another opportunity to work with you in the near future  Sincerely,    Xavi Conner, PT      Referring Provider:      I certify that I have read the below Plan of Care and certify the need for these services furnished under this plan of treatment while under my care  Mitchael Dandy, MD  [address] [place] [address]  VIA Facsimile: [phone]          PT RE-EVALUATION     Today's date: 2019  Patient name: Yasmin Choi  : 1945  MRN: [id number]  Referring provider: Kim Alejandra MD  Dx:   Encounter Diagnosis     ICD-10-CM    1  Acute pain of right knee M25 561                   Assessment  Assessment details: Pt presented s/p right quad tendon repair on 7-10-19  He has attended 8 total PT sessions to this point  He has progressed very well  He reports minimal to no pain right knee  No instability noted  He has returned to golf and is performing all ADL's  Gait WNL  PT notes good progression of ROM and strength with minimal deficits remaining  No use of brace reported  Pt is to see MD on 10-1-19  PT will continue per MD recommendations after that time  Pt would benefit from continued PT to restore full ROM/strength and functional level      Impairments: abnormal gait, abnormal or restricted ROM, activity intolerance, impaired balance, impaired physical strength and lacks appropriate home exercise program    Goals  ST  Decrease pain 50% 6 wk  2  Increase Knee ROM to 0 to 90 degrees 6 wk  3   Increase knee strength to 3+/5 6 wk  LT  Pt will report no pain 12 wk  2  Increase Knee ROM to WNL 12 wk  3  Increase knee strength to WNL 12 wk  4  Pt will report no limitations with ambulation 12 wk  5  Pt will report no limitations with ADL's 12 wk    Plan  Patient would benefit from: PT eval  Planned modality interventions: cryotherapy, thermotherapy: hydrocollator packs and unattended electrical stimulation  Planned therapy interventions: manual therapy, balance, strengthening, stretching, therapeutic activities, therapeutic exercise, functional ROM exercises, flexibility and home exercise program  Frequency: 2x week  Duration in weeks: 12  Treatment plan discussed with: patient        Subjective Evaluation    History of Present Illness  Date of onset: 2019  Date of surgery: 7/10/2019  Mechanism of injury: Pt reports on 19 his dog ran into the back of his right knee resulting in quad tendon rupture  Had surgical repair on 7-10-19  Pt was in long leg brace locked in full extension until 19  Pt brace opened to 30 degree flexion on 19  Reports he is now ambulating no assistive device in brace  Has been golfing playing 6 holes at a time  Pain  Current pain ratin  At best pain ratin  At worst pain ratin  Location: Right Knee   Quality: dull ache    Exercise history: Hunting, Golfing,          Objective     Observations     Right Knee   Positive for incision       Additional Observation Details  Minimal swelling noted    Tenderness     Additional Tenderness Details  No tenderness to palpation noted    Neurological Testing     Additional Neurological Details  Diminished sensation to light touch lateral border of incision    Active Range of Motion     Right Knee   Flexion: 114 degrees   Extensor lag: 3 degrees     Mobility   Patellar Mobility:     Right Knee   WFL: medial, lateral, superior and inferior    Strength/Myotome Testing     Left Knee   Flexion: 5  Extension: 5    Right Knee   Flexion: 4+  Extension: 4+  Quadriceps contraction: good    Ambulation     Ambulation: Stairs   Ascend stairs: independent  Pattern: reciprocal  Railings: two rails  Descend stairs: independent  Pattern: reciprocal  Railings: two rails    Observational Gait   Gait: within functional limits                 Precautions:  S/P Right Quad Tendon Repair 7-10-19       Manual  9-3-19 9-27-19 8-22-19 8/26/19 8-29-19   RLE   10' 10' 15'                                       Exercise Diary  9-3-19 9-27-19 8-22-19 8/26/29 8-29-19   Quad set   30x  5" 30x 5" 40x  5"   SLR   30x  Flex/abd 30x Flex/Abd 30x   Heel slides   40x 30x 30x with AAROM   Bridge with LE's on roll        Calf raises in brace   Toe/Heel raises 30x  30x    Stand hip flex/abd/ext in brace   30x     Self HS stretch    20x 5"    Self Gastroc stretch        nustep   L3  12' L3 12' L4  15'   SAQ     40x   [address] [facility]  L3  12'      Mini squats  30x      Heel Raises  30x      Step ups  30x  2 riser                                              HEP Instructed and issued HEP           Modalities 9-3-19 9-27-19 8-22-19 8/26/19 8-29-19   CP   Declined  Declined Declined

## 2019-10-01 ENCOUNTER — OFFICE VISIT (OUTPATIENT)
Dept: PHYSICAL THERAPY | Facility: CLINIC | Age: 74
End: 2019-10-01
Payer: COMMERCIAL

## 2019-10-01 DIAGNOSIS — M25.561 ACUTE PAIN OF RIGHT KNEE: Primary | ICD-10-CM

## 2019-10-01 PROCEDURE — 97110 THERAPEUTIC EXERCISES: CPT

## 2019-10-01 NOTE — PROGRESS NOTES
Daily Note     Today's date: 10/1/2019  Patient name: Jose Shepard  : 1945  MRN: [id number]  Referring provider: Stephanie Daniels MD  Dx:   Encounter Diagnosis     ICD-10-CM    1  Acute pain of right knee M25 561                   Subjective: I saw the doctor today  The knee is good  Objective: See treatment diary below      Assessment: Tolerated treatment well  Limited tx per pt request   Did not wish to add new TE  No pain reported  Will continue PT for knee for 1 week with possible d/c after that time  Patient would benefit from continued PT      Plan: Continue per plan of care          Precautions:  S/P Right Quad Tendon Repair 7-10-19       Manual  9-3-19 9-27-19 10-1-19 8/26/19 8-29-19   RLE    10' 15'                                       Exercise Diary  9-3-19 9-27-19 10-1-19 8/26/29 8-29-19   Quad set    30x 5" 40x  5"   SLR    30x Flex/Abd 30x   Heel slides    30x 30x with AAROM   Bridge with LE's on roll        Calf raises in brace     30x    Stand hip flex/abd/ext in brace        Self HS stretch    20x 5"    Self Gastroc stretch        nustep    L3 12' L4  15'   SAQ     40x   [address] [facility]  L3  12' L3  15'     Mini squats  30x 30x     Heel Raises  30x 30x     Step ups  30x  2 riser 30x  2 riser                                             HEP Instructed and issued HEP           Modalities 9-3-19 9-27-19 10-1-19 8/26/19 8-29-19   CP    Declined Declined

## 2019-10-03 ENCOUNTER — APPOINTMENT (OUTPATIENT)
Dept: PHYSICAL THERAPY | Facility: CLINIC | Age: 74
End: 2019-10-03
Payer: COMMERCIAL

## 2019-10-07 ENCOUNTER — OFFICE VISIT (OUTPATIENT)
Dept: PHYSICAL THERAPY | Facility: CLINIC | Age: 74
End: 2019-10-07
Payer: COMMERCIAL

## 2019-10-07 DIAGNOSIS — E78.00 HIGH CHOLESTEROL: ICD-10-CM

## 2019-10-07 DIAGNOSIS — M25.561 ACUTE PAIN OF RIGHT KNEE: Primary | ICD-10-CM

## 2019-10-07 PROCEDURE — 97140 MANUAL THERAPY 1/> REGIONS: CPT

## 2019-10-07 RX ORDER — ATORVASTATIN CALCIUM 10 MG/1
TABLET, FILM COATED ORAL
Qty: 90 TABLET | Refills: 3 | Status: SHIPPED | OUTPATIENT
Start: 2019-10-07 | End: 2020-11-09 | Stop reason: SDUPTHER

## 2019-10-07 NOTE — PROGRESS NOTES
Daily Note     Today's date: 10/7/2019  Patient name: Celina House  : 1945  MRN: [id number]  Referring provider: Alessandro Mireles MD  Dx:   Encounter Diagnosis     ICD-10-CM    1  Acute pain of right knee M25 561                   Subjective:  I'm good  Last day today  Objective: See treatment diary below      Assessment: PT notes ROM -5 to 118 right knee  Strength 4+/5  No difficulty with U stance  Reports no pain  Reports no limitations with ambulation/ADL's  Pt requests discharge at this time  No TE per pt request       Plan: D/C PT services       Precautions:  S/P Right Quad Tendon Repair 7-10-19       Manual  9-3-19 9-27-19 10-1-19 10/7/19 8-29-19   RLE    10' 15'                                       Exercise Diary  9-3-19 9-27-19 10-1-19 10/7/29 8-29-19   Quad set     40x  5"   SLR     30x   Heel slides     30x with [employer]humika [employer] with LE's on roll        Calf raises in brace     30x    Stand hip flex/abd/ext in brace        Self HS stretch        Self Gastroc stretch        nustep     L4  15'   SAQ     40x   [address] [facility]  L3  12' L3  15'     Mini squats  30x 30x     Heel Raises  30x 30x     Step ups  30x  2 riser 30x  2 riser                                             HEP Instructed and issued HEP           Modalities 9-3-19 9-27-19 10-1-19 10/7/19 8-29-19   CP     Declined

## 2019-10-07 NOTE — PROGRESS NOTES
PT DISCHARGE     Today's date: 10/7/2019  Patient name: Miah Fairbanks  : 1945  MRN: [id number]  Referring provider: Kassi Hill MD  Dx:   Encounter Diagnosis     ICD-10-CM    1  Acute pain of right knee M25 561        Start Time: 1350  Stop Time: 1400  Total time in clinic (min): 10 minutes    Assessment  Assessment details: Pt presented s/p right quad tendon repair on 7-10-19  He has progressed very well  He reports no pain right knee  No instability noted  He has returned to golf and is performing all ADL's  Gait WNL  PT notes good progression of ROM and strength with minimal deficits remaining  No use of brace reported  Pt at this time requests to discharge PT services as he feels no further intervention required  D/C PT services per pt request     Impairments: abnormal gait, abnormal or restricted ROM, activity intolerance, impaired balance, impaired physical strength and lacks appropriate home exercise program    Goals  ST  Decrease pain 50% 6 wk  2  Increase Knee ROM to 0 to 90 degrees 6 wk  3   Increase knee strength to 3+/5 6 wk  LT  Pt will report no pain 12 wk  2  Increase Knee ROM to WNL 12 wk  3  Increase knee strength to WNL 12 wk  4  Pt will report no limitations with ambulation 12 wk  5    Pt will report no limitations with ADL's 12 wk    Plan  Plan details: D/C PT services  Patient would benefit from: PT eval  Planned modality interventions: cryotherapy, thermotherapy: hydrocollator packs and unattended electrical stimulation  Planned therapy interventions: manual therapy, balance, strengthening, stretching, therapeutic activities, therapeutic exercise, functional ROM exercises, flexibility and home exercise program  Treatment plan discussed with: patient        Subjective Evaluation    History of Present Illness  Date of onset: 2019  Date of surgery: 7/10/2019  Mechanism of injury: Pt reports on 19 his dog ran into the back of his right knee resulting in quad tendon rupture  Had surgical repair on 7-10-19  Pt was in long leg brace locked in full extension until 19  Pt brace opened to 30 degree flexion on 19  Reports he is now ambulating no assistive device in brace  Has been golfing playing 6 holes at a time  Pain  Current pain ratin  At best pain ratin  At worst pain ratin  Location: Right Knee     Exercise history: Hunting, Golfing,          Objective     Observations     Right Knee   Positive for incision       Tenderness     Additional Tenderness Details  No tenderness to palpation noted    Neurological Testing     Additional Neurological Details  Diminished sensation to light touch lateral border of incision    Active Range of Motion     Right Knee   Flexion: 118 degrees   Extensor la degrees     Mobility   Patellar Mobility:     Right Knee   WFL: medial, lateral, superior and inferior    Strength/Myotome Testing     Left Knee   Flexion: 5  Extension: 5    Right Knee   Flexion: 4+  Extension: 4+  Quadriceps contraction: good    Ambulation     Ambulation: Stairs   Ascend stairs: independent  Pattern: reciprocal  Railings: two rails  Descend stairs: independent  Pattern: reciprocal  Railings: two rails    Observational Gait   Gait: within functional limits

## 2019-10-15 DIAGNOSIS — I10 ESSENTIAL HYPERTENSION: Primary | ICD-10-CM

## 2019-10-15 DIAGNOSIS — N42.9 PROSTATE DISORDER: ICD-10-CM

## 2019-11-04 ENCOUNTER — OFFICE VISIT (OUTPATIENT)
Dept: INTERNAL MEDICINE CLINIC | Facility: CLINIC | Age: 74
End: 2019-11-04
Payer: COMMERCIAL

## 2019-11-04 VITALS
DIASTOLIC BLOOD PRESSURE: 71 MMHG | OXYGEN SATURATION: 98 % | HEIGHT: 67 IN | WEIGHT: 191 LBS | SYSTOLIC BLOOD PRESSURE: 141 MMHG | HEART RATE: 51 BPM | BODY MASS INDEX: 29.98 KG/M2

## 2019-11-04 DIAGNOSIS — I10 ESSENTIAL HYPERTENSION: Primary | ICD-10-CM

## 2019-11-04 DIAGNOSIS — L30.4 INTERTRIGO: ICD-10-CM

## 2019-11-04 DIAGNOSIS — E78.2 MIXED HYPERLIPIDEMIA: ICD-10-CM
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
## 2019-11-04 PROCEDURE — 99213 OFFICE O/P EST LOW 20 MIN: CPT | Performed by: INTERNAL MEDICINE

## 2019-11-04 RX ORDER — NYSTATIN 100000 U/G
CREAM TOPICAL 2 TIMES DAILY
Qty: 30 G | Refills: 3 | Status: CANCELLED | OUTPATIENT
Start: 2019-11-04

## 2019-11-04 RX ORDER — DOXYCYCLINE HYCLATE 100 MG/1
CAPSULE ORAL
Qty: 12 CAPSULE | Refills: 1 | Status: SHIPPED | OUTPATIENT
Start: 2019-11-04 | End: 2019-11-04

## 2019-11-04 RX ORDER — NYSTATIN 100000 U/G
CREAM TOPICAL 2 TIMES DAILY
Qty: 30 G | Refills: 5 | Status: SHIPPED | OUTPATIENT
Start: 2019-11-04 | End: 2020-03-03

## 2019-11-04 RX ORDER — NYSTATIN 100000 U/G
CREAM TOPICAL 2 TIMES DAILY
COMMUNITY
End: 2019-11-04 | Stop reason: SDUPTHER

## 2019-11-04 NOTE — PROGRESS NOTES
Assessment/Plan:       Diagnoses and all orders for this visit:    Essential hypertension    Mixed hyperlipidemia    Tick bite, initial encounter  -     doxycycline hyclate (VIBRAMYCIN) 100 mg capsule; 2 capsules right away for a tick bite    Intertrigo  -     nystatin (MYCOSTATIN) cream; Apply topically 2 (two) times a day    Other orders  -     Discontinue: nystatin (MYCOSTATIN) cream; Apply topically 2 (two) times a day  -     Cancel: nystatin (MYCOSTATIN) cream; Apply topically 2 (two) times a day                Subjective:      Patient ID: Radhika Forte is a 75 y o  male  A 76-year-old man who feels well except for the fact that he continues to complain of lumbar pain with bilateral neurogenic claudication and weakness, along with sx that suggest cervical disease too  He reports pain felt in both shoulders and upper arms radiating down the anterior aspect of both arms towards the elbows  Blood pressure:  Checks it at home in his normal   Here it is a bit higher  I had given him a dose of metoprolol in number of years ago but he got excessively bradycardic so he does not want to take medication if he can help  Echocardiogram was done in 2014 and 2017 were normal    Hyperlipidemia is treated    Prostate health followed by urologist he does have BPH   However, last PSA 0 58     Over the summer he sustained an injury to the right knee; his dog ran into him from behind and in the resultant fall he tore his patellar tendon  He required surgery and a prolonged recovery but now he is back to his baseline  Asbestos exposure but no symptoms  Occasionally gets intertrigo in the groin    He goes hunting with his dog and will sometimes get bitten by ticks        The following portions of the patient's history were reviewed and updated as appropriate:   He has a past medical history of Abnormal EKG, Asbestosis ([address] ), History of sinus bradycardia, Hyperlipidemia, Prostate disorder, Shingles, and Spinal stenosis  ,  does not have any pertinent problems on file  ,   has a past surgical history that includes Appendectomy; Tonsillectomy; Neuroplasty / transposition median nerve at carpal tunnel; and Quadriceps tendon repair (Right)  ,  family history includes Colon cancer in his father; Diabetes in his father; Heart disease in his father; Hypertension in his father; Stroke in his father; Transient ischemic attack in his father  ,   reports that he has been smoking cigarettes  He has smoked for the past 50 00 years  He has never used smokeless tobacco  He reports that he drinks about 1 0 standard drinks of alcohol per week  He reports that he does not use drugs  ,  is allergic to corticosteroids and lisinopril     Current Outpatient Medications   Medication Sig Dispense Refill    aspirin (ECOTRIN LOW STRENGTH) 81 mg EC tablet Take 1 tablet by mouth daily      atorvastatin (LIPITOR) 10 mg tablet TAKE 1 TABLET BY MOUTH EVERY DAY 90 tablet 3    fluticasone (FLONASE) 50 mcg/act nasal spray PLACE 1 SPRAY INTO EACH NOSTRIL DAILY 1 Bottle 5    HYDROcodone-acetaminophen (NORCO) 5-325 mg per tablet Every 6 hours      nystatin (MYCOSTATIN) cream Apply topically 2 (two) times a day 30 g 5    doxycycline hyclate (VIBRAMYCIN) 100 mg capsule 2 capsules right away for a tick bite 12 capsule 1    fluticasone (FLONASE ALLERGY RELIEF) 50 mcg/act nasal spray 2 sprays into each nostril daily      gabapentin (NEURONTIN) 300 mg capsule Daily      omeprazole (PriLOSEC) 20 mg delayed release capsule Take 1 capsule (20 mg total) by mouth daily for 90 days 90 capsule 3     No current facility-administered medications for this visit  Review of Systems   Constitutional: Negative for chills and fever  HENT: Negative for sore throat and trouble swallowing  Eyes: Negative for pain  Respiratory: Negative for cough and shortness of breath  Cardiovascular: Negative for chest pain and palpitations     Gastrointestinal: Negative for abdominal pain, blood in stool, diarrhea, nausea and vomiting  Endocrine: Negative for cold intolerance and heat intolerance  Genitourinary: Negative for dysuria, frequency and testicular pain  Musculoskeletal: Positive for arthralgias, back pain, neck pain and neck stiffness  Negative for joint swelling  Skin: Positive for rash  Allergic/Immunologic: Negative for immunocompromised state  Neurological: Negative for dizziness, syncope and headaches  Hematological: Negative for adenopathy  Does not bruise/bleed easily  Psychiatric/Behavioral: Negative for dysphoric mood  The patient is not nervous/anxious  Objective:  Vitals:    11/04/19 0747   BP: 150/72   Pulse: (!) 51   SpO2: 98%      Physical Exam   Constitutional: He is oriented to person, place, and time  He appears well-developed and well-nourished  A male patient who appears well  He does appear to be his stated age  HENT:   Head: Normocephalic and atraumatic  Eyes: Pupils are equal, round, and reactive to light  Neck: Normal range of motion  Neck supple  No tracheal deviation present  No thyromegaly present  Cardiovascular: Normal rate and regular rhythm  Exam reveals no gallop  Murmur heard  Decrescendo systolic murmur is present with a grade of 1/6  Grade 1/6 early decrescendo murmur heard best right sternal border 2nd intercostal space without radiation   Pulmonary/Chest: Effort normal  No respiratory distress  He has no wheezes  He has no rales  Abdominal: Soft  Bowel sounds are normal  There is no tenderness  Musculoskeletal: Normal range of motion  He exhibits no tenderness or deformity  Neurological: He is alert and oriented to person, place, and time  He has normal reflexes  Coordination normal    Skin: Skin is warm and dry  No intertrigo at present  Well-healed scar vertically overlying right knee   Psychiatric: He has a normal mood and affect           Patient Instructions     Blood pressure is good enough without medication   Cholesterol treated   PSA normal   Colonoscopy he up-to-date  Fungal intertrigo may be treated with nystatin    For tick bite take doxycycline 2 caps immediately upon noticing bite

## 2019-11-04 NOTE — PATIENT INSTRUCTIONS
Blood pressure is good enough without medication   Cholesterol treated   PSA normal   Colonoscopy he up-to-date  Fungal intertrigo may be treated with nystatin    For tick bite take doxycycline 2 caps immediately upon noticing bite

## 2019-12-26 DIAGNOSIS — J30.89 NON-SEASONAL ALLERGIC RHINITIS, UNSPECIFIED TRIGGER: ICD-10-CM

## 2019-12-26 RX ORDER — FLUTICASONE PROPIONATE 50 MCG
SPRAY, SUSPENSION (ML) NASAL
Qty: 16 ML | Refills: 5 | Status: SHIPPED | OUTPATIENT
Start: 2019-12-26 | End: 2020-03-03

## 2020-02-03 ENCOUNTER — OFFICE VISIT (OUTPATIENT)
Dept: INTERNAL MEDICINE CLINIC | Facility: CLINIC | Age: 75
End: 2020-02-03
Payer: COMMERCIAL

## 2020-02-03 VITALS
BODY MASS INDEX: 29.82 KG/M2 | OXYGEN SATURATION: 97 % | HEART RATE: 67 BPM | SYSTOLIC BLOOD PRESSURE: 130 MMHG | WEIGHT: 190 LBS | HEIGHT: 67 IN | DIASTOLIC BLOOD PRESSURE: 90 MMHG

## 2020-02-03 DIAGNOSIS — H61.21 IMPACTED CERUMEN OF RIGHT EAR: Primary | ICD-10-CM

## 2020-02-03 PROCEDURE — 3075F SYST BP GE 130 - 139MM HG: CPT | Performed by: INTERNAL MEDICINE

## 2020-02-03 PROCEDURE — 3008F BODY MASS INDEX DOCD: CPT | Performed by: INTERNAL MEDICINE

## 2020-02-03 PROCEDURE — 99213 OFFICE O/P EST LOW 20 MIN: CPT | Performed by: INTERNAL MEDICINE

## 2020-02-03 PROCEDURE — 1160F RVW MEDS BY RX/DR IN RCRD: CPT | Performed by: INTERNAL MEDICINE

## 2020-02-03 PROCEDURE — 4040F PNEUMOC VAC/ADMIN/RCVD: CPT | Performed by: INTERNAL MEDICINE

## 2020-02-03 PROCEDURE — 3080F DIAST BP >= 90 MM HG: CPT | Performed by: INTERNAL MEDICINE

## 2020-02-03 NOTE — PROGRESS NOTES
Assessment/Plan:       Diagnoses and all orders for this visit:    Impacted cerumen of right ear                Subjective:      Patient ID: Farheen Justice is a 75 y o  male  Hearing loss right ear for several days  A patient with this complaint  On inspection he has a cerumen impaction which was successfully flushed  Hearing was restored  The following portions of the patient's history were reviewed and updated as appropriate:   He has a past medical history of Abnormal EKG, Asbestosis ([address] ), History of sinus bradycardia, Hyperlipidemia, Prostate disorder, Shingles, and Spinal stenosis  ,  does not have any pertinent problems on file  ,   has a past surgical history that includes Appendectomy; Tonsillectomy; Neuroplasty / transposition median nerve at carpal tunnel; and Quadriceps tendon repair (Right)  ,  family history includes Colon cancer in his father; Diabetes in his father; Heart disease in his father; Hypertension in his father; Stroke in his father; Transient ischemic attack in his father  ,   reports that he has been smoking cigarettes  He has smoked for the past 50 00 years  He has never used smokeless tobacco  He reports that he drinks about 1 0 standard drinks of alcohol per week  He reports that he does not use drugs  ,  is allergic to corticosteroids and lisinopril     Current Outpatient Medications   Medication Sig Dispense Refill    aspirin (ECOTRIN LOW STRENGTH) 81 mg EC tablet Take 1 tablet by mouth daily      atorvastatin (LIPITOR) 10 mg tablet TAKE 1 TABLET BY MOUTH EVERY DAY 90 tablet 3    fluticasone (FLONASE) 50 mcg/act nasal spray PLACE 1 SPRAY INTO EACH NOSTRIL DAILY (Patient taking differently: No sig reported) 16 mL 5    HYDROcodone-acetaminophen (NORCO) 5-325 mg per tablet Every 6 hours      gabapentin (NEURONTIN) 300 mg capsule Daily      nystatin (MYCOSTATIN) cream Apply topically 2 (two) times a day (Patient not taking: Reported on 2/3/2020) 30 g 5    omeprazole (PriLOSEC) 20 mg delayed release capsule Take 1 capsule (20 mg total) by mouth daily for 90 days (Patient not taking: Reported on 2/3/2020) 90 capsule 3     No current facility-administered medications for this visit  Review of Systems   HENT: Positive for congestion and hearing loss  Respiratory: Negative for cough, shortness of breath and wheezing  Neurological: Negative for headaches  Objective:  Vitals:    02/03/20 1045   BP: 130/90   Pulse: 67   SpO2: 97%      Physical Exam   Constitutional: He appears well-developed and well-nourished  HENT:   Right Ear: Decreased hearing is noted     Left Ear: Hearing, tympanic membrane and ear canal normal      Cerumen impaction right ear         Patient Instructions    Cerumen impaction successfully flushed

## 2020-03-03 ENCOUNTER — OFFICE VISIT (OUTPATIENT)
Dept: INTERNAL MEDICINE CLINIC | Facility: CLINIC | Age: 75
End: 2020-03-03
Payer: COMMERCIAL

## 2020-03-03 ENCOUNTER — APPOINTMENT (OUTPATIENT)
Dept: LAB | Facility: CLINIC | Age: 75
End: 2020-03-03
Payer: COMMERCIAL

## 2020-03-03 VITALS
HEIGHT: 67 IN | WEIGHT: 195 LBS | HEART RATE: 64 BPM | DIASTOLIC BLOOD PRESSURE: 82 MMHG | OXYGEN SATURATION: 97 % | SYSTOLIC BLOOD PRESSURE: 140 MMHG | BODY MASS INDEX: 30.61 KG/M2

## 2020-03-03 DIAGNOSIS — R59.0 ANTERIOR CERVICAL ADENOPATHY: ICD-10-CM

## 2020-03-03 DIAGNOSIS — L73.9 SUPERFICIAL FOLLICULITIS: ICD-10-CM

## 2020-03-03 DIAGNOSIS — M48.02 CERVICAL SPINAL STENOSIS: ICD-10-CM

## 2020-03-03 LAB — S PYO AG THROAT QL: NEGATIVE

## 2020-03-03 PROCEDURE — 87070 CULTURE OTHR SPECIMN AEROBIC: CPT

## 2020-03-03 PROCEDURE — 3008F BODY MASS INDEX DOCD: CPT | Performed by: INTERNAL MEDICINE

## 2020-03-03 PROCEDURE — 4040F PNEUMOC VAC/ADMIN/RCVD: CPT | Performed by: INTERNAL MEDICINE

## 2020-03-03 PROCEDURE — 3079F DIAST BP 80-89 MM HG: CPT | Performed by: INTERNAL MEDICINE

## 2020-03-03 PROCEDURE — 99214 OFFICE O/P EST MOD 30 MIN: CPT | Performed by: INTERNAL MEDICINE

## 2020-03-03 PROCEDURE — 1160F RVW MEDS BY RX/DR IN RCRD: CPT | Performed by: INTERNAL MEDICINE

## 2020-03-03 PROCEDURE — 87147 CULTURE TYPE IMMUNOLOGIC: CPT

## 2020-03-03 PROCEDURE — 87205 SMEAR GRAM STAIN: CPT

## 2020-03-03 PROCEDURE — 3077F SYST BP >= 140 MM HG: CPT | Performed by: INTERNAL MEDICINE

## 2020-03-03 PROCEDURE — 87880 STREP A ASSAY W/OPTIC: CPT | Performed by: INTERNAL MEDICINE

## 2020-03-03 NOTE — PATIENT INSTRUCTIONS
Enlarged anterior cervical lymph node with negative rapid strep - this appears to be resolving on its own  Further follow-up if no complete resolution  Superficial folliculitis noted  Will obtain culture to look for MR      I can prescribe his Vicodin    He will sign a pain management agreement

## 2020-03-06 LAB
BACTERIA WND AEROBE CULT: ABNORMAL
GRAM STN SPEC: ABNORMAL

## 2020-05-15 ENCOUNTER — TELEPHONE (OUTPATIENT)
Dept: INTERNAL MEDICINE CLINIC | Facility: CLINIC | Age: 75
End: 2020-05-15

## 2020-05-18 ENCOUNTER — OFFICE VISIT (OUTPATIENT)
Dept: INTERNAL MEDICINE CLINIC | Facility: CLINIC | Age: 75
End: 2020-05-18
Payer: COMMERCIAL

## 2020-05-18 VITALS
HEIGHT: 67 IN | RESPIRATION RATE: 12 BRPM | DIASTOLIC BLOOD PRESSURE: 92 MMHG | TEMPERATURE: 98.4 F | BODY MASS INDEX: 30.23 KG/M2 | SYSTOLIC BLOOD PRESSURE: 148 MMHG | WEIGHT: 192.6 LBS | HEART RATE: 52 BPM

## 2020-05-18 DIAGNOSIS — J61 ASBESTOSIS (HCC): ICD-10-CM

## 2020-05-18 DIAGNOSIS — M48.02 CERVICAL SPINAL STENOSIS: ICD-10-CM

## 2020-05-18 DIAGNOSIS — R59.0 ANTERIOR CERVICAL ADENOPATHY: Primary | ICD-10-CM

## 2020-05-18 PROCEDURE — 3077F SYST BP >= 140 MM HG: CPT | Performed by: INTERNAL MEDICINE

## 2020-05-18 PROCEDURE — 99213 OFFICE O/P EST LOW 20 MIN: CPT | Performed by: INTERNAL MEDICINE

## 2020-05-18 PROCEDURE — 1100F PTFALLS ASSESS-DOCD GE2>/YR: CPT | Performed by: INTERNAL MEDICINE

## 2020-05-18 PROCEDURE — 3080F DIAST BP >= 90 MM HG: CPT | Performed by: INTERNAL MEDICINE

## 2020-05-18 PROCEDURE — 3288F FALL RISK ASSESSMENT DOCD: CPT | Performed by: INTERNAL MEDICINE

## 2020-05-18 PROCEDURE — 3008F BODY MASS INDEX DOCD: CPT | Performed by: INTERNAL MEDICINE

## 2020-05-18 PROCEDURE — 4040F PNEUMOC VAC/ADMIN/RCVD: CPT | Performed by: INTERNAL MEDICINE

## 2020-05-18 PROCEDURE — 1160F RVW MEDS BY RX/DR IN RCRD: CPT | Performed by: INTERNAL MEDICINE

## 2020-05-18 RX ORDER — HYDROCODONE BITARTRATE AND ACETAMINOPHEN 5; 325 MG/1; MG/1
2 TABLET ORAL EVERY 6 HOURS PRN
Qty: 100 TABLET | Refills: 0 | Status: SHIPPED | OUTPATIENT
Start: 2020-05-18 | End: 2020-07-15 | Stop reason: SDUPTHER

## 2020-05-18 RX ORDER — AMOXICILLIN 500 MG/1
500 CAPSULE ORAL EVERY 8 HOURS SCHEDULED
COMMUNITY
End: 2020-11-24

## 2020-05-19 ENCOUNTER — APPOINTMENT (OUTPATIENT)
Dept: LAB | Facility: HOSPITAL | Age: 75
End: 2020-05-19
Attending: INTERNAL MEDICINE
Payer: COMMERCIAL

## 2020-05-19 DIAGNOSIS — R59.0 ANTERIOR CERVICAL ADENOPATHY: ICD-10-CM

## 2020-05-19 LAB
ANION GAP SERPL CALCULATED.3IONS-SCNC: 5 MMOL/L (ref 4–13)
BASOPHILS # BLD AUTO: 0.03 THOUSANDS/ΜL (ref 0–0.1)
BASOPHILS NFR BLD AUTO: 1 % (ref 0–1)
BUN SERPL-MCNC: 14 MG/DL (ref 5–25)
CALCIUM SERPL-MCNC: 8.7 MG/DL (ref 8.3–10.1)
CHLORIDE SERPL-SCNC: 104 MMOL/L (ref 100–108)
CO2 SERPL-SCNC: 30 MMOL/L (ref 21–32)
CREAT SERPL-MCNC: 1.24 MG/DL (ref 0.6–1.3)
EOSINOPHIL # BLD AUTO: 0.1 THOUSAND/ΜL (ref 0–0.61)
EOSINOPHIL NFR BLD AUTO: 2 % (ref 0–6)
ERYTHROCYTE [DISTWIDTH] IN BLOOD BY AUTOMATED COUNT: 12.8 % (ref 11.6–15.1)
GFR SERPL CREATININE-BSD FRML MDRD: 57 ML/MIN/1.73SQ M
GLUCOSE P FAST SERPL-MCNC: 114 MG/DL (ref 65–99)
HCT VFR BLD AUTO: 43.2 % (ref 36.5–49.3)
HGB BLD-MCNC: 14.4 G/DL (ref 12–17)
IMM GRANULOCYTES # BLD AUTO: 0.01 THOUSAND/UL (ref 0–0.2)
IMM GRANULOCYTES NFR BLD AUTO: 0 % (ref 0–2)
LYMPHOCYTES # BLD AUTO: 1.23 THOUSANDS/ΜL (ref 0.6–4.47)
LYMPHOCYTES NFR BLD AUTO: 22 % (ref 14–44)
MCH RBC QN AUTO: 31.1 PG (ref 26.8–34.3)
MCHC RBC AUTO-ENTMCNC: 33.3 G/DL (ref 31.4–37.4)
MCV RBC AUTO: 93 FL (ref 82–98)
MONOCYTES # BLD AUTO: 0.55 THOUSAND/ΜL (ref 0.17–1.22)
MONOCYTES NFR BLD AUTO: 10 % (ref 4–12)
NEUTROPHILS # BLD AUTO: 3.79 THOUSANDS/ΜL (ref 1.85–7.62)
NEUTS SEG NFR BLD AUTO: 65 % (ref 43–75)
NRBC BLD AUTO-RTO: 0 /100 WBCS
PLATELET # BLD AUTO: 223 THOUSANDS/UL (ref 149–390)
PMV BLD AUTO: 9.5 FL (ref 8.9–12.7)
POTASSIUM SERPL-SCNC: 4.5 MMOL/L (ref 3.5–5.3)
RBC # BLD AUTO: 4.63 MILLION/UL (ref 3.88–5.62)
SODIUM SERPL-SCNC: 139 MMOL/L (ref 136–145)
WBC # BLD AUTO: 5.71 THOUSAND/UL (ref 4.31–10.16)

## 2020-05-19 PROCEDURE — 80048 BASIC METABOLIC PNL TOTAL CA: CPT

## 2020-05-19 PROCEDURE — 85025 COMPLETE CBC W/AUTO DIFF WBC: CPT

## 2020-05-19 PROCEDURE — 36415 COLL VENOUS BLD VENIPUNCTURE: CPT

## 2020-05-23 ENCOUNTER — HOSPITAL ENCOUNTER (OUTPATIENT)
Dept: CT IMAGING | Facility: HOSPITAL | Age: 75
Discharge: HOME | End: 2020-05-23
Attending: INTERNAL MEDICINE
Payer: COMMERCIAL

## 2020-05-23 DIAGNOSIS — R59.0 ANTERIOR CERVICAL ADENOPATHY: ICD-10-CM

## 2020-05-23 PROCEDURE — 70491 CT SOFT TISSUE NECK W/DYE: CPT

## 2020-05-23 RX ADMIN — IOHEXOL 85 ML: 350 INJECTION, SOLUTION INTRAVENOUS at 13:42

## 2020-05-27 ENCOUNTER — TELEPHONE (OUTPATIENT)
Dept: INTERNAL MEDICINE CLINIC | Facility: CLINIC | Age: 75
End: 2020-05-27

## 2020-07-15 ENCOUNTER — TELEPHONE (OUTPATIENT)
Dept: INTERNAL MEDICINE CLINIC | Facility: CLINIC | Age: 75
End: 2020-07-15

## 2020-07-15 DIAGNOSIS — M48.02 CERVICAL SPINAL STENOSIS: ICD-10-CM

## 2020-07-15 RX ORDER — HYDROCODONE BITARTRATE AND ACETAMINOPHEN 5; 325 MG/1; MG/1
2 TABLET ORAL EVERY 6 HOURS PRN
Qty: 100 TABLET | Refills: 0 | Status: SHIPPED | OUTPATIENT
Start: 2020-07-15 | End: 2020-09-21 | Stop reason: SDUPTHER

## 2020-07-15 NOTE — TELEPHONE ENCOUNTER
Refill on:  HYDROcodone-acetaminophen (NORCO) 5-325 mg per tablet    Also stating he needs something for his leg cramps    [facility]

## 2020-08-09 DIAGNOSIS — J30.89 NON-SEASONAL ALLERGIC RHINITIS, UNSPECIFIED TRIGGER: ICD-10-CM

## 2020-08-10 RX ORDER — FLUTICASONE PROPIONATE 50 MCG
SPRAY, SUSPENSION (ML) NASAL
Qty: 16 ML | Refills: 5 | Status: SHIPPED | OUTPATIENT
Start: 2020-08-10 | End: 2021-01-28

## 2020-09-21 DIAGNOSIS — M48.02 CERVICAL SPINAL STENOSIS: ICD-10-CM

## 2020-09-21 NOTE — TELEPHONE ENCOUNTER
Call back # [phone]    HYDROcodone-acetaminophen (NORCO) 5-325 mg per tablet    [facility] # [phone]

## 2020-09-22 RX ORDER — HYDROCODONE BITARTRATE AND ACETAMINOPHEN 5; 325 MG/1; MG/1
2 TABLET ORAL EVERY 6 HOURS PRN
Qty: 100 TABLET | Refills: 0 | Status: SHIPPED | OUTPATIENT
Start: 2020-09-22 | End: 2020-11-09 | Stop reason: SDUPTHER

## 2020-09-22 NOTE — TELEPHONE ENCOUNTER
PER [facility], HYDROcodone-acetaminophen (NORCO) 5-325 mg per tablet  REQUIRES PRIOR AUTHORIZATION

## 2020-11-09 DIAGNOSIS — E78.00 HIGH CHOLESTEROL: ICD-10-CM

## 2020-11-09 DIAGNOSIS — M48.02 CERVICAL SPINAL STENOSIS: ICD-10-CM

## 2020-11-09 RX ORDER — HYDROCODONE BITARTRATE AND ACETAMINOPHEN 5; 325 MG/1; MG/1
2 TABLET ORAL EVERY 6 HOURS PRN
Qty: 100 TABLET | Refills: 0 | Status: SHIPPED | OUTPATIENT
Start: 2020-11-09 | End: 2021-01-15 | Stop reason: SDUPTHER

## 2020-11-09 RX ORDER — ATORVASTATIN CALCIUM 10 MG/1
10 TABLET, FILM COATED ORAL DAILY
Qty: 90 TABLET | Refills: 3 | Status: SHIPPED | OUTPATIENT
Start: 2020-11-09 | End: 2021-11-14

## 2020-11-24 ENCOUNTER — OFFICE VISIT (OUTPATIENT)
Dept: INTERNAL MEDICINE CLINIC | Facility: CLINIC | Age: 75
End: 2020-11-24
Payer: COMMERCIAL

## 2020-11-24 VITALS
BODY MASS INDEX: 29.98 KG/M2 | SYSTOLIC BLOOD PRESSURE: 140 MMHG | HEART RATE: 54 BPM | HEIGHT: 67 IN | WEIGHT: 191 LBS | DIASTOLIC BLOOD PRESSURE: 80 MMHG | OXYGEN SATURATION: 95 % | TEMPERATURE: 97.6 F

## 2020-11-24 DIAGNOSIS — E78.2 MIXED HYPERLIPIDEMIA: ICD-10-CM

## 2020-11-24 DIAGNOSIS — I10 ESSENTIAL HYPERTENSION: Primary | ICD-10-CM

## 2020-11-24 DIAGNOSIS — N42.9 PROSTATE DISORDER: ICD-10-CM

## 2020-11-24 DIAGNOSIS — Z23 NEED FOR VACCINATION: ICD-10-CM

## 2020-11-24 PROCEDURE — G0008 ADMIN INFLUENZA VIRUS VAC: HCPCS | Performed by: INTERNAL MEDICINE

## 2020-11-24 PROCEDURE — 99213 OFFICE O/P EST LOW 20 MIN: CPT | Performed by: INTERNAL MEDICINE

## 2020-11-24 PROCEDURE — 3079F DIAST BP 80-89 MM HG: CPT | Performed by: INTERNAL MEDICINE

## 2020-11-24 PROCEDURE — 3725F SCREEN DEPRESSION PERFORMED: CPT | Performed by: INTERNAL MEDICINE

## 2020-11-24 PROCEDURE — 3077F SYST BP >= 140 MM HG: CPT | Performed by: INTERNAL MEDICINE

## 2020-11-24 PROCEDURE — 90662 IIV NO PRSV INCREASED AG IM: CPT | Performed by: INTERNAL MEDICINE

## 2020-11-24 PROCEDURE — 1160F RVW MEDS BY RX/DR IN RCRD: CPT | Performed by: INTERNAL MEDICINE

## 2021-01-15 DIAGNOSIS — M48.02 CERVICAL SPINAL STENOSIS: ICD-10-CM

## 2021-01-15 RX ORDER — HYDROCODONE BITARTRATE AND ACETAMINOPHEN 5; 325 MG/1; MG/1
2 TABLET ORAL EVERY 6 HOURS PRN
Qty: 100 TABLET | Refills: 0 | Status: SHIPPED | OUTPATIENT
Start: 2021-01-15 | End: 2021-03-12 | Stop reason: SDUPTHER

## 2021-01-19 ENCOUNTER — LAB (OUTPATIENT)
Dept: LAB | Facility: HOSPITAL | Age: 76
End: 2021-01-19
Attending: INTERNAL MEDICINE
Payer: COMMERCIAL

## 2021-01-19 DIAGNOSIS — E78.2 MIXED HYPERLIPIDEMIA: ICD-10-CM

## 2021-01-19 DIAGNOSIS — N42.9 PROSTATE DISORDER: ICD-10-CM

## 2021-01-19 DIAGNOSIS — I10 ESSENTIAL HYPERTENSION: ICD-10-CM

## 2021-01-19 LAB
ALBUMIN SERPL BCP-MCNC: 3.9 G/DL (ref 3.5–5)
ALP SERPL-CCNC: 38 U/L (ref 46–116)
ALT SERPL W P-5'-P-CCNC: 31 U/L (ref 12–78)
ANION GAP SERPL CALCULATED.3IONS-SCNC: 7 MMOL/L (ref 4–13)
AST SERPL W P-5'-P-CCNC: 21 U/L (ref 5–45)
BACTERIA UR QL AUTO: NORMAL /HPF
BASOPHILS # BLD AUTO: 0.04 THOUSANDS/ΜL (ref 0–0.1)
BASOPHILS NFR BLD AUTO: 1 % (ref 0–1)
BILIRUB SERPL-MCNC: 0.6 MG/DL (ref 0.2–1)
BILIRUB UR QL STRIP: NEGATIVE
BUN SERPL-MCNC: 20 MG/DL (ref 5–25)
CALCIUM SERPL-MCNC: 9.3 MG/DL (ref 8.3–10.1)
CHLORIDE SERPL-SCNC: 103 MMOL/L (ref 100–108)
CHOLEST SERPL-MCNC: 136 MG/DL (ref 50–200)
CLARITY UR: CLEAR
CO2 SERPL-SCNC: 30 MMOL/L (ref 21–32)
COLOR UR: YELLOW
CREAT SERPL-MCNC: 1.32 MG/DL (ref 0.6–1.3)
EOSINOPHIL # BLD AUTO: 0.09 THOUSAND/ΜL (ref 0–0.61)
EOSINOPHIL NFR BLD AUTO: 1 % (ref 0–6)
ERYTHROCYTE [DISTWIDTH] IN BLOOD BY AUTOMATED COUNT: 12.6 % (ref 11.6–15.1)
GFR SERPL CREATININE-BSD FRML MDRD: 52 ML/MIN/1.73SQ M
GLUCOSE P FAST SERPL-MCNC: 103 MG/DL (ref 65–99)
GLUCOSE UR STRIP-MCNC: NEGATIVE MG/DL
HCT VFR BLD AUTO: 44.8 % (ref 36.5–49.3)
HDLC SERPL-MCNC: 44 MG/DL
HGB BLD-MCNC: 14.8 G/DL (ref 12–17)
HGB UR QL STRIP.AUTO: ABNORMAL
IMM GRANULOCYTES # BLD AUTO: 0.02 THOUSAND/UL (ref 0–0.2)
IMM GRANULOCYTES NFR BLD AUTO: 0 % (ref 0–2)
KETONES UR STRIP-MCNC: NEGATIVE MG/DL
LDLC SERPL CALC-MCNC: 77 MG/DL (ref 0–100)
LEUKOCYTE ESTERASE UR QL STRIP: NEGATIVE
LYMPHOCYTES # BLD AUTO: 1.44 THOUSANDS/ΜL (ref 0.6–4.47)
LYMPHOCYTES NFR BLD AUTO: 21 % (ref 14–44)
MCH RBC QN AUTO: 30.5 PG (ref 26.8–34.3)
MCHC RBC AUTO-ENTMCNC: 33 G/DL (ref 31.4–37.4)
MCV RBC AUTO: 92 FL (ref 82–98)
MONOCYTES # BLD AUTO: 0.59 THOUSAND/ΜL (ref 0.17–1.22)
MONOCYTES NFR BLD AUTO: 8 % (ref 4–12)
NEUTROPHILS # BLD AUTO: 4.86 THOUSANDS/ΜL (ref 1.85–7.62)
NEUTS SEG NFR BLD AUTO: 69 % (ref 43–75)
NITRITE UR QL STRIP: NEGATIVE
NON-SQ EPI CELLS URNS QL MICRO: NORMAL /HPF
NRBC BLD AUTO-RTO: 0 /100 WBCS
PH UR STRIP.AUTO: 7 [PH]
PLATELET # BLD AUTO: 231 THOUSANDS/UL (ref 149–390)
PMV BLD AUTO: 9.3 FL (ref 8.9–12.7)
POTASSIUM SERPL-SCNC: 4.6 MMOL/L (ref 3.5–5.3)
PROT SERPL-MCNC: 7.6 G/DL (ref 6.4–8.2)
PROT UR STRIP-MCNC: NEGATIVE MG/DL
PSA SERPL-MCNC: 0.6 NG/ML (ref 0–4)
RBC # BLD AUTO: 4.86 MILLION/UL (ref 3.88–5.62)
RBC #/AREA URNS AUTO: NORMAL /HPF
SODIUM SERPL-SCNC: 140 MMOL/L (ref 136–145)
SP GR UR STRIP.AUTO: 1.02 (ref 1–1.03)
TRIGL SERPL-MCNC: 77 MG/DL
TSH SERPL DL<=0.05 MIU/L-ACNC: 1.44 UIU/ML (ref 0.36–3.74)
UROBILINOGEN UR QL STRIP.AUTO: 0.2 E.U./DL
WBC # BLD AUTO: 7.04 THOUSAND/UL (ref 4.31–10.16)
WBC #/AREA URNS AUTO: NORMAL /HPF

## 2021-01-19 PROCEDURE — 80053 COMPREHEN METABOLIC PANEL: CPT

## 2021-01-19 PROCEDURE — 84153 ASSAY OF PSA TOTAL: CPT

## 2021-01-19 PROCEDURE — 81001 URINALYSIS AUTO W/SCOPE: CPT | Performed by: INTERNAL MEDICINE

## 2021-01-19 PROCEDURE — 84443 ASSAY THYROID STIM HORMONE: CPT

## 2021-01-19 PROCEDURE — 85025 COMPLETE CBC W/AUTO DIFF WBC: CPT

## 2021-01-19 PROCEDURE — 36415 COLL VENOUS BLD VENIPUNCTURE: CPT

## 2021-01-19 PROCEDURE — 80061 LIPID PANEL: CPT

## 2021-01-22 DIAGNOSIS — Z23 ENCOUNTER FOR IMMUNIZATION: ICD-10-CM

## 2021-01-28 DIAGNOSIS — J30.89 NON-SEASONAL ALLERGIC RHINITIS, UNSPECIFIED TRIGGER: ICD-10-CM

## 2021-01-28 RX ORDER — FLUTICASONE PROPIONATE 50 MCG
SPRAY, SUSPENSION (ML) NASAL
Qty: 48 ML | Refills: 1 | Status: SHIPPED | OUTPATIENT
Start: 2021-01-28 | End: 2021-12-08 | Stop reason: SDUPTHER

## 2021-02-05 ENCOUNTER — IMMUNIZATIONS (OUTPATIENT)
Dept: FAMILY MEDICINE CLINIC | Facility: HOSPITAL | Age: 76
End: 2021-02-05

## 2021-02-05 DIAGNOSIS — Z23 ENCOUNTER FOR IMMUNIZATION: Primary | ICD-10-CM

## 2021-02-05 PROCEDURE — 0011A SARS-COV-2 / COVID-19 MRNA VACCINE (MODERNA) 100 MCG: CPT

## 2021-02-05 PROCEDURE — 91301 SARS-COV-2 / COVID-19 MRNA VACCINE (MODERNA) 100 MCG: CPT

## 2021-03-03 ENCOUNTER — IMMUNIZATIONS (OUTPATIENT)
Dept: FAMILY MEDICINE CLINIC | Facility: HOSPITAL | Age: 76
End: 2021-03-03

## 2021-03-03 DIAGNOSIS — Z23 ENCOUNTER FOR IMMUNIZATION: Primary | ICD-10-CM

## 2021-03-03 PROCEDURE — 0012A SARS-COV-2 / COVID-19 MRNA VACCINE (MODERNA) 100 MCG: CPT

## 2021-03-03 PROCEDURE — 91301 SARS-COV-2 / COVID-19 MRNA VACCINE (MODERNA) 100 MCG: CPT

## 2021-03-12 DIAGNOSIS — K21.9 GASTROESOPHAGEAL REFLUX DISEASE: ICD-10-CM

## 2021-03-12 DIAGNOSIS — M48.02 CERVICAL SPINAL STENOSIS: ICD-10-CM

## 2021-03-12 RX ORDER — HYDROCODONE BITARTRATE AND ACETAMINOPHEN 5; 325 MG/1; MG/1
2 TABLET ORAL EVERY 6 HOURS PRN
Qty: 100 TABLET | Refills: 0 | Status: SHIPPED | OUTPATIENT
Start: 2021-03-12 | End: 2021-05-26 | Stop reason: SDUPTHER

## 2021-03-12 RX ORDER — OMEPRAZOLE 20 MG/1
20 CAPSULE, DELAYED RELEASE ORAL DAILY
Qty: 90 CAPSULE | Refills: 1 | Status: SHIPPED | OUTPATIENT
Start: 2021-03-12 | End: 2022-07-06 | Stop reason: SDUPTHER

## 2021-03-12 NOTE — TELEPHONE ENCOUNTER
HYDROcodone-acetaminophen (NORCO) 5-325 mg per tablet    omeprazole (PriLOSEC) 20 mg delayed release capsule    [facility] # [phone]     # [phone]

## 2021-05-26 ENCOUNTER — TELEPHONE (OUTPATIENT)
Dept: INTERNAL MEDICINE CLINIC | Facility: CLINIC | Age: 76
End: 2021-05-26

## 2021-05-26 DIAGNOSIS — M48.02 CERVICAL SPINAL STENOSIS: ICD-10-CM

## 2021-05-27 RX ORDER — HYDROCODONE BITARTRATE AND ACETAMINOPHEN 5; 325 MG/1; MG/1
2 TABLET ORAL EVERY 6 HOURS PRN
Qty: 100 TABLET | Refills: 0 | Status: SHIPPED | OUTPATIENT
Start: 2021-05-27 | End: 2021-07-23 | Stop reason: SDUPTHER

## 2021-07-23 ENCOUNTER — OFFICE VISIT (OUTPATIENT)
Dept: INTERNAL MEDICINE CLINIC | Facility: CLINIC | Age: 76
End: 2021-07-23
Payer: COMMERCIAL

## 2021-07-23 VITALS
SYSTOLIC BLOOD PRESSURE: 142 MMHG | WEIGHT: 187 LBS | HEIGHT: 67 IN | DIASTOLIC BLOOD PRESSURE: 78 MMHG | TEMPERATURE: 97.9 F | BODY MASS INDEX: 29.35 KG/M2

## 2021-07-23 DIAGNOSIS — M48.02 CERVICAL SPINAL STENOSIS: ICD-10-CM

## 2021-07-23 PROCEDURE — 3288F FALL RISK ASSESSMENT DOCD: CPT | Performed by: INTERNAL MEDICINE

## 2021-07-23 PROCEDURE — 1101F PT FALLS ASSESS-DOCD LE1/YR: CPT | Performed by: INTERNAL MEDICINE

## 2021-07-23 PROCEDURE — 1160F RVW MEDS BY RX/DR IN RCRD: CPT | Performed by: INTERNAL MEDICINE

## 2021-07-23 PROCEDURE — 99214 OFFICE O/P EST MOD 30 MIN: CPT | Performed by: INTERNAL MEDICINE

## 2021-07-23 RX ORDER — HYDROCODONE BITARTRATE AND ACETAMINOPHEN 5; 325 MG/1; MG/1
2 TABLET ORAL EVERY 6 HOURS PRN
Qty: 100 TABLET | Refills: 0 | Status: SHIPPED | OUTPATIENT
Start: 2021-07-23 | End: 2021-09-29 | Stop reason: SDUPTHER

## 2021-07-23 RX ORDER — DOXYCYCLINE HYCLATE 100 MG/1
CAPSULE ORAL
COMMUNITY
End: 2022-01-19

## 2021-07-23 RX ORDER — AMOXICILLIN 500 MG/1
CAPSULE ORAL
COMMUNITY
End: 2022-01-19

## 2021-07-23 NOTE — PROGRESS NOTES
Assessment/Plan:       Diagnoses and all orders for this visit:    Cervical spinal stenosis  -     HYDROcodone-acetaminophen (NORCO) 5-325 mg per tablet; Take 2 tablets by mouth every 6 (six) hours as needed for painMax Daily Amount: 8 tablets    Other orders  -     amoxicillin (AMOXIL) 500 mg capsule; amoxicillin 500 mg capsule   TAKE 1 CAPSULE BY MOUTH 4 TIMES A DAY  -     doxycycline hyclate (VIBRAMYCIN) 100 mg capsule; doxycycline hyclate 100 mg capsule   TAKE 2 CAPSULES RIGHT AWAY FOR A TICK BITE                Subjective:      Patient ID: Nahomy Aguila is a 67 y o  male  Left facial paresthesia   A 67year-old reports that for severa monts he has had intermittent change in sensation left lower face  He does not have anesthesia  When he touches his face he can feel it and it feels the same both right left  He does not have hypoesthesia  He does not have hyperesthesia  However, he has a sensation of something unusual which he notes spontaneously  This occurs intermittently  It tends to occur more when he is seated  It sometimes can be aggravated by twisting his neck to the left  So, it appears that he has a peripheral paresthesia possibly related to some kind of nerve impingement of superficial nerves  Possibly the 7th nerve although those no motor component all  No facial droop     No dysarthria     No change in hearing     Currently seeing a chiropractor for treatment for muscle spasm  Also, occasionally he gets nocturnal charley horse and would like to have a recommendation  Minus to take magnesium 500 mg at night  Acute bicipital rupture be currently being treated by orthopedics   Spinal stenosis currently taking hydrocodone with good relief        The following portions of the patient's history were reviewed and updated as appropriate:   He has a past medical history of Abnormal EKG, Asbestosis ([address] ), History of sinus bradycardia, Hyperlipidemia, Prostate disorder, Shingles, and Spinal stenosis  ,  does not have any pertinent problems on file  ,   has a past surgical history that includes Appendectomy; Tonsillectomy; Neuroplasty / transposition median nerve at carpal tunnel; and Quadriceps tendon repair (Right)  ,  family history includes Colon cancer in his father; Diabetes in his father; Heart disease in his father; Hypertension in his father; Stroke in his father; Transient ischemic attack in his father  ,   reports that he has been smoking cigarettes  He has smoked for the past 50 00 years  He has never used smokeless tobacco  He reports current alcohol use of about 1 0 standard drinks of alcohol per week  He reports that he does not use drugs  ,  is allergic to corticosteroids and lisinopril     Current Outpatient Medications   Medication Sig Dispense Refill    aspirin (ECOTRIN LOW STRENGTH) 81 mg EC tablet Take 1 tablet by mouth daily      atorvastatin (LIPITOR) 10 mg tablet Take 1 tablet (10 mg total) by mouth daily 90 tablet 3    fluticasone (FLONASE) 50 mcg/act nasal spray PLACE 1 SPRAY INTO EACH NOSTRIL DAILY 48 mL 1    HYDROcodone-acetaminophen (NORCO) 5-325 mg per tablet Take 2 tablets by mouth every 6 (six) hours as needed for painMax Daily Amount: 8 tablets 100 tablet 0    omeprazole (PriLOSEC) 20 mg delayed release capsule Take 1 capsule (20 mg total) by mouth daily 90 capsule 1    amoxicillin (AMOXIL) 500 mg capsule amoxicillin 500 mg capsule   TAKE 1 CAPSULE BY MOUTH 4 TIMES A DAY      doxycycline hyclate (VIBRAMYCIN) 100 mg capsule doxycycline hyclate 100 mg capsule   TAKE 2 CAPSULES RIGHT AWAY FOR A TICK BITE       No current facility-administered medications for this visit  Review of Systems   Musculoskeletal: Positive for arthralgias and back pain  Neurological: Positive for numbness  Paresthesia   All other systems reviewed and are negative          Objective:  Vitals:    07/23/21 1648   BP: 142/78   Temp: 97 9 °F (36 6 °C)      Physical Exam  Constitutional:       Appearance: He is well-developed  HENT:      Head: Normocephalic and atraumatic  Eyes:      Pupils: Pupils are equal, round, and reactive to light  Neck:      Thyroid: No thyromegaly  Trachea: No tracheal deviation  Cardiovascular:      Rate and Rhythm: Normal rate and regular rhythm  Heart sounds: Normal heart sounds  No murmur heard  No gallop  Pulmonary:      Effort: Pulmonary effort is normal  No respiratory distress  Breath sounds: No wheezing or rales  Abdominal:      General: Bowel sounds are normal       Palpations: Abdomen is soft  Tenderness: There is no abdominal tenderness  Musculoskeletal:         General: Deformity present  No tenderness  Normal range of motion  Cervical back: Normal range of motion and neck supple  Comments:   "Choco "right biceps   Skin:     General: Skin is warm and dry  Neurological:      Mental Status: He is alert and oriented to person, place, and time  Coordination: Coordination normal       Deep Tendon Reflexes: Reflexes are normal and symmetric  Patient Instructions    A patient with peripheral paresthesia  This is not life-threatening  Recommendation is a multi vitamin regime to consist of:   Vitamin B12 1 mg daily   Thiamine 433 mg daily   Folic acid 1 mg daily  Magnesium 500 mg about 2 hours before bedtime for nocturnal cramps

## 2021-07-23 NOTE — PATIENT INSTRUCTIONS
A patient with peripheral paresthesia  This is not life-threatening  Recommendation is a multi vitamin regime to consist of:   Vitamin B12 1 mg daily   Thiamine 168 mg daily   Folic acid 1 mg daily  Magnesium 500 mg about 2 hours before bedtime for nocturnal cramps

## 2021-08-17 ENCOUNTER — RA CDI HCC (OUTPATIENT)
Dept: OTHER | Facility: HOSPITAL | Age: 76
End: 2021-08-17

## 2021-08-17 NOTE — PROGRESS NOTES
[place] [address]  coding opportunities             Chart Reviewed * (Number of) Inbasket suggestions sent to Provider: 1               Number of suggestions NOT actually used: 1     Patients insurance company: [address]  (Medicare [employer] and [employer])     Visit status: Patient arrived for their scheduled appointment     Provider never responded to [place] [address]  coding request     [place] [address]  coding opportunities        DX: F11 20 Opioid dependence, uncomplicated--on Hydrocodone    No MEAT for asbestosis     Chart Reviewed * (Number of) Inbasket suggestions sent to Provider: 1                  Patients insurance company: [employer] (Medicare [employer]vantage and [employer])

## 2021-08-19 ENCOUNTER — OFFICE VISIT (OUTPATIENT)
Dept: INTERNAL MEDICINE CLINIC | Facility: CLINIC | Age: 76
End: 2021-08-19
Payer: COMMERCIAL

## 2021-08-19 VITALS
DIASTOLIC BLOOD PRESSURE: 78 MMHG | WEIGHT: 189.8 LBS | HEART RATE: 58 BPM | OXYGEN SATURATION: 98 % | BODY MASS INDEX: 29.79 KG/M2 | SYSTOLIC BLOOD PRESSURE: 128 MMHG | HEIGHT: 67 IN

## 2021-08-19 DIAGNOSIS — J61 ASBESTOSIS (HCC): ICD-10-CM

## 2021-08-19 DIAGNOSIS — R20.2 FACIAL PARESTHESIA: Primary | ICD-10-CM

## 2021-08-19 PROCEDURE — 99213 OFFICE O/P EST LOW 20 MIN: CPT | Performed by: INTERNAL MEDICINE

## 2021-08-19 PROCEDURE — 1160F RVW MEDS BY RX/DR IN RCRD: CPT | Performed by: INTERNAL MEDICINE

## 2021-08-19 PROCEDURE — 3725F SCREEN DEPRESSION PERFORMED: CPT | Performed by: INTERNAL MEDICINE

## 2021-08-19 PROCEDURE — 1170F FXNL STATUS ASSESSED: CPT | Performed by: INTERNAL MEDICINE

## 2021-08-19 PROCEDURE — 3288F FALL RISK ASSESSMENT DOCD: CPT | Performed by: INTERNAL MEDICINE

## 2021-08-19 PROCEDURE — 1125F AMNT PAIN NOTED PAIN PRSNT: CPT | Performed by: INTERNAL MEDICINE

## 2021-08-19 NOTE — PROGRESS NOTES
Assessment/Plan:       There are no diagnoses linked to this encounter  Subjective:      Patient ID: Nahomy Aguila is a 67 y o  male  Left facial paresthesia   A 67year-old reports that for severa georgina he has had intermittent change in sensation left lower face  He does not have anesthesia  When he touches his face he can feel it and it feels the same both right left  He does not have hypoesthesia  He does not have hyperesthesia  However, he has a sensation of something unusual which he notes spontaneously  This occurs intermittently  It tends to occur more when he is seated  It sometimes can be aggravated by twisting his neck to the left  So, it appears that he has a peripheral paresthesia possibly related to some kind of nerve impingement of superficial nerves  Possibly the 7th nerve although those no motor component all  Multi vitamin B therapy did not help this at all  Patient states that this is more noticeable when his left carpal tunnel is acting out also      Also describes now associated sensation of a tight band around his head  No facial droop     No dysarthria     No change in hearing     Currently seeing a chiropractor for treatment for muscle spasm  Acute bicipital rupture be currently being treated by orthopedics   Spinal stenosis currently taking hydrocodone with good relief  The following portions of the patient's history were reviewed and updated as appropriate:   He has a past medical history of Abnormal EKG, Asbestosis ([address] ), History of sinus bradycardia, Hyperlipidemia, Prostate disorder, Shingles, and Spinal stenosis  ,  does not have any pertinent problems on file  ,   has a past surgical history that includes Appendectomy; Tonsillectomy; Neuroplasty / transposition median nerve at carpal tunnel; and Quadriceps tendon repair (Right)  ,  family history includes Colon cancer in his father; Diabetes in his father; Heart disease in his father; Hypertension in his father; Stroke in his father; Transient ischemic attack in his father  ,   reports that he has been smoking cigarettes  He has smoked for the past 50 00 years  He has never used smokeless tobacco  He reports current alcohol use of about 1 0 standard drinks of alcohol per week  He reports that he does not use drugs  ,  is allergic to corticosteroids and lisinopril     Current Outpatient Medications   Medication Sig Dispense Refill    aspirin (ECOTRIN LOW STRENGTH) 81 mg EC tablet Take 1 tablet by mouth daily      atorvastatin (LIPITOR) 10 mg tablet Take 1 tablet (10 mg total) by mouth daily 90 tablet 3    fluticasone (FLONASE) 50 mcg/act nasal spray PLACE 1 SPRAY INTO EACH NOSTRIL DAILY 48 mL 1    HYDROcodone-acetaminophen (NORCO) 5-325 mg per tablet Take 2 tablets by mouth every 6 (six) hours as needed for painMax Daily Amount: 8 tablets 100 tablet 0    omeprazole (PriLOSEC) 20 mg delayed release capsule Take 1 capsule (20 mg total) by mouth daily (Patient taking differently: Take 20 mg by mouth as needed ) 90 capsule 1    amoxicillin (AMOXIL) 500 mg capsule amoxicillin 500 mg capsule   TAKE 1 CAPSULE BY MOUTH 4 TIMES A DAY (Patient not taking: Reported on 8/19/2021)      doxycycline hyclate (VIBRAMYCIN) 100 mg capsule doxycycline hyclate 100 mg capsule   TAKE 2 CAPSULES RIGHT AWAY FOR A TICK BITE (Patient not taking: Reported on 8/19/2021)       No current facility-administered medications for this visit  Review of Systems   Musculoskeletal: Positive for arthralgias and back pain  Neurological: Positive for numbness and headaches  Paresthesia   All other systems reviewed and are negative  Objective:  Vitals:    08/19/21 1350   BP: 128/78   Pulse: 58   SpO2: 98%      Physical Exam  Cardiovascular:      Rate and Rhythm: Normal rate     Pulmonary:      Effort: Pulmonary effort is normal    Musculoskeletal:      Comments:   No facial paresis   Neurological:      General: No focal deficit present  Mental Status: He is alert  Psychiatric:         Mood and Affect: Mood normal            There are no Patient Instructions on file for this visit

## 2021-08-19 NOTE — PATIENT INSTRUCTIONS
Left facial paresthesia   Hat band type headache also describe  Brain imaging to ensure there is no aneurysm  Follow-up after that

## 2021-08-19 NOTE — PROGRESS NOTES
Assessment and Plan:     Problem List Items Addressed This Visit     None        BMI Counseling: Body mass index is 29 73 kg/m²  The BMI is above normal  Nutrition recommendations include decreasing portion sizes and moderation in carbohydrate intake  Exercise recommendations include moderate physical activity 150 minutes/week  Preventive health issues were discussed with patient, and age appropriate screening tests were ordered as noted in patient's After Visit Summary  Personalized health advice and appropriate referrals for health education or preventive services given if needed, as noted in patient's After Visit Summary  History of Present Illness:     Patient presents for Medicare Annual Wellness visit    Patient Care Team:  Nyasia Guo MD as PCP - General  MD Alyse Kim MD     Problem List:     Patient Active Problem List   Diagnosis    Essential hypertension    History of sinus bradycardia    Hyperlipidemia    Asbestosis ([address] )    Cervicalgia    Non-seasonal allergic rhinitis    Colon cancer screening    Prostate disorder    Chronic pain of both shoulders    Cervical spinal stenosis    Tubular adenoma of colon    Anterior cervical adenopathy    Superficial folliculitis      Past Medical and Surgical History:     Past Medical History:   Diagnosis Date    Abnormal EKG     Asbestosis ([address] )     L A  7/23/15   R    7/23/15      History of sinus bradycardia     Hyperlipidemia     Prostate disorder     Shingles     Spinal stenosis      Past Surgical History:   Procedure Laterality Date    APPENDECTOMY      NEUROPLASTY / TRANSPOSITION MEDIAN NERVE AT CARPAL TUNNEL      Decompression     QUADRICEPS TENDON REPAIR Right     TONSILLECTOMY        Family History:     Family History   Problem Relation Age of Onset    Transient ischemic attack Father     Colon cancer Father     Heart disease Father         cardiac disorder    Stroke Father     Diabetes Father     Hypertension Father       Social History:     Social History     Socioeconomic History    Marital status: /Civil Union     Spouse name: None    Number of children: None    Years of education: None    Highest education level: None   Occupational History    None   Tobacco Use    Smoking status: Current Some Day Smoker     Years: 50 00     Types: Cigarettes    Smokeless tobacco: Never Used    Tobacco comment: smokes on and off and quits on and off  Vaping Use    Vaping Use: Never used   Substance and Sexual Activity    Alcohol use: Yes     Alcohol/week: 1 0 standard drinks     Types: 1 Cans of beer per week     Comment: socially    Drug use: No    Sexual activity: Yes     Partners: Female   Other Topics Concern    None   Social History Narrative    Active advance directive     Social Determinants of Health     Financial Resource Strain:     Difficulty of Paying Living Expenses:    Food Insecurity:     Worried About Running Out of Food in the Last Year:     Ran Out of Food in the Last Year:    Transportation Needs:     Lack of Transportation (Medical):      Lack of Transportation (Non-Medical):    Physical Activity: Sufficiently Active    Days of Exercise per Week: 7 days    Minutes of Exercise per Session: 40 min   Stress: No Stress Concern Present    Feeling of Stress : Not at all   Social Connections:     Frequency of Communication with Friends and Family:     Frequency of Social Gatherings with Friends and Family:     Attends Restoration Services:     Active Member of Clubs or Organizations:     Attends Club or Organization Meetings:     Marital Status:    Intimate Partner Violence:     Fear of Current or Ex-Partner:     Emotionally Abused:     Physically Abused:     Sexually Abused:       Medications and Allergies:     Current Outpatient Medications   Medication Sig Dispense Refill    aspirin (ECOTRIN LOW STRENGTH) 81 mg EC tablet Take 1 tablet by mouth daily      atorvastatin (LIPITOR) 10 mg tablet Take 1 tablet (10 mg total) by mouth daily 90 tablet 3    fluticasone (FLONASE) 50 mcg/act nasal spray PLACE 1 SPRAY INTO EACH NOSTRIL DAILY 48 mL 1    HYDROcodone-acetaminophen (NORCO) 5-325 mg per tablet Take 2 tablets by mouth every 6 (six) hours as needed for painMax Daily Amount: 8 tablets 100 tablet 0    omeprazole (PriLOSEC) 20 mg delayed release capsule Take 1 capsule (20 mg total) by mouth daily (Patient taking differently: Take 20 mg by mouth as needed ) 90 capsule 1    amoxicillin (AMOXIL) 500 mg capsule amoxicillin 500 mg capsule   TAKE 1 CAPSULE BY MOUTH 4 TIMES A DAY (Patient not taking: Reported on 8/19/2021)      doxycycline hyclate (VIBRAMYCIN) 100 mg capsule doxycycline hyclate 100 mg capsule   TAKE 2 CAPSULES RIGHT AWAY FOR A TICK BITE (Patient not taking: Reported on 8/19/2021)       No current facility-administered medications for this visit  Allergies   Allergen Reactions    Corticosteroids     Lisinopril Tongue Swelling      Immunizations:     Immunization History   Administered Date(s) Administered    Influenza Split High Dose Preservative Free IM 1944, 11/26/2016    Influenza, high dose seasonal 0 7 mL 11/24/2020    Pneumococcal Conjugate 13-Valent 11/26/2016    Pneumococcal Polysaccharide PPV23 05/10/2017    SARS-CoV-2 / COVID-19 mRNA IM ([place]) 02/05/2021, 03/03/2021      Health Maintenance:         Topic Date Due    Hepatitis C Screening  Never done         Topic Date Due    DTaP,Tdap,and Td Vaccines (1 - Tdap) Never done    Influenza Vaccine (1) 09/01/2021      Medicare Health Risk Assessment:     Ht 5' 7" (1 702 m)   Wt 86 1 kg (189 lb 12 8 oz)   BMI 29 73 kg/m²      Lion Carter is here for his Subsequent Wellness visit  Last Medicare Wellness visit information reviewed, patient interviewed and updates made to the record today  Health Risk Assessment:   Patient rates overall health as good   Patient feels that their physical health rating is same  Patient is satisfied with their life  Eyesight was rated as same  Hearing was rated as same  Patient feels that their emotional and mental health rating is same  Patients states they are never, rarely angry  Patient states they are never, rarely unusually tired/fatigued  Pain experienced in the last 7 days has been none  Patient states that he has experienced no weight loss or gain in last 6 months  Depression Screening:   PHQ-2 Score: 0      Fall Risk Screening: In the past year, patient has experienced: history of falling in past year    Number of falls: 1  Injured during fall?: No    Feels unsteady when standing or walking?: Yes    Worried about falling?: No      Home Safety:  Patient does not have trouble with stairs inside or outside of their home  Patient has working smoke alarms and has working carbon monoxide detector  Home safety hazards include: none  Medications:   Patient is currently taking over-the-counter supplements  OTC medications include: see medication list  Patient is able to manage medications  Activities of Daily Living (ADLs)/Instrumental Activities of Daily Living (IADLs):   Walk and transfer into and out of bed and chair?: Yes  Dress and groom yourself?: Yes    Bathe or shower yourself?: Yes    Feed yourself?  Yes  Do your laundry/housekeeping?: Yes  Manage your money, pay your bills and track your expenses?: Yes  Make your own meals?: Yes    Do your own shopping?: Yes    Previous Hospitalizations:   Any hospitalizations or ED visits within the last 12 months?: No      Advance Care Planning:   Living will: Yes    Advanced directive: Yes      PREVENTIVE SCREENINGS      Cardiovascular Screening:    General: Screening Not Indicated and History Lipid Disorder      Diabetes Screening:     General: Screening Current      Colorectal Cancer Screening:     General: Screening Current      Prostate Cancer Screening:    General: Screening Not Indicated Osteoporosis Screening:    General: Risks and Benefits Discussed      Abdominal Aortic Aneurysm (AAA) Screening:    Risk factors include: tobacco use        Lung Cancer Screening:     General: Screening Not Indicated      Hepatitis C Screening:    General: Risks and Benefits Discussed    Hep C Screening Accepted: Yes      Review of Current Opioid Use    Opioid Risk Tool (ORT) Interpretation: Complete Opioid Risk Tool (ORT)      Cas Henderson MD

## 2021-09-04 ENCOUNTER — HOSPITAL ENCOUNTER (OUTPATIENT)
Dept: MRI IMAGING | Facility: HOSPITAL | Age: 76
Discharge: HOME | End: 2021-09-04
Attending: INTERNAL MEDICINE
Payer: COMMERCIAL

## 2021-09-04 DIAGNOSIS — R20.2 FACIAL PARESTHESIA: ICD-10-CM

## 2021-09-04 PROCEDURE — 70551 MRI BRAIN STEM W/O DYE: CPT

## 2021-09-04 PROCEDURE — G1004 CDSM NDSC: HCPCS

## 2021-09-10 ENCOUNTER — TELEPHONE (OUTPATIENT)
Dept: INTERNAL MEDICINE CLINIC | Facility: CLINIC | Age: 76
End: 2021-09-10

## 2021-09-29 DIAGNOSIS — M48.02 CERVICAL SPINAL STENOSIS: ICD-10-CM

## 2021-09-30 RX ORDER — HYDROCODONE BITARTRATE AND ACETAMINOPHEN 5; 325 MG/1; MG/1
2 TABLET ORAL EVERY 6 HOURS PRN
Qty: 100 TABLET | Refills: 0 | Status: SHIPPED | OUTPATIENT
Start: 2021-09-30 | End: 2021-12-08 | Stop reason: SDUPTHER

## 2021-11-13 DIAGNOSIS — E78.00 HIGH CHOLESTEROL: ICD-10-CM

## 2021-11-14 RX ORDER — ATORVASTATIN CALCIUM 10 MG/1
TABLET, FILM COATED ORAL
Qty: 90 TABLET | Refills: 3 | Status: SHIPPED | OUTPATIENT
Start: 2021-11-14 | End: 2022-07-06 | Stop reason: SDUPTHER

## 2022-01-19 ENCOUNTER — OFFICE VISIT (OUTPATIENT)
Dept: CARDIOLOGY CLINIC | Facility: CLINIC | Age: 77
End: 2022-01-19
Payer: COMMERCIAL

## 2022-01-19 ENCOUNTER — RA CDI HCC (OUTPATIENT)
Dept: OTHER | Facility: HOSPITAL | Age: 77
End: 2022-01-19

## 2022-01-19 VITALS
BODY MASS INDEX: 29.19 KG/M2 | HEART RATE: 51 BPM | DIASTOLIC BLOOD PRESSURE: 80 MMHG | WEIGHT: 186 LBS | SYSTOLIC BLOOD PRESSURE: 150 MMHG | HEIGHT: 67 IN

## 2022-01-19 DIAGNOSIS — R00.2 PALPITATION: Primary | ICD-10-CM

## 2022-01-19 DIAGNOSIS — R09.89 LEFT CAROTID BRUIT: ICD-10-CM

## 2022-01-19 DIAGNOSIS — Z86.79 HISTORY OF SINUS BRADYCARDIA: ICD-10-CM

## 2022-01-19 DIAGNOSIS — I10 ESSENTIAL HYPERTENSION: ICD-10-CM

## 2022-01-19 PROCEDURE — 93000 ELECTROCARDIOGRAM COMPLETE: CPT | Performed by: INTERNAL MEDICINE

## 2022-01-19 PROCEDURE — 99204 OFFICE O/P NEW MOD 45 MIN: CPT | Performed by: INTERNAL MEDICINE

## 2022-01-19 NOTE — PROGRESS NOTES
Patient ID: Graeme Chamberlain is a 67 y o  male  Plan:      Left carotid bruit  Will check a duplex  Essential hypertension  High today but well controlled at home  History of sinus bradycardia  Longstanding but no symptoms  I had him do 10 jumping jacks in the office and the heart rate easily went up to 80 beats per minute  Follow up Plan/Other summary comments:  No only change I made today was discontinuing aspirin  This assumes that the carotid duplex exam does not reveal significant disease  I told the patient that I would remain available for follow-up but that routine follow-up with me at this point is not needed  HPI:  Patient came in today to establish care  He had been seeing a cardiologist for years  That physician has retired  At some point a cardiologist put him on what sounds like a beta-blocker for hypertension but resulting heart rate was quite low and this was discontinued  Often blood pressure is high in doctor's offices but he brought me in a log of home blood pressures and they are all terrific  There is no history of chest pain or chest pressure  He remains physically active  No syncope or near syncope  Very rare palpitations are present  Results for orders placed or performed in visit on 01/19/22   POCT ECG    Impression    Sinus bradycardia  Otherwise WNL  Most recent or relevant cardiac/vascular testing:    TTE 8/29/2017: WNL      Past Surgical History:   Procedure Laterality Date    APPENDECTOMY      NEUROPLASTY / TRANSPOSITION MEDIAN NERVE AT CARPAL TUNNEL      Decompression     PATELLAR TENDON REPAIR  2019    QUADRICEPS TENDON REPAIR Right     TONSILLECTOMY       CMP:   Lab Results   Component Value Date    K 4 6 01/19/2021     01/19/2021    CO2 30 01/19/2021    BUN 20 01/19/2021    CREATININE 1 32 (H) 01/19/2021    EGFR 52 01/19/2021       Lipid Profile:   Lab Results   Component Value Date    TRIG 77 01/19/2021    HDL 44 01/19/2021 Review of Systems   10  point ROS  was otherwise non pertinent or negative except as per HPI or as below  Gait: Normal   Lots of aches and pains in joints but he pushes through all of them  Objective:     /80   Pulse (!) 51   Ht 5' 7" (1 702 m)   Wt 84 4 kg (186 lb)   BMI 29 13 kg/m²     PHYSICAL EXAM:    General:  Normal appearance in no distress  Eyes:  Anicteric  Oral mucosa:  Moist   Neck:  No JVD  Carotid upstrokes are brisk but there is a left carotid bruit  No masses  Chest:  Clear to auscultation  Cardiac:  No palpable PMI  Normal S1 and S2  No murmur gallop or rub  Abdomen:  Soft and nontender  No palpable organomegaly or aortic enlargement  Extremities:  No peripheral edema  Musculoskeletal:  Symmetric  Vascular:  Femoral pulses are brisk without bruits  Popliteal pulses are intact bilaterally  Pedal pulses are intact  Neuro:  Grossly symmetric  Psych:  Alert and oriented x3          Current Outpatient Medications:     atorvastatin (LIPITOR) 10 mg tablet, TAKE 1 TABLET BY MOUTH EVERY DAY, Disp: 90 tablet, Rfl: 3    fluticasone (FLONASE) 50 mcg/act nasal spray, 1 spray into each nostril daily, Disp: 48 mL, Rfl: 0    HYDROcodone-acetaminophen (NORCO) 5-325 mg per tablet, Take 2 tablets by mouth every 6 (six) hours as needed for pain Max Daily Amount: 8 tablets, Disp: 100 tablet, Rfl: 0    omeprazole (PriLOSEC) 20 mg delayed release capsule, Take 1 capsule (20 mg total) by mouth daily (Patient taking differently: Take 20 mg by mouth as needed ), Disp: 90 capsule, Rfl: 1    amoxicillin (AMOXIL) 500 mg capsule, amoxicillin 500 mg capsule  TAKE 1 CAPSULE BY MOUTH 4 TIMES A DAY (Patient not taking: Reported on 8/19/2021), Disp: , Rfl:     doxycycline hyclate (VIBRAMYCIN) 100 mg capsule, doxycycline hyclate 100 mg capsule  TAKE 2 CAPSULES RIGHT AWAY FOR A TICK BITE (Patient not taking: Reported on 8/19/2021), Disp: , Rfl:   Allergies   Allergen Reactions    Corticosteroids     Lisinopril Tongue Swelling     Past Medical History:   Diagnosis Date    Abnormal EKG     Asbestosis ([address] )     L A  7/23/15   R    7/23/15      Bradycardia     History of sinus bradycardia     Hyperlipidemia     Hypertension     Prostate disorder     Shingles     Spinal stenosis            Social History     Tobacco Use   Smoking Status Current Some Day Smoker    Years: 50 00    Types: Cigarettes   Smokeless Tobacco Never Used

## 2022-01-19 NOTE — PROGRESS NOTES
[facility]  coding opportunities       Chart reviewed, no opportunity found: CHART REVIEWED, NO OPPORTUNITY FOUND                        Patients insurance company: [facility] [address]  (Medicare Advantage and Commercial)

## 2022-01-19 NOTE — ASSESSMENT & PLAN NOTE
Longstanding but no symptoms  I had him do 10 jumping jacks in the office and the heart rate easily went up to 80 beats per minute

## 2022-01-20 ENCOUNTER — OFFICE VISIT (OUTPATIENT)
Dept: INTERNAL MEDICINE CLINIC | Facility: CLINIC | Age: 77
End: 2022-01-20
Payer: COMMERCIAL

## 2022-01-20 VITALS
TEMPERATURE: 97.7 F | DIASTOLIC BLOOD PRESSURE: 76 MMHG | BODY MASS INDEX: 29.63 KG/M2 | RESPIRATION RATE: 18 BRPM | HEIGHT: 67 IN | OXYGEN SATURATION: 96 % | HEART RATE: 58 BPM | WEIGHT: 188.8 LBS | SYSTOLIC BLOOD PRESSURE: 132 MMHG

## 2022-01-20 DIAGNOSIS — Z11.59 NEED FOR HEPATITIS C SCREENING TEST: ICD-10-CM

## 2022-01-20 DIAGNOSIS — Z23 ENCOUNTER FOR IMMUNIZATION: ICD-10-CM

## 2022-01-20 DIAGNOSIS — R01.1 HEART MURMUR: ICD-10-CM

## 2022-01-20 DIAGNOSIS — N18.30 STAGE 3 CHRONIC KIDNEY DISEASE, UNSPECIFIED WHETHER STAGE 3A OR 3B CKD (HCC): Primary | ICD-10-CM

## 2022-01-20 PROCEDURE — G0439 PPPS, SUBSEQ VISIT: HCPCS | Performed by: INTERNAL MEDICINE

## 2022-01-20 PROCEDURE — 1003F LEVEL OF ACTIVITY ASSESS: CPT | Performed by: INTERNAL MEDICINE

## 2022-01-20 PROCEDURE — 4004F PT TOBACCO SCREEN RCVD TLK: CPT | Performed by: INTERNAL MEDICINE

## 2022-01-20 PROCEDURE — 3288F FALL RISK ASSESSMENT DOCD: CPT | Performed by: INTERNAL MEDICINE

## 2022-01-20 PROCEDURE — 1160F RVW MEDS BY RX/DR IN RCRD: CPT | Performed by: INTERNAL MEDICINE

## 2022-01-20 PROCEDURE — 1170F FXNL STATUS ASSESSED: CPT | Performed by: INTERNAL MEDICINE

## 2022-01-20 PROCEDURE — 3725F SCREEN DEPRESSION PERFORMED: CPT | Performed by: INTERNAL MEDICINE

## 2022-01-20 PROCEDURE — 1125F AMNT PAIN NOTED PAIN PRSNT: CPT | Performed by: INTERNAL MEDICINE

## 2022-01-20 PROCEDURE — 99213 OFFICE O/P EST LOW 20 MIN: CPT | Performed by: INTERNAL MEDICINE

## 2022-01-20 NOTE — PROGRESS NOTES
Assessment/Plan:       Diagnoses and all orders for this visit:    Stage 3 chronic kidney disease, unspecified whether stage 3a or 3b CKD (HCC)  -     CBC and differential; Future  -     Lipid Panel with Direct LDL reflex; Future  -     PSA, Total Screen; Future  -     Comprehensive metabolic panel; Future  -     TSH, 3rd generation with Free T4 reflex; Future  -     UA (URINE) with reflex to Scope    Need for hepatitis C screening test  -     Hepatitis C Antibody ([facility]); Future    Encounter for immunization    Heart murmur  -     Echo complete w/ contrast if indicated; Future                Subjective:      Patient ID: Yoon Liz is a 67 y o  male  Lumbar pain with bilateral neurogenic claudication and some weakness  This is not severe        Blood pressure is treated   Hyperlipidemia is treated  He has BPH diagnosis but his PSAs have been low and he has minimal urinary symptoms  Asbestos exposure but no symptoms    Occasionally gets intertrigo in the groin  Left facial paresthesia   Almost a year now he reports intermittent change in sensation left lower face  He does not have anesthesia  When he touches his face he can feel it and it feels the same both right left  He does not have hypoesthesia  He does not have hyperesthesia  However, he has a sensation of something unusual which he notes spontaneously  This occurs intermittently  It tends to occur more when he is seated  It sometimes can be aggravated by twisting his neck to the left  So, it appears that he has a peripheral paresthesia possibly related to some kind of nerve impingement of superficial nerves  Possibly the 7th nerve although those no motor component all  Multi vitamin B therapy did not help this at all  Patient states that this is more noticeable when his left carpal tunnel is acting out also    Also describes now associated sensation of a tight band around his head    No facial droop   No dysarthria   No change in hearing       Chronic bicipital rupture right side  Is quite visible but he has no functional deficit  Spinal stenosis currently taking hydrocodone with good relief  Smokes a 3rd to half a pack a day  Refuses to do the  CT screen  The following portions of the patient's history were reviewed and updated as appropriate:   He has a past medical history of Abnormal EKG, Asbestosis ([address] ), Bradycardia, History of sinus bradycardia, Hyperlipidemia, Hypertension, Prostate disorder, Shingles, and Spinal stenosis  ,  does not have any pertinent problems on file  ,   has a past surgical history that includes Appendectomy; Tonsillectomy; Neuroplasty / transposition median nerve at carpal tunnel; Quadriceps tendon repair (Right); and Patellar tendon repair (2019)  ,  family history includes Colon cancer in his father; Diabetes in his father; Heart disease in his father; Hypertension in his father; Stroke in his father; Transient ischemic attack in his father  ,   reports that he has been smoking cigarettes  He has smoked for the past 50 00 years  He has never used smokeless tobacco  He reports previous alcohol use  He reports that he does not use drugs  ,  is allergic to corticosteroids and lisinopril     Current Outpatient Medications   Medication Sig Dispense Refill    atorvastatin (LIPITOR) 10 mg tablet TAKE 1 TABLET BY MOUTH EVERY DAY 90 tablet 3    fluticasone (FLONASE) 50 mcg/act nasal spray 1 spray into each nostril daily 48 mL 0    HYDROcodone-acetaminophen (NORCO) 5-325 mg per tablet Take 2 tablets by mouth every 6 (six) hours as needed for pain Max Daily Amount: 8 tablets 100 tablet 0    omeprazole (PriLOSEC) 20 mg delayed release capsule Take 1 capsule (20 mg total) by mouth daily (Patient taking differently: Take 20 mg by mouth as needed ) 90 capsule 1     No current facility-administered medications for this visit         Review of Systems   Musculoskeletal: Positive for arthralgias and back pain  Neurological: Positive for numbness  All other systems reviewed and are negative  Objective:  Vitals:    01/20/22 1713   BP: 132/76   Pulse: 58   Resp: 18   Temp: 97 7 °F (36 5 °C)   SpO2: 96%      Physical Exam  Constitutional:       Appearance: Normal appearance  He is not ill-appearing or diaphoretic  Neck:      Vascular: Carotid bruit present  Comments:   Bilateral low-pitched "bruit "heard in the neck  Right a bit greater than left  There is a heart murmur heard best right sternal border intercostal space 2  and I suspected bruits may be radiation  Cardiovascular:      Rate and Rhythm: Normal rate and regular rhythm  Heart sounds: Murmur heard  Pulmonary:      Effort: Pulmonary effort is normal       Breath sounds: Normal breath sounds  Abdominal:      General: Abdomen is flat  Neurological:      General: No focal deficit present  Mental Status: He is alert  Mental status is at baseline  Psychiatric:         Mood and Affect: Mood normal            Patient Instructions    A patient who looks and feels well  Wellness parameters are updated  Should do carotid ultrasound and the echo   Should do the CT screen and should stop smoking but he wound  Follow-up with me in a year or as needed

## 2022-01-20 NOTE — PATIENT INSTRUCTIONS
A patient who looks and feels well  Wellness parameters are updated  Should do carotid ultrasound and the echo   Should do the CT screen and should stop smoking but he wound  Follow-up with me in a year or as needed

## 2022-01-20 NOTE — PROGRESS NOTES
Assessment and Plan:     Problem List Items Addressed This Visit     None      Visit Diagnoses     Need for hepatitis C screening test        Encounter for immunization            BMI Counseling: Body mass index is 29 57 kg/m²  The BMI is above normal  Nutrition recommendations include decreasing portion sizes and moderation in carbohydrate intake  Exercise recommendations include moderate physical activity 150 minutes/week  Rationale for BMI follow-up plan is due to patient being overweight or obese  Depression Screening and Follow-up Plan: Patient was screened for depression during today's encounter  They screened negative with a PHQ-2 score of 0  Preventive health issues were discussed with patient, and age appropriate screening tests were ordered as noted in patient's After Visit Summary  Personalized health advice and appropriate referrals for health education or preventive services given if needed, as noted in patient's After Visit Summary  History of Present Illness:     Patient presents for Medicare Annual Wellness visit    Patient Care Team:  Karen Patel MD as PCP - General  MD Caterina Londono MD     Problem List:     Patient Active Problem List   Diagnosis    Essential hypertension    History of sinus bradycardia    Hyperlipidemia    Asbestosis ([address] )    Cervicalgia    Non-seasonal allergic rhinitis    Colon cancer screening    Prostate disorder    Chronic pain of both shoulders    Cervical spinal stenosis    Tubular adenoma of colon    Anterior cervical adenopathy    Superficial folliculitis    Left carotid bruit      Past Medical and Surgical History:     Past Medical History:   Diagnosis Date    Abnormal EKG     Asbestosis ([address] )     L A  7/23/15   R    7/23/15      Bradycardia     History of sinus bradycardia     Hyperlipidemia     Hypertension     Prostate disorder     Shingles     Spinal stenosis      Past Surgical History:   Procedure Laterality Date    APPENDECTOMY      NEUROPLASTY / TRANSPOSITION MEDIAN NERVE AT CARPAL TUNNEL      Decompression     PATELLAR TENDON REPAIR  2019    QUADRICEPS TENDON REPAIR Right     TONSILLECTOMY        Family History:     Family History   Problem Relation Age of Onset    Transient ischemic attack Father     Colon cancer Father     Heart disease Father         cardiac disorder    Stroke Father     Diabetes Father     Hypertension Father       Social History:     Social History     Socioeconomic History    Marital status: /Civil Union     Spouse name: None    Number of children: None    Years of education: None    Highest education level: None   Occupational History    None   Tobacco Use    Smoking status: Current Some Day Smoker     Years: 50 00     Types: Cigarettes    Smokeless tobacco: Never Used   Vaping Use    Vaping Use: Never used   Substance and Sexual Activity    Alcohol use: Not Currently    Drug use: No    Sexual activity: Yes     Partners: Female   Other Topics Concern    None   Social History Narrative    Active advance directive     Social Determinants of Health     Financial Resource Strain: Not on file   Food Insecurity: Not on file   Transportation Needs: Not on file   Physical Activity: Sufficiently Active    Days of Exercise per Week: 4 days    Minutes of Exercise per Session: 40 min   Stress: Stress Concern Present    Feeling of Stress :  To some extent   Social Connections: Not on file   Intimate Partner Violence: Not on file   Housing Stability: Not on file      Medications and Allergies:     Current Outpatient Medications   Medication Sig Dispense Refill    atorvastatin (LIPITOR) 10 mg tablet TAKE 1 TABLET BY MOUTH EVERY DAY 90 tablet 3    fluticasone (FLONASE) 50 mcg/act nasal spray 1 spray into each nostril daily 48 mL 0    HYDROcodone-acetaminophen (NORCO) 5-325 mg per tablet Take 2 tablets by mouth every 6 (six) hours as needed for pain Max Daily Amount: 8 tablets 100 tablet 0    omeprazole (PriLOSEC) 20 mg delayed release capsule Take 1 capsule (20 mg total) by mouth daily (Patient taking differently: Take 20 mg by mouth as needed ) 90 capsule 1     No current facility-administered medications for this visit  Allergies   Allergen Reactions    Corticosteroids     Lisinopril Tongue Swelling      Immunizations:     Immunization History   Administered Date(s) Administered    COVID-19 MODERNA VACC 0 5 ML IM 02/05/2021, 03/03/2021    Influenza Split High Dose Preservative Free IM 1945, 11/26/2016    Influenza, high dose seasonal 0 7 mL 11/24/2020    Pneumococcal Conjugate 13-Valent 11/26/2016    Pneumococcal Polysaccharide PPV23 05/10/2017      Health Maintenance:         Topic Date Due    Hepatitis C Screening  Never done         Topic Date Due    DTaP,Tdap,and Td Vaccines (1 - Tdap) Never done    Influenza Vaccine (1) 09/01/2021    COVID-19 Vaccine (3 - Booster for Moderna series) 09/03/2021      Medicare Health Risk Assessment:     There were no vitals taken for this visit  Radha Santiago is here for his Subsequent Wellness visit  Last Medicare Wellness visit information reviewed, patient interviewed and updates made to the record today  Health Risk Assessment:   Patient rates overall health as good  Patient feels that their physical health rating is same  Patient is satisfied with their life  Eyesight was rated as same  Hearing was rated as same  Patient feels that their emotional and mental health rating is same  Patients states they are never, rarely angry  Patient states they are sometimes unusually tired/fatigued  Pain experienced in the last 7 days has been a lot  Patient's pain rating has been 8/10  Patient states that he has experienced no weight loss or gain in last 6 months  Depression Screening:   PHQ-2 Score: 0      Fall Risk Screening:    In the past year, patient has experienced: no history of falling in past year Home Safety:  Patient does not have trouble with stairs inside or outside of their home  Patient has working smoke alarms and has working carbon monoxide detector  Home safety hazards include: none  Nutrition:   Current diet is Regular  Medications:   Patient is not currently taking any over-the-counter supplements  Patient is able to manage medications  Activities of Daily Living (ADLs)/Instrumental Activities of Daily Living (IADLs):   Walk and transfer into and out of bed and chair?: Yes  Dress and groom yourself?: Yes    Bathe or shower yourself?: Yes    Feed yourself? Yes  Do your laundry/housekeeping?: Yes  Manage your money, pay your bills and track your expenses?: Yes  Make your own meals?: Yes    Do your own shopping?: Yes    Previous Hospitalizations:   Any hospitalizations or ED visits within the last 12 months?: No      Advance Care Planning:   Living will: Yes    Advanced directive: Yes      Cognitive Screening:   Provider or family/friend/caregiver concerned regarding cognition?: No    PREVENTIVE SCREENINGS      Cardiovascular Screening:    General: Screening Not Indicated and History Lipid Disorder      Prostate Cancer Screening:    General: Screening Not Indicated      Osteoporosis Screening:    General: Risks and Benefits Discussed      Abdominal Aortic Aneurysm (AAA) Screening:    Risk factors include: tobacco use        Lung Cancer Screening:     General: Screening Not Indicated      Hepatitis C Screening:      Hep C Screening Accepted: Yes      Screening, Brief Intervention, and Referral to Treatment (SBIRT)    Screening  Typical number of drinks in a day: 0  Typical number of drinks in a week: 0  Interpretation: Low risk drinking behavior      AUDIT-C Screenin) How often did you have a drink containing alcohol in the past year? never  2) How many drinks did you have on a typical day when you were drinking in the past year? 0  3) How often did you have 6 or more drinks on one occasion in the past year? never    AUDIT-C Score: 0  Interpretation: Score 0-3 (male): Negative screen for alcohol misuse    Single Item Drug Screening:  How often have you used an illegal drug (including marijuana) or a prescription medication for non-medical reasons in the past year? never    Single Item Drug Screen Score: 0  Interpretation: Negative screen for possible drug use disorder    Review of Current Opioid Use  Opioid Risk Tool (ORT) Score: 0  Opioid Risk Tool (ORT) Interpretation: Score 0-3: Low risk for opioid misuse      Dontae Lobo MD

## 2022-01-27 DIAGNOSIS — M48.02 CERVICAL SPINAL STENOSIS: ICD-10-CM

## 2022-01-28 RX ORDER — HYDROCODONE BITARTRATE AND ACETAMINOPHEN 5; 325 MG/1; MG/1
2 TABLET ORAL EVERY 6 HOURS PRN
Qty: 100 TABLET | Refills: 0 | Status: SHIPPED | OUTPATIENT
Start: 2022-01-28 | End: 2022-03-07 | Stop reason: SDUPTHER

## 2022-02-18 ENCOUNTER — HOSPITAL ENCOUNTER (OUTPATIENT)
Dept: NON INVASIVE DIAGNOSTICS | Facility: CLINIC | Age: 77
Discharge: HOME | End: 2022-02-18
Payer: COMMERCIAL

## 2022-02-18 VITALS
HEIGHT: 67 IN | WEIGHT: 188 LBS | DIASTOLIC BLOOD PRESSURE: 76 MMHG | SYSTOLIC BLOOD PRESSURE: 132 MMHG | HEART RATE: 52 BPM | BODY MASS INDEX: 29.51 KG/M2

## 2022-02-18 DIAGNOSIS — R09.89 LEFT CAROTID BRUIT: ICD-10-CM

## 2022-02-18 DIAGNOSIS — R01.1 HEART MURMUR: ICD-10-CM

## 2022-02-18 LAB
AORTIC ROOT: 3.3 CM
APICAL FOUR CHAMBER EJECTION FRACTION: 75 %
AV PEAK GRADIENT: 12 MMHG
E WAVE DECELERATION TIME: 274 MS
FRACTIONAL SHORTENING: 38 % (ref 28–44)
INTERVENTRICULAR SEPTUM IN DIASTOLE (PARASTERNAL SHORT AXIS VIEW): 0.9 CM (ref 0.54–1)
INTERVENTRICULAR SEPTUM: 0.9 CM (ref 0.6–1.1)
LAAS-AP4: 14.1 CM2
LEFT ATRIUM AREA SYSTOLE SINGLE PLANE A4C: 13.7 CM2
LEFT ATRIUM SIZE: 4.4 CM
LEFT INTERNAL DIMENSION IN SYSTOLE: 3.4 CM (ref 3–4.54)
LEFT VENTRICULAR INTERNAL DIMENSION IN DIASTOLE: 5.5 CM (ref 4.93–7.34)
LEFT VENTRICULAR POSTERIOR WALL IN END DIASTOLE: 0.9 CM (ref 0.52–0.99)
LEFT VENTRICULAR STROKE VOLUME: 98 ML
LVSV (TEICH): 98 ML
MV PEAK A VEL: 0.59 M/S
MV PEAK E VEL: 74 CM/S
MV STENOSIS PRESSURE HALF TIME: 79 MS
MV VALVE AREA P 1/2 METHOD: 2.78 CM2
RIGHT ATRIUM AREA SYSTOLE A4C: 17.6 CM2
RIGHT VENTRICLE ID DIMENSION: 3.7 CM
SL CV PED ECHO LEFT VENTRICLE DIASTOLIC VOLUME (MOD BIPLANE) 2D: 146 ML
SL CV PED ECHO LEFT VENTRICLE SYSTOLIC VOLUME (MOD BIPLANE) 2D: 48 ML
TR MAX PG: 4 MMHG
TR PEAK VELOCITY: 1 M/S
TRICUSPID VALVE PEAK REGURGITATION VELOCITY: 1.03 M/S
Z-SCORE OF INTERVENTRICULAR SEPTUM IN END DIASTOLE: 1.09
Z-SCORE OF LEFT VENTRICULAR DIMENSION IN END DIASTOLE: -0.9
Z-SCORE OF LEFT VENTRICULAR DIMENSION IN END SYSTOLE: -0.64
Z-SCORE OF LEFT VENTRICULAR POSTERIOR WALL IN END DIASTOLE: 1.2

## 2022-02-18 PROCEDURE — 93306 TTE W/DOPPLER COMPLETE: CPT

## 2022-02-18 PROCEDURE — 93306 TTE W/DOPPLER COMPLETE: CPT | Performed by: INTERNAL MEDICINE

## 2022-02-18 PROCEDURE — 93880 EXTRACRANIAL BILAT STUDY: CPT | Performed by: SURGERY

## 2022-02-18 PROCEDURE — 93880 EXTRACRANIAL BILAT STUDY: CPT

## 2022-03-07 DIAGNOSIS — M48.02 CERVICAL SPINAL STENOSIS: ICD-10-CM

## 2022-03-08 RX ORDER — HYDROCODONE BITARTRATE AND ACETAMINOPHEN 5; 325 MG/1; MG/1
2 TABLET ORAL EVERY 6 HOURS PRN
Qty: 100 TABLET | Refills: 0 | Status: SHIPPED | OUTPATIENT
Start: 2022-03-08 | End: 2022-04-07 | Stop reason: SDUPTHER

## 2022-04-04 DIAGNOSIS — M48.02 CERVICAL SPINAL STENOSIS: ICD-10-CM

## 2022-04-07 DIAGNOSIS — M48.02 CERVICAL SPINAL STENOSIS: ICD-10-CM

## 2022-04-07 RX ORDER — HYDROCODONE BITARTRATE AND ACETAMINOPHEN 5; 325 MG/1; MG/1
2 TABLET ORAL EVERY 6 HOURS PRN
Qty: 100 TABLET | Refills: 0 | OUTPATIENT
Start: 2022-04-07

## 2022-04-07 RX ORDER — HYDROCODONE BITARTRATE AND ACETAMINOPHEN 5; 325 MG/1; MG/1
2 TABLET ORAL EVERY 6 HOURS PRN
Qty: 100 TABLET | Refills: 0 | Status: SHIPPED | OUTPATIENT
Start: 2022-04-07 | End: 2022-05-16 | Stop reason: SDUPTHER

## 2022-05-04 ENCOUNTER — CONSULT (OUTPATIENT)
Dept: INTERNAL MEDICINE CLINIC | Facility: CLINIC | Age: 77
End: 2022-05-04
Payer: COMMERCIAL

## 2022-05-04 VITALS
WEIGHT: 189.6 LBS | BODY MASS INDEX: 29.76 KG/M2 | OXYGEN SATURATION: 98 % | HEART RATE: 60 BPM | HEIGHT: 67 IN | DIASTOLIC BLOOD PRESSURE: 88 MMHG | RESPIRATION RATE: 16 BRPM | SYSTOLIC BLOOD PRESSURE: 142 MMHG | TEMPERATURE: 98.4 F

## 2022-05-04 DIAGNOSIS — G56.02 LEFT CARPAL TUNNEL SYNDROME: ICD-10-CM

## 2022-05-04 DIAGNOSIS — I10 ESSENTIAL HYPERTENSION: Primary | ICD-10-CM

## 2022-05-04 DIAGNOSIS — N18.30 STAGE 3 CHRONIC KIDNEY DISEASE, UNSPECIFIED WHETHER STAGE 3A OR 3B CKD (HCC): ICD-10-CM

## 2022-05-04 DIAGNOSIS — Z86.79 HISTORY OF SINUS BRADYCARDIA: ICD-10-CM

## 2022-05-04 PROCEDURE — 4004F PT TOBACCO SCREEN RCVD TLK: CPT | Performed by: NURSE PRACTITIONER

## 2022-05-04 PROCEDURE — 99215 OFFICE O/P EST HI 40 MIN: CPT | Performed by: NURSE PRACTITIONER

## 2022-05-04 PROCEDURE — 3077F SYST BP >= 140 MM HG: CPT | Performed by: NURSE PRACTITIONER

## 2022-05-04 PROCEDURE — 3079F DIAST BP 80-89 MM HG: CPT | Performed by: NURSE PRACTITIONER

## 2022-05-04 RX ORDER — IBUPROFEN 600 MG/1
TABLET ORAL 3 TIMES DAILY
COMMUNITY
End: 2022-05-04 | Stop reason: ALTCHOICE

## 2022-05-04 RX ORDER — IBUPROFEN 600 MG/1
TABLET ORAL
COMMUNITY
Start: 2022-04-27 | End: 2022-05-04 | Stop reason: ALTCHOICE

## 2022-05-04 NOTE — ASSESSMENT & PLAN NOTE
The patient with a history of sinus bradycardia  He continues to follows closely with Cardiology  It is stable at this time  He is quite active and is able to walk up to 3 miles per day

## 2022-05-04 NOTE — PROGRESS NOTES
INTERNAL MEDICINE PRE-OPERATIVE EVALUATION  [facility]    NAME: Queen Kade Winter  AGE: 67 y o  SEX: male  : 1945     DATE: 2022     Internal Medicine Pre-Operative Evaluation:     Chief Complaint: Pre-operative Evaluation     Surgery: left carpal tunnel release  Anticipated Date of Surgery: 2022  Referring Provider: Bryon Dillon MD        History of Present Illness:     Naima Brooks is a 67 y o  male who presents to the office today for a preoperative consultation at the request of surgeon, Lauryn Romano MD who plans on performing left carpal tunnel release  on 2022  Planned anesthesia is general  Patient has a bleeding risk of: no recent abnormal bleeding  Current anti-platelet/anti-coagulation medications that the patient is prescribed includes: none  Assessment of Chronic Conditions:   1  Essential hypertension  Assessment & Plan:    The patient's blood pressure in the office today is 142/88  He had been on blood pressure medication in the past however this was discontinued due to bradycardia  The patient is quite anxious today due to his car not working properly  2  Stage 3 chronic kidney disease, unspecified whether stage 3a or 3b CKD [facility])  Assessment & Plan:  Lab Results   Component Value Date    EGFR 52 2021    EGFR 57 2020    EGFR 60 2018    CREATININE 1 32 (H) 2021    CREATININE 1 24 2020    CREATININE 1 20 2018     Kidney function stable  3  Left carpal tunnel syndrome  Assessment & Plan:    The patient is scheduled for left carpal tunnel release surgery next week      4  History of sinus bradycardia  Assessment & Plan:   The patient with a history of sinus bradycardia  He continues to follows closely with Cardiology  It is stable at this time  He is quite active and is able to walk up to 3 miles per day             Assessment of Cardiac Risk:  · Denies unstable or severe angina or MI in the last 6 weeks or history of stent placement in the last year   · Denies decompensated heart failure (e g  New onset heart failure, NYHA functional class IV heart failure, or worsening existing heart failure)  · Denies significant arrhythmias such as high grade AV block, symptomatic ventricular arrhythmia, newly recognized ventricular tachycardia, supraventricular tachycardia with resting heart rate >100, or symptomatic bradycardia  · Denies severe heart valve disease including aortic stenosis or symptomatic mitral stenosis     Exercise Capacity:  · Able to walk 4 blocks without symptoms?: Yes  · Able to walk 2 flights without symptoms?: Yes    Prior Anesthesia Reactions: No     Personal history of venous thromboembolic disease? No    History of steroid use for >2 weeks within last year? No            Review of Systems:     Review of Systems   Constitutional: Negative  Negative for fatigue  HENT: Negative  Negative for congestion, postnasal drip, rhinorrhea and trouble swallowing  Eyes: Negative  Negative for visual disturbance  Respiratory: Negative  Negative for choking and shortness of breath  Cardiovascular: Negative  Negative for chest pain  Gastrointestinal: Negative  Endocrine: Negative  Genitourinary: Negative  Musculoskeletal: Negative  Negative for arthralgias, back pain, myalgias and neck pain  Skin: Negative  Neurological: Negative for dizziness and headaches  Psychiatric/Behavioral: Negative           Problem List:     Patient Active Problem List   Diagnosis    Essential hypertension    History of sinus bradycardia    Hyperlipidemia    Asbestosis ([address]    Cervicalgia    Non-seasonal allergic rhinitis    Colon cancer screening    Prostate disorder    Chronic pain of both shoulders    Cervical spinal stenosis    Tubular adenoma of colon    Anterior cervical adenopathy    Superficial folliculitis    Left carotid bruit    Stage 3 chronic kidney disease, unspecified whether stage 3a or 3b CKD (HCC)        Allergies:     No Active Allergies     Current Medications:       Current Outpatient Medications:     atorvastatin (LIPITOR) 10 mg tablet, TAKE 1 TABLET BY MOUTH EVERY DAY, Disp: 90 tablet, Rfl: 3    fluticasone (FLONASE) 50 mcg/act nasal spray, 1 spray into each nostril daily, Disp: 48 mL, Rfl: 0    HYDROcodone-acetaminophen (NORCO) 5-325 mg per tablet, Take 2 tablets by mouth every 6 (six) hours as needed for pain Max Daily Amount: 8 tablets, Disp: 100 tablet, Rfl: 0    omeprazole (PriLOSEC) 20 mg delayed release capsule, Take 1 capsule (20 mg total) by mouth daily (Patient taking differently: Take 20 mg by mouth as needed ), Disp: 90 capsule, Rfl: 1     Past History:     Past Medical History:   Diagnosis Date    Abnormal EKG     [address] [address]     L A  7/23/15   R    7/23/15      Bradycardia     History of sinus bradycardia     Hyperlipidemia     Hypertension     Prostate disorder     Shingles     Spinal stenosis         Past Surgical History:   Procedure Laterality Date    APPENDECTOMY      NEUROPLASTY / TRANSPOSITION MEDIAN NERVE AT CARPAL TUNNEL      Decompression     PATELLAR TENDON REPAIR  2019    QUADRICEPS TENDON REPAIR Right     TONSILLECTOMY          Family History   Problem Relation Age of Onset    Transient ischemic attack Father     Colon cancer Father     Heart disease Father         cardiac disorder    Stroke Father     Diabetes Father     Hypertension Father         Social History     Socioeconomic History    Marital status: /Civil Union     Spouse name: Not on file    Number of children: Not on file    Years of education: Not on file    Highest education level: Not on file   Occupational History    Not on file   Tobacco Use    Smoking status: Current Some Day Smoker     Years: 50 00     Types: Cigarettes    Smokeless tobacco: Never Used   Vaping Use    Vaping Use: Never used Substance and Sexual Activity    Alcohol use: Not Currently    Drug use: No    Sexual activity: Yes     Partners: Female   Other Topics Concern    Not on file   Social History Narrative    Active advance directive     Social Determinants of Health     Financial Resource Strain: Not on file   Food Insecurity: Not on file   Transportation Needs: Not on file   Physical Activity: Sufficiently Active    Days of Exercise per Week: 4 days    Minutes of Exercise per Session: 40 min   Stress: Stress Concern Present    Feeling of Stress : To some extent   Social Connections: Not on file   Intimate Partner Violence: Not on file   Housing Stability: Not on file        Physical Exam:      /80 (BP Location: Left arm, Patient Position: Sitting, Cuff Size: Standard)   Pulse 60   Temp 98 4 °F (36 9 °C) (Tympanic)   Resp 16   Ht 5' 7" (1 702 m)   Wt 86 kg (189 lb 9 6 oz)   SpO2 98%   BMI 29 70 kg/m²     Physical Exam  Vitals reviewed  Constitutional:       General: He is not in acute distress  Appearance: Normal appearance  He is well-developed  HENT:      Head: Normocephalic and atraumatic  Right Ear: External ear normal       Left Ear: External ear normal       Nose: Nose normal       Mouth/Throat:      Pharynx: No oropharyngeal exudate  Eyes:      General:         Right eye: No discharge  Left eye: No discharge  Conjunctiva/sclera: Conjunctivae normal       Pupils: Pupils are equal, round, and reactive to light  Neck:      Thyroid: No thyromegaly  Vascular: No JVD  Trachea: No tracheal deviation  Cardiovascular:      Rate and Rhythm: Normal rate and regular rhythm  Heart sounds: Normal heart sounds  No murmur heard  Pulmonary:      Effort: Pulmonary effort is normal  No respiratory distress  Breath sounds: Normal breath sounds  No wheezing  Abdominal:      General: Bowel sounds are normal       Palpations: Abdomen is soft  Tenderness:  There is no abdominal tenderness  Musculoskeletal:         General: Normal range of motion  Cervical back: Normal range of motion and neck supple  Lymphadenopathy:      Cervical: No cervical adenopathy  Skin:     General: Skin is warm and dry  Capillary Refill: Capillary refill takes less than 2 seconds  Neurological:      Mental Status: He is alert and oriented to person, place, and time  Psychiatric:         Mood and Affect: Mood normal          Behavior: Behavior normal          Thought Content: Thought content normal          Judgment: Judgment normal            Data:     Pre-operative work-up    Laboratory Results: I have personally reviewed the pertinent laboratory results/reports     EKG: I have personally reviewed pertinent reports  4/27/2022  SINUS BRADYCARDIA   OTHERWISE NORMAL ECG   WHEN COMPARED WITH ECG OF 01-JUL-2019 09:46,   NO SIGNIFICANT CHANGE WAS FOUND     Chest x-ray: I have personally reviewed pertinent reports  4/27/2022  Monroe Murphy MD - 04/27/2022   Formatting of this note might be different from the original    Chest x-ray  INDICATION: Preoperative evaluation  COMPARISON: 6/28/2019  FINDINGS: Frontal and lateral views of the chest were obtained  There is no   consolidation, pneumothorax or pleural effusion  Bilateral pleural plaquing   redemonstrated  There is a normal cardiomediastinal silhouette  IMPRESSION:   IMPRESSION: Radiographically nonacute chest         Previous cardiopulmonary studies within the past year:  · Echocardiogram:  2/18/2022    Left Ventricle: Left ventricular cavity size is normal  Wall thickness is normal  Systolic function is normal (60%)  Wall motion is normal  Diastolic function is normal     Right Ventricle: Right ventricular cavity size is normal  Systolic function is normal     Aortic Valve: There is trace regurgitation    Mitral Valve: There is mild annular calcification  There is trace regurgitation     Tricuspid Valve: There is trace regurgitation  The right ventricular systolic pressure is normal     Pulmonic Valve: There is trace regurgitation           Plan:     67 y o  male with planned surgery: left carpal tunnel release  Revised Cardiac Risk Index for Pre-Operative Risk from [facility]  on 5/4/2022  ** All calculations should be rechecked by clinician prior to use **    RESULT SUMMARY:  0 points  Class I Risk    3 9 %  30-day risk of death, MI, or cardiac arrest    From [employer] 2017, based on pooled data from 5 high quality external validations (4 prospective)  These numbers are higher than those often quoted from the now-outdated original study ([place] [facility])  See Evidence for details  INPUTS:  Elevated-risk surgery --> 0 = No  History of ischemic heart disease --> 0 = No  History of congestive heart failure --> 0 = No  History of cerebrovascular disease --> 0 = No  Pre-operative treatment with insulin --> 0 = No  Pre-operative creatinine >2 mg/dL / 176 8 µmol/L --> 0 = No      1  Further preoperative workup as follows:   - None; no further preoperative work-up is required    2  Medication Management/Recommendations:   - None, continue medication regimen including morning of surgery, with sip of water  - Patient has been instructed to avoid herbs or non-directed vitamins the week prior to surgery to ensure no drug interactions with perioperative surgical and anesthetic medications  - Patient has been instructed to avoid aspirin containing medications or non-steroidal anti-inflammatory drugs for the week preceding surgery  3  Prophylaxis for cardiac events with perioperative beta-blockers: not indicated  4  Patient requires further consultation with: None    Clearance  Patient is CLEARED for surgery without any additional cardiac testing       Pennie Freeman [facility]  [address] [address]  Phone#  [phone]  Fax#  [phone]

## 2022-05-04 NOTE — PATIENT INSTRUCTIONS
Chronic Kidney Disease   AMBULATORY CARE:   Chronic kidney disease (CKD)  is the gradual and permanent loss of kidney function  Normally, the kidneys remove fluid, chemicals, and waste from your blood  These wastes are turned into urine by your kidneys  CKD may worsen over time and lead to kidney failure  Common signs and symptoms include the following:   · Changes in how often you need to urinate    · Swelling in your arms, legs, or feet    · Shortness of breath    · Fatigue or weakness    · Bad or bitter taste in your mouth    · Nausea, vomiting, or loss of appetite    Call your local emergency number (911 in the [address] [facility]) if:   · You have a seizure  · You have shortness of breath  Seek care immediately if:   · You are confused and very drowsy  Call your doctor or nephrologist if:   · You suddenly gain or lose more weight than your healthcare provider has told you is okay  · You have itchy skin or a rash  · You urinate more or less than you normally do  · You have blood in your urine  · You have nausea and are vomiting  · You have fatigue or muscle weakness  · You have hiccups that will not stop  · You have questions or concerns about your condition or care  How CKD is diagnosed:  CKD has 5 stages  Your healthcare provider will use results from the following tests to find the stage of CKD you have:  · Blood and urine tests  show how well your kidneys are working  They may also show the cause of your CKD  · Ultrasound, CT scan, or MRI  pictures may be used to check your kidneys  You may be given contrast liquid to help your kidneys show up better in the pictures  Tell the healthcare provider if you have ever had an allergic reaction to contrast liquid  Do not enter the MRI room with anything metal  Metal can cause serious injury  Tell the healthcare provider if you have any metal in or on your body      · A biopsy  is a procedure to remove a small piece of tissue from your kidney  It is done to find the cause of your CKD  Treatment  can help control signs and symptoms, and prevent a worse stage of CKD  Your care team may include specialists, such as a dietitian or a heart specialist  This depends on the stage of your CKD and if you have other health conditions to manage  Healthcare providers will work with you to create a plan based on your decisions for treatment  Your treatment plan may include any of the following:  · Medicines  may be given to decrease your blood pressure and get rid of extra fluid  You may also receive medicine to manage health conditions that may occur with CKD, such as anemia, diabetes, and heart disease  · Dialysis  is a treatment to remove chemicals and waste from your blood when your kidneys can no longer do this  · Surgery  may be needed to create an arteriovenous fistula (AVF) in your arm or insert a catheter into your abdomen  This is done so you can receive dialysis  · A kidney transplant  may be done if your CKD becomes severe  What you can do to manage CKD: Management may include making some lifestyle changes  Tell your healthcare provider if you have any concerns about being able to make changes  He or she can help you find solutions, including working with specialists  Ask for help creating a plan to break large goals into smaller steps  Your plan may include any of the following:  · Manage other health conditions  Your healthcare provider will work with you to make a care plan that meets your needs  You will be checked regularly for heart disease or other conditions that can make CKD worse, such as diabetes  Your blood pressure will be closely monitored  You will also get a target blood pressure and help making a plan to reach your target  This may include taking your blood pressure at home  · Maintain a healthy weight  Your weight and body mass index (BMI) will be checked regularly   BMI helps find if your weight is healthy for your height  Your healthcare provider will use other tests to check your muscle and protein levels  Extra weight can strain your kidneys  A low weight or low muscle mass can make you feel more tired  You may have trouble doing your daily activities  Ask your provider what a healthy weight is for you  He or she can help you create a plan to lose or gain weight safely, if needed  The plan may include keeping a food diary  This is a list of foods and liquids you have each day  Your provider will use the diary to help you make changes, if needed  Changes are based on your health and any other conditions you have, such as diabetes  · Create an exercise plan  Regular exercise can help you manage CKD, high blood pressure, and diabetes  Exercise also helps control weight  Your provider can help you create exercise goals and a plan to reach those goals  For example, your goal may be to exercise for 30 minutes in a day  Your plan can include breaking exercise into 10 minute sessions, 3 times during the day  · Create a healthy eating plan  Your provider may tell you to eat food low in potassium, phosphorus, or protein  Your provider may also recommend vitamin or mineral supplements  Do not take any supplements without talking to your provider  A dietitian can help you plan meals if needed  Ask how much liquid to drink each day and which liquids are best for you  · Limit sodium (salt) as directed  You may need to limit sodium to less than 2,300 milligrams (mg) each day  Ask your dietitian or healthcare provider how much sodium you can have each day  The amount depends on your stage of kidney disease  Table salt, canned foods, soups, salted snacks, and processed meats, like deli meats and sausage, are high in sodium  Your provider or a dietitian can show you how to read food labels for sodium  · Limit alcohol as directed  Alcohol can cause fluid retention and can affect your kidneys   Ask how much alcohol is safe for you  A drink of alcohol is 12 ounces of beer, 5 ounces of wine, or 1½ ounces of liquor  · Do not smoke  Nicotine and other chemicals in cigarettes and cigars can cause kidney damage  Ask your provider for information if you currently smoke and need help to quit  E-cigarettes or smokeless tobacco still contain nicotine  Talk to your provider before you use these products  · Ask about over-the-counter medicines  Medicines such as NSAIDs and laxatives may harm your kidneys  Some cough and cold medicines can raise your blood pressure  Always ask if a medicine is safe before you take it  · Ask about vaccines you may need  CKD can increase your risk for infections such as pneumonia, influenza, and hepatitis  Vaccines lower your risk for infection  Your healthcare provider will tell you which vaccines you need and when to get them  Follow up with your doctor or nephrologist as directed: You will need to return for tests to monitor your kidney and nerve function, and your parathyroid hormone level  Your medicines may be changed, based on certain test results  Write down your questions so you remember to ask them during your visits  © Copyright [employer] 2022 Information is for End User's use only and may not be sold, redistributed or otherwise used for commercial purposes  All illustrations and images included in CareNotes® are the copyrighted property of [employer]  or Jen Atwood  The above information is an  only  It is not intended as medical advice for individual conditions or treatments  Talk to your doctor, nurse or pharmacist before following any medical regimen to see if it is safe and effective for you

## 2022-05-04 NOTE — ASSESSMENT & PLAN NOTE
The patient's blood pressure in the office today is 142/88  He had been on blood pressure medication in the past however this was discontinued due to bradycardia  The patient is quite anxious today due to his car not working properly

## 2022-05-04 NOTE — ASSESSMENT & PLAN NOTE
Lab Results   Component Value Date    EGFR 52 01/19/2021    EGFR 57 05/19/2020    EGFR 60 02/20/2018    CREATININE 1 32 (H) 01/19/2021    CREATININE 1 24 05/19/2020    CREATININE 1 20 02/20/2018     Kidney function stable

## 2022-05-16 DIAGNOSIS — M48.02 CERVICAL SPINAL STENOSIS: ICD-10-CM

## 2022-05-17 RX ORDER — HYDROCODONE BITARTRATE AND ACETAMINOPHEN 5; 325 MG/1; MG/1
2 TABLET ORAL EVERY 6 HOURS PRN
Qty: 100 TABLET | Refills: 0 | Status: SHIPPED | OUTPATIENT
Start: 2022-05-17 | End: 2022-06-11 | Stop reason: SDUPTHER

## 2022-06-11 ENCOUNTER — OFFICE VISIT (OUTPATIENT)
Dept: INTERNAL MEDICINE CLINIC | Facility: CLINIC | Age: 77
End: 2022-06-11
Payer: COMMERCIAL

## 2022-06-11 ENCOUNTER — APPOINTMENT (OUTPATIENT)
Dept: LAB | Facility: CLINIC | Age: 77
End: 2022-06-11
Payer: COMMERCIAL

## 2022-06-11 VITALS
HEART RATE: 62 BPM | DIASTOLIC BLOOD PRESSURE: 70 MMHG | BODY MASS INDEX: 29.32 KG/M2 | OXYGEN SATURATION: 99 % | SYSTOLIC BLOOD PRESSURE: 126 MMHG | TEMPERATURE: 97.9 F | HEIGHT: 67 IN | WEIGHT: 186.8 LBS

## 2022-06-11 DIAGNOSIS — D12.6 TUBULAR ADENOMA OF COLON: ICD-10-CM

## 2022-06-11 DIAGNOSIS — N18.30 STAGE 3 CHRONIC KIDNEY DISEASE, UNSPECIFIED WHETHER STAGE 3A OR 3B CKD (HCC): ICD-10-CM

## 2022-06-11 DIAGNOSIS — N18.30 STAGE 3 CHRONIC KIDNEY DISEASE, UNSPECIFIED WHETHER STAGE 3A OR 3B CKD (HCC): Primary | ICD-10-CM

## 2022-06-11 DIAGNOSIS — F11.20 CONTINUOUS OPIOID DEPENDENCE (HCC): ICD-10-CM

## 2022-06-11 DIAGNOSIS — M48.02 CERVICAL SPINAL STENOSIS: ICD-10-CM

## 2022-06-11 PROBLEM — I65.23 BILATERAL CAROTID ARTERY STENOSIS: Status: ACTIVE | Noted: 2022-06-11

## 2022-06-11 LAB
ANION GAP SERPL CALCULATED.3IONS-SCNC: 1 MMOL/L (ref 4–13)
BILIRUB UR QL STRIP: NEGATIVE
BUN SERPL-MCNC: 16 MG/DL (ref 5–25)
CALCIUM SERPL-MCNC: 9.2 MG/DL (ref 8.3–10.1)
CHLORIDE SERPL-SCNC: 109 MMOL/L (ref 100–108)
CLARITY UR: CLEAR
CO2 SERPL-SCNC: 30 MMOL/L (ref 21–32)
COLOR UR: NORMAL
CREAT SERPL-MCNC: 1.19 MG/DL (ref 0.6–1.3)
GFR SERPL CREATININE-BSD FRML MDRD: 58 ML/MIN/1.73SQ M
GLUCOSE SERPL-MCNC: 96 MG/DL (ref 65–140)
GLUCOSE UR STRIP-MCNC: NEGATIVE MG/DL
HGB UR QL STRIP.AUTO: NEGATIVE
KETONES UR STRIP-MCNC: NEGATIVE MG/DL
LEUKOCYTE ESTERASE UR QL STRIP: NEGATIVE
NITRITE UR QL STRIP: NEGATIVE
PH UR STRIP.AUTO: 5.5 [PH]
POTASSIUM SERPL-SCNC: 4.4 MMOL/L (ref 3.5–5.3)
PROT UR STRIP-MCNC: NEGATIVE MG/DL
SODIUM SERPL-SCNC: 140 MMOL/L (ref 136–145)
SP GR UR STRIP.AUTO: 1.02 (ref 1–1.03)
UROBILINOGEN UR STRIP-ACNC: <2 MG/DL

## 2022-06-11 PROCEDURE — 99214 OFFICE O/P EST MOD 30 MIN: CPT | Performed by: INTERNAL MEDICINE

## 2022-06-11 PROCEDURE — 80048 BASIC METABOLIC PNL TOTAL CA: CPT

## 2022-06-11 PROCEDURE — 4004F PT TOBACCO SCREEN RCVD TLK: CPT | Performed by: INTERNAL MEDICINE

## 2022-06-11 PROCEDURE — 1160F RVW MEDS BY RX/DR IN RCRD: CPT | Performed by: INTERNAL MEDICINE

## 2022-06-11 PROCEDURE — 36415 COLL VENOUS BLD VENIPUNCTURE: CPT

## 2022-06-11 PROCEDURE — 81003 URINALYSIS AUTO W/O SCOPE: CPT | Performed by: INTERNAL MEDICINE

## 2022-06-11 PROCEDURE — 3074F SYST BP LT 130 MM HG: CPT | Performed by: INTERNAL MEDICINE

## 2022-06-11 PROCEDURE — 3078F DIAST BP <80 MM HG: CPT | Performed by: INTERNAL MEDICINE

## 2022-06-11 RX ORDER — HYDROCODONE BITARTRATE AND ACETAMINOPHEN 5; 325 MG/1; MG/1
2 TABLET ORAL EVERY 6 HOURS PRN
Qty: 100 TABLET | Refills: 0 | Status: SHIPPED | OUTPATIENT
Start: 2022-06-11 | End: 2022-07-06 | Stop reason: SDUPTHER

## 2022-06-11 NOTE — PATIENT INSTRUCTIONS
Multiple chronic diagnoses are treated and stable as noted above  The patient needs to do the chronic narcotic intake  Colonoscopy is recommended given that 3 years ago there was a tubular adenoma       The patient has a question of whether not he has CKD stage 3  The calculated GFR would indicate that but I would like him to do a 24 hour urine for creatinine clearance  Further recommendations on that subject will await the completion of that examination  This will require simultaneous BMP  Yearly follow-up

## 2022-06-11 NOTE — PROGRESS NOTES
Assessment/Plan:       Diagnoses and all orders for this visit:    Stage 3 chronic kidney disease, unspecified whether stage 3a or 3b CKD ([facility] [address] )  -     Creatinine Clearance 24 Hr; Future    Cervical spinal stenosis  -     HYDROcodone-acetaminophen (NORCO) 5-325 mg per tablet; Take 2 tablets by mouth every 6 (six) hours as needed for pain Max Daily Amount: 8 tablets    Continuous opioid dependence (HCC)    Tubular adenoma of colon  -     Ambulatory referral for colonoscopy; Future                Subjective:      Patient ID: Nasima Samayoa is a 67 y o  male  Blood pressure is treated   Hyperlipidemia is treated  He has BPH diagnosis but his PSAs have been low and he has minimal urinary symptoms  Asbestos exposure but no symptoms    Occasionally gets intertrigo in the groin  Left facial paresthesia     Last year he had reported some paresthesias of the left lower face but this seems to have resolved on its own  Chronic bicipital rupture right side  Is quite visible but he has no functional deficit  Spinal stenosis currently taking hydrocodone with good relief  Lumbar pain with bilateral neurogenic claudication and some weakness  This is treated with chronic narcotic pain medication and the patient has been using these drugs responsibly but he needs to do the chronic narcotic intake now  When examined in December he had bilateral carotid bruits  Subsequently echocardiogram was done which is normal and carotid studies were done which showed bilateral less than 50% stenosis  This needs lipid therapy, and yearly follow-up  He is on atorvastatin 10 mg and while that is a low-dose fortunately his LDL is only 71 so this appears to be adequate  Smokes a 3rd to half a pack a day  Refuses to do the  CT screen  Finally, recent BMP was done in estimated GFR is 58 mL/minute down from prior    Further assessment needed here and I will send the patient for the measured creatinine clearance              The following portions of the patient's history were reviewed and updated as appropriate:   He has a past medical history of Abnormal EKG, Asbestosis ([address] ), Bradycardia, History of sinus bradycardia, Hyperlipidemia, Prostate disorder, Shingles, and Spinal stenosis  ,  does not have any pertinent problems on file  ,   has a past surgical history that includes Appendectomy; Tonsillectomy; Neuroplasty / transposition median nerve at carpal tunnel; Quadriceps tendon repair (Right); and Patellar tendon repair (2019)  ,  family history includes Colon cancer in his father; Diabetes in his father; Heart disease in his father; Hypertension in his father; Stroke in his father; Transient ischemic attack in his father  ,   reports that he has been smoking cigarettes  He has smoked for the past 50 00 years  He has never used smokeless tobacco  He reports previous alcohol use  He reports that he does not use drugs  ,  has No Known Allergies     Current Outpatient Medications   Medication Sig Dispense Refill    atorvastatin (LIPITOR) 10 mg tablet TAKE 1 TABLET BY MOUTH EVERY DAY 90 tablet 3    fluticasone (FLONASE) 50 mcg/act nasal spray 1 spray into each nostril daily 48 mL 0    HYDROcodone-acetaminophen (NORCO) 5-325 mg per tablet Take 2 tablets by mouth every 6 (six) hours as needed for pain Max Daily Amount: 8 tablets 100 tablet 0    omeprazole (PriLOSEC) 20 mg delayed release capsule Take 1 capsule (20 mg total) by mouth daily (Patient taking differently: Take 20 mg by mouth as needed ) 90 capsule 1     No current facility-administered medications for this visit  Review of Systems   Musculoskeletal: Positive for arthralgias and back pain  All other systems reviewed and are negative  Objective:  Vitals:    06/11/22 1044   BP: 126/70   Pulse: 62   Temp: 97 9 °F (36 6 °C)   SpO2: 99%      Physical Exam  Constitutional:       Appearance: Normal appearance   He is not ill-appearing or diaphoretic  Neck:      Vascular: Carotid bruit present  Comments:   Bilateral low-pitched "bruit "heard in the neck  Right a bit greater than left  There is a heart murmur heard best right sternal border intercostal space 2  and I suspected bruits may be radiation  Cardiovascular:      Rate and Rhythm: Normal rate and regular rhythm  Heart sounds: Murmur heard  Pulmonary:      Effort: Pulmonary effort is normal       Breath sounds: Normal breath sounds  Abdominal:      General: Abdomen is flat  Neurological:      General: No focal deficit present  Mental Status: He is alert  Mental status is at baseline  Psychiatric:         Mood and Affect: Mood normal            Patient Instructions    Multiple chronic diagnoses are treated and stable as noted above  The patient needs to do the chronic narcotic intake  Colonoscopy is recommended given that 3 years ago there was a tubular adenoma     Yearly follow-up

## 2022-06-14 ENCOUNTER — TELEPHONE (OUTPATIENT)
Dept: OTHER | Facility: OTHER | Age: 77
End: 2022-06-14

## 2022-06-15 NOTE — TELEPHONE ENCOUNTER
Left message- needs opiod appointment with dr Barrington Boyce- please schedule   This would be initial but can use a 15 minute slot

## 2022-06-16 ENCOUNTER — APPOINTMENT (OUTPATIENT)
Dept: LAB | Facility: CLINIC | Age: 77
End: 2022-06-16
Payer: COMMERCIAL

## 2022-06-16 LAB
CREAT 24H UR-MRATE: 1.6 G/24HR (ref 0.8–1.8)
CREAT CL 24H UR+SERPL-VRATE: 84.26 ML/MIN (ref 80–125)
CREAT SERPL-MCNC: 1.15 MG/DL (ref 0.6–1.3)
CREAT UR-MCNC: 60.8 MG/DL
SPECIMEN VOL UR: 2600 ML

## 2022-06-16 PROCEDURE — 82575 CREATININE CLEARANCE TEST: CPT

## 2022-06-16 PROCEDURE — 36415 COLL VENOUS BLD VENIPUNCTURE: CPT

## 2022-06-16 PROCEDURE — 82565 ASSAY OF CREATININE: CPT

## 2022-07-06 ENCOUNTER — TELEPHONE (OUTPATIENT)
Dept: INTERNAL MEDICINE CLINIC | Facility: CLINIC | Age: 77
End: 2022-07-06

## 2022-07-06 ENCOUNTER — OFFICE VISIT (OUTPATIENT)
Dept: INTERNAL MEDICINE CLINIC | Facility: CLINIC | Age: 77
End: 2022-07-06
Payer: COMMERCIAL

## 2022-07-06 VITALS
BODY MASS INDEX: 29.66 KG/M2 | TEMPERATURE: 96.6 F | SYSTOLIC BLOOD PRESSURE: 154 MMHG | DIASTOLIC BLOOD PRESSURE: 60 MMHG | WEIGHT: 189 LBS | HEIGHT: 67 IN | HEART RATE: 54 BPM | OXYGEN SATURATION: 98 % | RESPIRATION RATE: 20 BRPM

## 2022-07-06 DIAGNOSIS — M79.672 LEFT FOOT PAIN: Primary | ICD-10-CM

## 2022-07-06 DIAGNOSIS — F11.90 CHRONIC, CONTINUOUS USE OF OPIOIDS: Primary | ICD-10-CM

## 2022-07-06 DIAGNOSIS — K21.9 GASTROESOPHAGEAL REFLUX DISEASE: ICD-10-CM

## 2022-07-06 DIAGNOSIS — E78.00 HIGH CHOLESTEROL: ICD-10-CM

## 2022-07-06 DIAGNOSIS — M79.671 RIGHT FOOT PAIN: ICD-10-CM

## 2022-07-06 DIAGNOSIS — M48.02 CERVICAL SPINAL STENOSIS: ICD-10-CM

## 2022-07-06 DIAGNOSIS — J61 ASBESTOSIS (HCC): ICD-10-CM

## 2022-07-06 PROCEDURE — 3725F SCREEN DEPRESSION PERFORMED: CPT | Performed by: INTERNAL MEDICINE

## 2022-07-06 PROCEDURE — 99214 OFFICE O/P EST MOD 30 MIN: CPT | Performed by: INTERNAL MEDICINE

## 2022-07-06 PROCEDURE — 3077F SYST BP >= 140 MM HG: CPT | Performed by: INTERNAL MEDICINE

## 2022-07-06 PROCEDURE — 3078F DIAST BP <80 MM HG: CPT | Performed by: INTERNAL MEDICINE

## 2022-07-06 PROCEDURE — 1160F RVW MEDS BY RX/DR IN RCRD: CPT | Performed by: INTERNAL MEDICINE

## 2022-07-06 RX ORDER — HYDROCODONE BITARTRATE AND ACETAMINOPHEN 5; 325 MG/1; MG/1
2 TABLET ORAL EVERY 6 HOURS PRN
Qty: 100 TABLET | Refills: 0 | Status: SHIPPED | OUTPATIENT
Start: 2022-07-06 | End: 2022-08-08 | Stop reason: SDUPTHER

## 2022-07-06 RX ORDER — OMEPRAZOLE 20 MG/1
20 CAPSULE, DELAYED RELEASE ORAL DAILY
Qty: 90 CAPSULE | Refills: 1 | Status: SHIPPED | OUTPATIENT
Start: 2022-07-06

## 2022-07-06 RX ORDER — ATORVASTATIN CALCIUM 10 MG/1
10 TABLET, FILM COATED ORAL DAILY
Qty: 90 TABLET | Refills: 3 | Status: SHIPPED | OUTPATIENT
Start: 2022-07-06

## 2022-07-06 NOTE — PROGRESS NOTES
Assessment/Plan:       Diagnoses and all orders for this visit:    Chronic, continuous use of opioids  -     [facility] All Prescribed Meds  -     Amphetamines, Methamphetamines  -     Butalbital  -     Phenobarbital  -     Secobarbital  -     Temazepam  -     Alprazolam  -     Clonazepam  -     Diazepam  -     Lorazepam  -     Oxazepam  -     Gabapentin  -     Pregabalin  -     Cocaine  -     Heroin  -     Buprenorphine  -     Levorphanol  -     Meperidine  -     Naltrexone  -     Fentanyl  -     Methadone  -     Oxycodone  -     Oxymorphone  -     Tapentadol  -     THC  -     Tramadol  -     Codeine, Hydrocodone, Hydropmorphone, Morphine  -     Bath Salts  -     Ethyl Glucuronide/Ethyl Sulfate  -     Kratom  -     Spice  -     Methylphenidate  -     Phentermine  -     Validity Oxidant  -     Validity Creatinine  -     Validity pH  -     Validity Specific    Right foot pain  -     XR foot 3+ vw right; Future  -     Sedimentation rate, automated; Future  -     Uric acid; Future  -     Lyme Total Antibody Profile with reflex to WB; Future    Asbestosis ([facility] [address] )                Subjective:      Patient ID: Tonia Carmichael is a 65 y o  male  2 issues today  opioid management: This patient takes hydrocodone on a regular basis for ongoing lumbar spinal stenosis and cervical spinal stenosis and also osteoarthritis  Tylenol has not been effective   NSAIDs have not been effective  the patient in the past has been reported to have reduction in kidney function  This is based upon the BMP and the calculated GFR  I had him do 24 hour urine for creatinine clearance of 14 this is quite good at 84 mL/minute  The patient has been taking a stable dose of opioids for years  There have never been any behaviors suggesting of misuse or diversion  Injury to the right foot:  He was hiking 2 days ago and in some fashion twisted his foot  He was wearing a boot    Immediately thereafter he developed some painful swelling of the medial aspect of the right foot which has persisted and there is an area of tenderness to palpation overlying the 1st metatarsal     new  Blood pressure is treated   Hyperlipidemia is treated  He has BPH diagnosis but his PSAs have been low and he has minimal urinary symptoms  Asbestos exposure but no symptoms    Occasionally gets intertrigo in the groin  Left facial paresthesia     Last year he had reported some paresthesias of the left lower face but this seems to have resolved on its own  Chronic bicipital rupture right side  Is quite visible but he has no functional deficit  Spinal stenosis currently taking hydrocodone with good relief  Lumbar pain with bilateral neurogenic claudication and some weakness  This is treated with chronic narcotic pain medication and the patient has been using these drugs responsibly but he needs to do the chronic narcotic intake now  When examined in December he had bilateral carotid bruits  Subsequently echocardiogram was done which is normal and carotid studies were done which showed bilateral less than 50% stenosis  This needs lipid therapy, and yearly follow-up  He is on atorvastatin 10 mg and while that is a low-dose fortunately his LDL is only 71 so this appears to be adequate  Smokes a 3rd to half a pack a day  Refuses to do the  CT screen  Finally, recent BMP was done in estimated GFR is 58 mL/minute down from prior  Further assessment needed here and I will send the patient for the measured creatinine clearance              The following portions of the patient's history were reviewed and updated as appropriate:   He has a past medical history of Abnormal EKG, Asbestosis ([address] ), Bradycardia, History of sinus bradycardia, Hyperlipidemia, Prostate disorder, Shingles, and Spinal stenosis  ,  does not have any pertinent problems on file  ,   has a past surgical history that includes Appendectomy; Tonsillectomy;  Neuroplasty / transposition median nerve at carpal tunnel; Quadriceps tendon repair (Right); and Patellar tendon repair (2019)  ,  family history includes Colon cancer in his father; Diabetes in his father; Heart disease in his father; Hypertension in his father; Stroke in his father; Transient ischemic attack in his father  ,   reports that he has been smoking cigarettes  He has smoked for the past 50 00 years  He has never used smokeless tobacco  He reports previous alcohol use  He reports that he does not use drugs  ,  has No Known Allergies     Current Outpatient Medications   Medication Sig Dispense Refill    atorvastatin (LIPITOR) 10 mg tablet TAKE 1 TABLET BY MOUTH EVERY DAY 90 tablet 3    fluticasone (FLONASE) 50 mcg/act nasal spray 1 spray into each nostril daily 48 mL 0    HYDROcodone-acetaminophen (NORCO) 5-325 mg per tablet Take 2 tablets by mouth every 6 (six) hours as needed for pain Max Daily Amount: 8 tablets 100 tablet 0    omeprazole (PriLOSEC) 20 mg delayed release capsule Take 1 capsule (20 mg total) by mouth daily (Patient taking differently: Take 20 mg by mouth as needed ) 90 capsule 1     No current facility-administered medications for this visit  Review of Systems   Musculoskeletal: Positive for arthralgias, back pain and gait problem  Objective:  Vitals:    07/06/22 0950   BP: 154/60   Pulse: (!) 54   Resp: 20   Temp: (!) 96 6 °F (35 9 °C)   SpO2: 98%      Physical Exam  Constitutional:       Appearance: Normal appearance  Musculoskeletal:         General: Swelling and tenderness present  Comments: Swelling and tenderness medial aspect of the right foot   Skin:     General: Skin is warm and dry  Findings: No bruising or erythema  Neurological:      Mental Status: He is alert     Psychiatric:         Mood and Affect: Mood normal          Judgment: Judgment normal            Patient Instructions     Goals of care:  Maximize your health and quality of life by:   · Increasing your level of function and activity  · Decreasing the negative effects of pain on your life  · Minimizing the risks and side effects of medications and ensuring safe use of opioid medication     Ways for you to help meet your goals:  Maintain a healthy lifestyle  This includes proper nutrition, regular physical activity as able, try for 8 hours of sleep per night, use stress reduction strategies, avoid triggers  Opioid dependence:  Linear drug screen to be done  right foot pain after injury:  X-ray of the affected area but also screen for gout and Lyme     further recommendations will depend upon the results of the studies  we will continue stable dose of opioids  Risks and side effects of opioid use:  Prescription opioids carry serious risks of addiction and  overdose, especially with prolonged use  An opioid overdose,  often marked by slowed breathing, can cause sudden death  The  use of prescription opioids can have a number of side effects as  well, even when taken as directed:  · Tolerance--meaning you might need to take more of a medication for the same pain relief  · Physical dependence--meaning you have symptoms of withdrawal when a medication is stopped  · Increased sensitivity to pain  · Constipation  · Nausea, vomiting, dry mouth  · Sleepiness and dizziness   · Confusion  · Depression  · Low levels of testosterone that can result in lower sex drive, energy, and strength  · Itching and sweating    If you are prescribed opioids for pain:  · Never take opioids in greater amounts or more often than prescribed  · Help prevent misuse and abuse  - Never sell or share prescription opioids         - Never use another persons prescription opioids  · Store prescription opioids in a secure place and out of reach of others (this may include visitors, children, friends, and family)    · Safely dispose of unused prescription opioids: Find your community drug take-back program or your pharmacy mail-back program, or flush them down the toilet, following guidance from the Food and Drug Administration (www fda gov/Drugs/ResourcesForYou)  · Visit www cdc gov/drugoverdose to learn about the risks of opioid abuse and overdose  · If you believe you may be struggling with addiction, tell your health care provider and ask for guidance or call [address] at 8-652-478-[facility]

## 2022-07-06 NOTE — PATIENT INSTRUCTIONS
right foot pain after injury:  X-ray of the affected area but also screen for gout and Lyme     further recommendations will depend upon the results of the studies  we will continue stable dose of opioids  Goals of care:  Maximize your health and quality of life by: Increasing your level of function and activity  Decreasing the negative effects of pain on your life  Minimizing the risks and side effects of medications and ensuring safe use of opioid medication     Ways for you to help meet your goals:  Maintain a healthy lifestyle  This includes proper nutrition, regular physical activity as able, try for 8 hours of sleep per night, use stress reduction strategies, avoid triggers  Opioid dependence:  Linear drug screen to be done  Risks and side effects of opioid use:  Prescription opioids carry serious risks of addiction and  overdose, especially with prolonged use  An opioid overdose,  often marked by slowed breathing, can cause sudden death  The  use of prescription opioids can have a number of side effects as  well, even when taken as directed: Tolerance--meaning you might need to take more of a medication for the same pain relief  Physical dependence--meaning you have symptoms of withdrawal when a medication is stopped  Increased sensitivity to pain  Constipation  Nausea, vomiting, dry mouth  Sleepiness and dizziness   Confusion  Depression  Low levels of testosterone that can result in lower sex drive, energy, and strength  Itching and sweating    If you are prescribed opioids for pain:  Never take opioids in greater amounts or more often than prescribed  Help prevent misuse and abuse  - Never sell or share prescription opioids         - Never use another persons prescription opioids  Store prescription opioids in a secure place and out of reach of others (this may include visitors, children, friends, and family)    Safely dispose of unused prescription opioids: Find your community drug take-back program or your pharmacy mail-back program, or flush them down the toilet, following guidance from the Food and Drug Administration (www fda gov/Drugs/ResourcesForYou)  Visit www cdc gov/drugoverdose to learn about the risks of opioid abuse and overdose  If you believe you may be struggling with addiction, tell your health care provider and ask for guidance or call [facility] at [phone]

## 2022-07-06 NOTE — TELEPHONE ENCOUNTER
Done EMERGENCY DEPARTMENT HISTORY AND PHYSICAL EXAM      Please note that this dictation was completed with the assistance of \"Dragon\", the computer voice recognition software. Quite often unanticipated grammatical, syntax, homophones, and other interpretive errors are inadvertently transcribed by the computer software. Please disregard these errors and any errors that have escaped final proofreading. Thank you. Patient: Sina Alberts  DOS: 01/10/22  : 1959  MRN: [id number]  History of Presenting Illness     Chief Complaint   Patient presents with    Abdominal Pain     History Provided By: Patient/family/EMS (if applicable)    HPI: Sina Alberts, 60 y.o. male with past medical history as documented below presents to the ED with c/o of acute onset of 24 hours of diffuse generalized sharp abdominal pain. Patient reports concern for constipation as well as he has not had a bowel movement in 24 hours. He is passing flatus. He has taken OTC meds with no relief. Pt denies any other exacerbating or ameliorating factors. Additionally, pt specifically denies any recent fever, chills, headache, nausea, vomiting, CP, SOB, lightheadedness, dizziness, numbness, weakness, lower extremity swelling, heart palpitations, urinary sxs, diarrhea,  melena, hematochezia, cough, or congestion. There are no other complaints, changes or physical findings pertinent to the HPI at this time.     PCP: Areli Lira MD  Past History   Past Medical History:  Past Medical History:   Diagnosis Date    Arthritis     Eczema 2012   Type 2 DM    Past Surgical History:  Past Surgical History:   Procedure Laterality Date    COLONOSCOPY N/A 2020    COLONOSCOPY performed by Geoff Grubbs MD at [address] [place] [address] ENDOSCOPY    HX ORTHOPAEDIC      L knee scope       Family History:   Family history reviewed and was non-contributory, unless specified below:  Family History   Problem Relation Age of Onset    Hypertension Mother     OSTEOARTHRITIS Mother     No Known Problems Father     Diabetes Sister        Social History:  Social History     Tobacco Use    Smoking status: Current Every Day Smoker     Packs/day: 0.50    Smokeless tobacco: Never Used   Vaping Use    Vaping Use: Never used   Substance Use Topics    Alcohol use: Yes     Alcohol/week: 10.0 - 11.0 standard drinks     Types: 3 Cans of beer, 2 - 3 Shots of liquor, 5 Standard drinks or equivalent per week     Comment: daily    30 - 35 drinks weekly    Drug use: Not Currently     Types: Cocaine, Heroin     Comment: remote hx heroine and cocaine       Allergies: Allergies   Allergen Reactions    Doxycycline Itching    Pcn [Penicillins] Hives       Current Medications:  No current facility-administered medications on file prior to encounter. Current Outpatient Medications on File Prior to Encounter   Medication Sig Dispense Refill    lisinopriL (PRINIVIL, ZESTRIL) 2.5 mg tablet Take 1 Tablet by mouth daily. 90 Tablet 1    metFORMIN (GLUCOPHAGE) 1,000 mg tablet Take 1 Tablet by mouth two (2) times daily (with meals). 60 Tablet 6    glimepiride (AMARYL) 2 mg tablet Take 1 tablet by mouth twice daily 180 Tablet 0    traMADoL (ULTRAM) 50 mg tablet Take 1 Tablet by mouth daily as needed for Pain for up to 30 days. 30 Tablet 0    diclofenac (VOLTAREN) 1 % gel Apply  to affected area two (2) times daily as needed for Pain. (Patient not taking: Reported on 1/10/2022) 100 g 3    SITagliptin (Januvia) 50 mg tablet Take 1 Tablet by mouth daily. 30 Tablet 5    glucose blood VI test strips (blood glucose test) strip Check BG 1-2 times/day 100 Strip 11    Blood-Glucose Meter (ReliOn All-In-One Meter) monitoring kit Check BG 1-2 times/day 1 Kit 0    lancets misc Check BS  Each 11    acetaminophen (TYLENOL) 325 mg tablet Take 2 Tabs by mouth three (3) times daily as needed for Pain.  (Patient not taking: Reported on 1/10/2022) 60 Tab 2    folic acid (FOLVITE) 1 mg tablet Take 1 Tab by mouth daily. 30 Tab 0    multivitamin, tx-iron-ca-min (THERA-M W/ IRON) 9 mg iron-400 mcg tab tablet Take 1 Tab by mouth daily. 30 Tab 0    thiamine mononitrate (B-1) 100 mg tablet Take 1 Tab by mouth daily. 30 Tab 0     Review of Systems   A complete ROS was reviewed by me today and all other systems negative, unless otherwise specified below:  Review of Systems   Constitutional: Negative. Negative for chills and fever. HENT: Negative. Negative for congestion and sore throat. Eyes: Negative. Respiratory: Negative. Negative for cough, chest tightness, shortness of breath and wheezing. Cardiovascular: Negative. Negative for chest pain, palpitations and leg swelling. Gastrointestinal: Positive for abdominal pain and constipation. Negative for abdominal distention, blood in stool, diarrhea, nausea and vomiting. Endocrine: Negative. Genitourinary: Negative. Negative for dysuria, flank pain, frequency, hematuria and urgency. Musculoskeletal: Negative. Negative for arthralgias, back pain and myalgias. Skin: Negative. Negative for color change and rash. Neurological: Negative. Negative for dizziness, syncope, speech difficulty, weakness, light-headedness, numbness and headaches. Hematological: Negative. Psychiatric/Behavioral: Negative. Negative for confusion and self-injury. The patient is not nervous/anxious. All other systems reviewed and are negative. Physical Exam   Physical Exam  Vitals and nursing note reviewed. Constitutional:       Appearance: He is well-developed. He is not toxic-appearing. HENT:      Head: Normocephalic and atraumatic. Mouth/Throat:      Pharynx: No posterior oropharyngeal erythema. Eyes:      Conjunctiva/sclera: Conjunctivae normal.   Cardiovascular:      Rate and Rhythm: Normal rate and regular rhythm. Heart sounds: Normal heart sounds. No murmur heard. No friction rub. No gallop.     Pulmonary:      Effort: Pulmonary effort is normal. No respiratory distress. Breath sounds: Normal breath sounds. No wheezing or rales. Chest:      Chest wall: No tenderness. Abdominal:      General: Bowel sounds are normal. There is no distension. Palpations: Abdomen is soft. There is no mass. Tenderness: There is no abdominal tenderness. There is no guarding or rebound. Musculoskeletal:         General: Normal range of motion. Cervical back: Normal range of motion. Skin:     General: Skin is warm. Neurological:      General: No focal deficit present. Mental Status: He is alert and oriented to person, place, and time. Motor: No abnormal muscle tone. Psychiatric:         Behavior: Behavior is cooperative. Diagnostic Study Results     Laboratory Data  I have personally reviewed and interpreted all available laboratory results. Recent Results (from the past 24 hour(s))   CBC WITH AUTOMATED DIFF    Collection Time: 01/10/22  1:41 AM   Result Value Ref Range    WBC 4.7 4.1 - 11.1 K/uL    RBC 5.00 4. 10 - 5.70 M/uL    HGB 13.4 12.1 - 17.0 g/dL    HCT 43.2 36.6 - 50.3 %    MCV 86.4 80.0 - 99.0 FL    MCH 26.8 26.0 - 34.0 PG    MCHC 31.0 30.0 - 36.5 g/dL    RDW 12.9 11.5 - 14.5 %    PLATELET 185 (L) 022 - 400 K/uL    MPV 11.0 8.9 - 12.9 FL    NRBC 0.0 0  WBC    ABSOLUTE NRBC 0.00 0.00 - 0.01 K/uL    NEUTROPHILS 64 32 - 75 %    BAND NEUTROPHILS 21 (H) 0 - 6 %    LYMPHOCYTES 13 12 - 49 %    MONOCYTES 2 (L) 5 - 13 %    EOSINOPHILS 0 0 - 7 %    BASOPHILS 0 0 - 1 %    IMMATURE GRANULOCYTES 0 %    ABS. NEUTROPHILS 4.0 1.8 - 8.0 K/UL    ABS. LYMPHOCYTES 0.6 (L) 0.8 - 3.5 K/UL    ABS. MONOCYTES 0.1 0.0 - 1.0 K/UL    ABS. EOSINOPHILS 0.0 0.0 - 0.4 K/UL    ABS. BASOPHILS 0.0 0.0 - 0.1 K/UL    ABS. IMM.  GRANS. 0.0 K/UL    DF MANUAL      RBC COMMENTS NORMOCYTIC, NORMOCHROMIC     METABOLIC PANEL, COMPREHENSIVE    Collection Time: 01/10/22  1:41 AM   Result Value Ref Range    Sodium 137 136 - 145 mmol/L    Potassium 4.2 3.5 - 5.1 mmol/L    Chloride 102 97 - 108 mmol/L    CO2 28 21 - 32 mmol/L    Anion gap 7 5 - 15 mmol/L    Glucose 220 (H) 65 - 100 mg/dL    BUN 17 6 - 20 MG/DL    Creatinine 1.00 0.70 - 1.30 MG/DL    BUN/Creatinine ratio 17 12 - 20      GFR est AA >60 >60 ml/min/1.73m2    GFR est non-AA >60 >60 ml/min/1.73m2    Calcium 8.8 8.5 - 10.1 MG/DL    Bilirubin, total 0.2 0.2 - 1.0 MG/DL    ALT (SGPT) 27 12 - 78 U/L    AST (SGOT) 27 15 - 37 U/L    Alk. phosphatase 64 45 - 117 U/L    Protein, total 7.8 6.4 - 8.2 g/dL    Albumin 3.6 3.5 - 5.0 g/dL    Globulin 4.2 (H) 2.0 - 4.0 g/dL    A-G Ratio 0.9 (L) 1.1 - 2.2     LIPASE    Collection Time: 01/10/22  1:41 AM   Result Value Ref Range    Lipase 230 73 - 393 U/L   URINALYSIS W/ REFLEX CULTURE    Collection Time: 01/10/22  1:41 AM    Specimen: Urine   Result Value Ref Range    Color YELLOW/STRAW      Appearance CLEAR CLEAR      Specific gravity >1.030 (H) 1.003 - 1.030    pH (UA) 5.0 5.0 - 8.0      Protein 30 (A) NEG mg/dL    Glucose 250 (A) NEG mg/dL    Ketone Negative NEG mg/dL    Bilirubin Negative NEG      Blood Negative NEG      Urobilinogen 0.2 0.2 - 1.0 EU/dL    Nitrites Negative NEG      Leukocyte Esterase Negative NEG      WBC 0-4 0 - 4 /hpf    RBC 0-5 0 - 5 /hpf    Epithelial cells FEW FEW /lpf    Bacteria Negative NEG /hpf    UA:UC IF INDICATED CULTURE NOT INDICATED BY UA RESULT CNI         Radiologic Studies   I have personally reviewed and interpreted all available imaging studies and agree with radiology interpretation. XR ABD (KUB)   Final Result   No obstruction or free intraperitoneal air. CT Results  (Last 48 hours)    None        CXR Results  (Last 48 hours)    None        Medical Decision Making   I am the first and primary ED physician for this patient's ED visit today.     I reviewed our electronic medical record system for any past medical records that may contribute to the patient's current condition, including their past medical history, surgical history, social and family history. This also includes their most recent ED visits, previous hospitalizations and prior diagnostic data. I have reviewed and summarized the most pertinent findings in my HPI and MDM. Vital Signs Reviewed:  Patient Vitals for the past 24 hrs:   Temp Pulse Resp BP SpO2   01/10/22 0240    (!) 156/60    01/10/22 0110 98.1 °F (36.7 °C) 67 18 (!) 148/73 97 %       Records Reviewed: Nursing Notes, Old Medical Records, Previous electrocardiograms, Previous Radiology Studies and Previous Laboratory Studies, EMS reports    Provider Notes (Medical Decision Making):   Pt presents with acute abdominal pain; vital signs stable with currently a non-peritoneal exam; DDx includes: Gastroenteritis, hepatitis, pancreatitis, obstruction, appendicitis, viral illness, IBD, diverticulitis, mesenteric ischemia, AAA or descending dissection, ACS, kidney stone. Will get labs, treat symptomatically and obtain serial abdominal exams to determine if additional imaging is indicated. Will reassess and monitor closely. ED Course:   Initial assessment performed. I discussed presenting problems and concerns, and my formulated plan for today's visit with the patient and any available family members. I have encouraged them to ask questions as they arise throughout the visit. Social History     Tobacco Use    Smoking status: Current Every Day Smoker     Packs/day: 0.50    Smokeless tobacco: Never Used   Vaping Use    Vaping Use: Never used   Substance Use Topics    Alcohol use: Yes     Alcohol/week: 10.0 - 11.0 standard drinks     Types: 3 Cans of beer, 2 - 3 Shots of liquor, 5 Standard drinks or equivalent per week     Comment: daily    30 - 35 drinks weekly    Drug use: Not Currently     Types: Cocaine, Heroin     Comment: remote hx heroine and cocaine     TOBACCO COUNSELING:  Upon evaluation, pt expressed that they are a current tobacco user.  For approximately 3-5 mins, pt has been counseled on the dangers of smoking and was encouraged to quit as soon as possible in order to decrease further risks to their health. Pt has conveyed their understanding of the risks involved should they continue to use tobacco products. ED Orders Placed:  Orders Placed This Encounter    XR ABD (KUB)    CBC WITH AUTOMATED DIFF    METABOLIC PANEL, COMPREHENSIVE    LIPASE    URINALYSIS W/ REFLEX CULTURE    INSERT PERIPHERAL IV ONE TIME STAT    famotidine (PF) (PEPCID) 20 mg in 0.9% sodium chloride 10 mL injection    dicyclomine (BENTYL) capsule 20 mg    dicyclomine (BENTYL) 20 mg tablet    famotidine (Pepcid) 20 mg tablet    aluminum-magnesium hydroxide (MAALOX) 200-200 mg/5 mL suspension    polyethylene glycol (Miralax) 17 gram/dose powder    magnesium citrate solution 296 mL       ED Medications Administered During ED Course:  Medications   famotidine (PF) (PEPCID) 20 mg in 0.9% sodium chloride 10 mL injection (20 mg IntraVENous Given 1/10/22 0137)   dicyclomine (BENTYL) capsule 20 mg (20 mg Oral Given 1/10/22 0127)   magnesium citrate solution 296 mL (296 mL Oral Given 1/10/22 0241)        Progress Note:  I have just re-evaluated the patient. Patient reports improvement of sx's. I have reviewed His vital signs and determined there is currently no worsening in their condition or physical exam. Results have been reviewed with them and their questions have been answered. We will continue to review further results as they come available. For the constipation, patient counseled to push fluids, use Dulcolax oral and/or suppository until bowel movement occurs, then Colace or Surfak bid-tid to maintain soft bowel movement until normal pattern ensues. Maintain a high fiber diet with plenty of roughage, and 6-8 large glasses of water daily to avoid constipation in the future. Timing elimination to occur after meals, or after a hot drink, can also improve the situation long term. 1 or 2 Fleet enemas may be necessary. Patient will call PCP symptoms persist or worsen. Progress Note:  Pt reassessed and symptoms noted to have improved significantly after ED treatment. Pt is clinically stable for discharge. Sunshine Rodriguez's labs and imaging have been reviewed with him and available family. He verbally conveys understanding and agreement of the signs, symptoms, diagnosis, treatment and prognosis and additionally agrees to follow up as recommended with Dr. Albania Pollard MD and/or specialist as instructed. He agrees with the care plan we have created and conveys that all of his questions have been answered. Additionally, I have put together a packet of discharge instructions for him that include: 1) educational information regarding their diagnosis, 2) how to care for their diagnosis at home, as well a 3) list of reasons why they would want to return to the ED prior to their follow-up appointment should the patient's condition change or symptoms worsen. I have answered all questions to the patient's satisfaction. Strict return precautions given. He conveyed understanding and agreement with care plan. Vital signs stable for discharge. Disposition:  DISCHARGE  The pt is ready for discharge. The pt's signs, symptoms, diagnosis, and discharge instructions have been discussed and pt has conveyed their understanding. The pt is to follow up as recommended or return to ER should their symptoms worsen. Plan has been discussed and pt is in agreement. Plan:  1. Return precautions as discussed with patient and available family/caregiver. 2.   Discharge Medication List as of 1/10/2022  2:32 AM      START taking these medications    Details   dicyclomine (BENTYL) 20 mg tablet Take 1 Tablet by mouth every six (6) hours. , Normal, Disp-20 Tablet, R-0      famotidine (Pepcid) 20 mg tablet Take 1 Tablet by mouth two (2) times a day for 10 days. , Normal, Disp-20 Tablet, R-0      aluminum-magnesium hydroxide (MAALOX) 200-200 mg/5 mL suspension Take 15 mL by mouth every six (6) hours as needed for Indigestion. , Normal, Disp-300 mL, R-0      polyethylene glycol (Miralax) 17 gram/dose powder Take 17 g by mouth daily. 1 tablespoon with 8 oz of water daily, Normal, Disp-289 g, R-0         CONTINUE these medications which have NOT CHANGED    Details   lisinopriL (PRINIVIL, ZESTRIL) 2.5 mg tablet Take 1 Tablet by mouth daily. , Normal, Disp-90 Tablet, R-1      metFORMIN (GLUCOPHAGE) 1,000 mg tablet Take 1 Tablet by mouth two (2) times daily (with meals). , Normal, Disp-60 Tablet, R-6      glimepiride (AMARYL) 2 mg tablet Take 1 tablet by mouth twice daily, Normal, Disp-180 Tablet, R-0      traMADoL (ULTRAM) 50 mg tablet Take 1 Tablet by mouth daily as needed for Pain for up to 30 days. , Normal, Disp-30 Tablet, R-0      diclofenac (VOLTAREN) 1 % gel Apply  to affected area two (2) times daily as needed for Pain., Normal, Disp-100 g, R-3      SITagliptin (Januvia) 50 mg tablet Take 1 Tablet by mouth daily. , Normal, Disp-30 Tablet, R-5      glucose blood VI test strips (blood glucose test) strip Check BG 1-2 times/day, Normal, Disp-100 Strip, R-11      Blood-Glucose Meter (ReliOn All-In-One Meter) monitoring kit Check BG 1-2 times/day, Normal, Disp-1 Kit, R-0      lancets misc Check BS BID, Normal, Disp-100 Each, R-11      acetaminophen (TYLENOL) 325 mg tablet Take 2 Tabs by mouth three (3) times daily as needed for Pain., Normal, Disp-60 Tab, R-2      folic acid (FOLVITE) 1 mg tablet Take 1 Tab by mouth daily. , OTC, Disp-30 Tab, R-0      multivitamin, tx-iron-ca-min (THERA-M W/ IRON) 9 mg iron-400 mcg tab tablet Take 1 Tab by mouth daily. , OTC, Disp-30 Tab, R-0      thiamine mononitrate (B-1) 100 mg tablet Take 1 Tab by mouth daily. , OTC, Disp-30 Tab, R-0           3.    Follow-up Information     Follow up With Specialties Details Why [address] [facility] EMERGENCY DEPT Emergency Medicine  As needed, If symptoms worsen [address] [place] Lynn Gomez MD Internal Medicine  As needed, If symptoms worsen [address] [facility]      Khadijah Barth MD Gastroenterology  As needed, If symptoms worsen [address] [facility]  [address] [facility]  [place]  [phone]          Instructed to return to ED if worse  Diagnosis   Clinical Impression:  1. Acute abdominal pain    2. Gastritis and duodenitis    3. Constipation, unspecified constipation type    4. Uncontrolled type 2 diabetes mellitus with hyperglycemia ([facility] [address]    5. Tobacco abuse      Attestation:  Shannan Gonzales MD, am the attending of record for this patient. I personally performed the services described in this documentation on this date, 1/10/2022 for patient, Jocelyne Griffin. I have reviewed the chart and verified that the record is accurate and complete.

## 2022-07-06 NOTE — TELEPHONE ENCOUNTER
Was seen today by philly  There is an issue with his xray order, states xray of right foot but it is the left foot that needs the xray      Please call patient when order fixed

## 2022-07-09 LAB
6MAM UR QL CFM: NEGATIVE NG/ML
7AMINOCLONAZEPAM UR QL CFM: NEGATIVE NG/ML
A-OH ALPRAZ UR QL CFM: NEGATIVE NG/ML
ACCEPTABLE CREAT UR QL: NORMAL MG/DL
ACCEPTIBLE SP GR UR QL: NORMAL
AMPHET UR QL CFM: NEGATIVE NG/ML
BUPRENORPHINE UR QL CFM: NEGATIVE NG/ML
BUTALBITAL UR QL CFM: NEGATIVE NG/ML
BZE UR QL CFM: NEGATIVE NG/ML
CODEINE UR QL CFM: NEGATIVE NG/ML
EDDP UR QL CFM: NEGATIVE NG/ML
ETHYL GLUCURONIDE UR QL CFM: NEGATIVE NG/ML
ETHYL SULFATE UR QL SCN: NEGATIVE NG/ML
EUTYLONE UR QL: NEGATIVE NG/ML
FENTANYL UR QL CFM: NEGATIVE NG/ML
GLIADIN IGG SER IA-ACNC: NEGATIVE NG/ML
HYDROCODONE UR QL CFM: NORMAL NG/ML
HYDROMORPHONE UR QL CFM: NORMAL NG/ML
LORAZEPAM UR QL CFM: NEGATIVE NG/ML
ME-PHENIDATE UR QL CFM: NEGATIVE NG/ML
MEPERIDINE UR QL CFM: NEGATIVE NG/ML
METHADONE UR QL CFM: NEGATIVE NG/ML
METHAMPHET UR QL CFM: NEGATIVE NG/ML
MORPHINE UR QL CFM: NEGATIVE NG/ML
NALTREXONE UR QL CFM: NEGATIVE NG/ML
NITRITE UR QL: NORMAL UG/ML
NORBUPRENORPHINE UR QL CFM: NEGATIVE NG/ML
NORDIAZEPAM UR QL CFM: NEGATIVE NG/ML
NORFENTANYL UR QL CFM: NEGATIVE NG/ML
NORHYDROCODONE UR QL CFM: NORMAL NG/ML
NORMEPERIDINE UR QL CFM: NEGATIVE NG/ML
NOROXYCODONE UR QL CFM: NEGATIVE NG/ML
OXAZEPAM UR QL CFM: NEGATIVE NG/ML
OXYCODONE UR QL CFM: NEGATIVE NG/ML
OXYMORPHONE UR QL CFM: NEGATIVE NG/ML
OXYMORPHONE UR QL CFM: NEGATIVE NG/ML
PHENOBARB UR QL CFM: NEGATIVE NG/ML
RESULT ALL_PRESCRIBED MEDS AND SPECIAL INSTRUCTIONS: NORMAL
SECOBARBITAL UR QL CFM: NEGATIVE NG/ML
SL AMB 3-METHYL-FENTANYL QUANTIFICATION: NORMAL NG/ML
SL AMB 4-ANPP QUANTIFICATION: NORMAL NG/ML
SL AMB 4-FIBF QUANTIFICATION: NORMAL NG/ML
SL AMB 5F-ADB-M7 METABOLITE QUANTIFICATION: NEGATIVE NG/ML
SL AMB 7-OH-MITRAGYNINE (KRATOM ALKALOID) QUANTIFICATION: NEGATIVE NG/ML
SL AMB AB-FUBINACA-M3 METABOLITE QUANTIFICATION: NEGATIVE NG/ML
SL AMB ACETYL FENTANYL QUANTIFICATION: NORMAL NG/ML
SL AMB ACETYL NORFENTANYL QUANTIFICATION: NORMAL NG/ML
SL AMB ACRYL FENTANYL QUANTIFICATION: NORMAL NG/ML
SL AMB BUTRYL FENTANYL QUANTIFICATION: NORMAL NG/ML
SL AMB CARFENTANIL QUANTIFICATION: NORMAL NG/ML
SL AMB CTHC (MARIJUANA METABOLITE) QUANTIFICATION: NEGATIVE NG/ML
SL AMB CYCLOPROPYL FENTANYL QUANTIFICATION: NORMAL NG/ML
SL AMB DEXTRORPHAN (DEXTROMETHORPHAN METABOLITE) QUANT: NEGATIVE NG/ML
SL AMB FURANYL FENTANYL QUANTIFICATION: NORMAL NG/ML
SL AMB GABAPENTIN QUANTIFICATION: NEGATIVE
SL AMB JWH018 METABOLITE QUANTIFICATION: NEGATIVE NG/ML
SL AMB JWH073 METABOLITE QUANTIFICATION: NEGATIVE NG/ML
SL AMB MDMB-FUBINACA-M1 METABOLITE QUANTIFICATION: NEGATIVE NG/ML
SL AMB METHOXYACETYL FENTANYL QUANTIFICATION: NORMAL NG/ML
SL AMB METHYLONE QUANTIFICATION: NEGATIVE NG/ML
SL AMB N-DESMETHYL U-47700 QUANTIFICATION: NORMAL NG/ML
SL AMB N-DESMETHYL-TRAMADOL QUANTIFICATION: NEGATIVE NG/ML
SL AMB PHENTERMINE QUANTIFICATION: NEGATIVE NG/ML
SL AMB PREGABALIN QUANTIFICATION: NEGATIVE
SL AMB RCS4 METABOLITE QUANTIFICATION: NEGATIVE NG/ML
SL AMB RITALINIC ACID QUANTIFICATION: NEGATIVE NG/ML
SL AMB U-47700 QUANTIFICATION: NORMAL NG/ML
SMOOTH MUSCLE AB TITR SER IF: NEGATIVE NG/ML
SPECIMEN DRAWN SERPL: NEGATIVE NG/ML
SPECIMEN PH ACCEPTABLE UR: NORMAL
TAPENTADOL UR QL CFM: NEGATIVE NG/ML
TEMAZEPAM UR QL CFM: NEGATIVE NG/ML
TEMAZEPAM UR QL CFM: NEGATIVE NG/ML
TRAMADOL UR QL CFM: NEGATIVE NG/ML
URATE/CREAT 24H UR: NEGATIVE NG/ML

## 2022-07-11 ENCOUNTER — APPOINTMENT (OUTPATIENT)
Dept: LAB | Facility: CLINIC | Age: 77
End: 2022-07-11
Payer: COMMERCIAL

## 2022-07-11 DIAGNOSIS — M79.671 RIGHT FOOT PAIN: ICD-10-CM

## 2022-07-11 LAB
B BURGDOR IGG+IGM SER-ACNC: <0.2 AI
ERYTHROCYTE [SEDIMENTATION RATE] IN BLOOD: 25 MM/HOUR (ref 0–19)
URATE SERPL-MCNC: 5.3 MG/DL (ref 4.2–8)

## 2022-07-11 PROCEDURE — 85652 RBC SED RATE AUTOMATED: CPT

## 2022-07-11 PROCEDURE — 86618 LYME DISEASE ANTIBODY: CPT

## 2022-07-11 PROCEDURE — 36415 COLL VENOUS BLD VENIPUNCTURE: CPT

## 2022-07-11 PROCEDURE — 84550 ASSAY OF BLOOD/URIC ACID: CPT

## 2022-08-08 DIAGNOSIS — M48.02 CERVICAL SPINAL STENOSIS: ICD-10-CM

## 2022-08-08 DIAGNOSIS — J30.89 NON-SEASONAL ALLERGIC RHINITIS, UNSPECIFIED TRIGGER: ICD-10-CM

## 2022-08-08 RX ORDER — FLUTICASONE PROPIONATE 50 MCG
1 SPRAY, SUSPENSION (ML) NASAL DAILY
Qty: 48 ML | Refills: 0 | Status: SHIPPED | OUTPATIENT
Start: 2022-08-08

## 2022-08-08 NOTE — TELEPHONE ENCOUNTER
Medication Refill Request     Name flonase   Dose/Frequency 1 spray into each nostril daily   Quantity 48ml   Verified pharmacy   [x]  Verified ordering Provider   [x]  Does patient have enough for the next 3 days? Yes [x] No []      Medication Refill Request     Name hydrocodone-acetaminophen   Dose/Frequency 5-325 2 tablets every 6 hours as needed   Quantity 100  Verified pharmacy   [x]  Verified ordering Provider   [x]  Does patient have enough for the next 3 days?  Yes [x] No []

## 2022-08-09 RX ORDER — HYDROCODONE BITARTRATE AND ACETAMINOPHEN 5; 325 MG/1; MG/1
2 TABLET ORAL EVERY 6 HOURS PRN
Qty: 100 TABLET | Refills: 0 | Status: SHIPPED | OUTPATIENT
Start: 2022-08-09 | End: 2022-09-02 | Stop reason: SDUPTHER

## 2022-09-02 DIAGNOSIS — M48.02 CERVICAL SPINAL STENOSIS: ICD-10-CM

## 2022-09-02 NOTE — TELEPHONE ENCOUNTER
Medication:Norco 5-325mgs  Medication failed [employer] protocol  Please forward to your office staff for further review as this medication was reviewed by a [employer] RN

## 2022-09-02 NOTE — TELEPHONE ENCOUNTER
Medication Refill Request     Name hydrocodone   Dose/Frequency 325/ q6hr prn  Quantity 100  Verified pharmacy   [x]  Verified ordering Provider   [x]  Does patient have enough for the next 3 days?  Yes [x] No []

## 2022-09-07 RX ORDER — HYDROCODONE BITARTRATE AND ACETAMINOPHEN 5; 325 MG/1; MG/1
2 TABLET ORAL EVERY 6 HOURS PRN
Qty: 100 TABLET | Refills: 0 | Status: SHIPPED | OUTPATIENT
Start: 2022-09-07 | End: 2022-10-13 | Stop reason: SDUPTHER

## 2022-09-13 ENCOUNTER — OFFICE VISIT (OUTPATIENT)
Dept: INTERNAL MEDICINE CLINIC | Facility: CLINIC | Age: 77
End: 2022-09-13
Payer: COMMERCIAL

## 2022-09-13 VITALS
DIASTOLIC BLOOD PRESSURE: 74 MMHG | BODY MASS INDEX: 29.38 KG/M2 | OXYGEN SATURATION: 97 % | HEIGHT: 67 IN | SYSTOLIC BLOOD PRESSURE: 122 MMHG | HEART RATE: 50 BPM | WEIGHT: 187.2 LBS | TEMPERATURE: 97.6 F | RESPIRATION RATE: 17 BRPM

## 2022-09-13 DIAGNOSIS — I49.9 CARDIAC ARRHYTHMIA, UNSPECIFIED CARDIAC ARRHYTHMIA TYPE: Primary | ICD-10-CM

## 2022-09-13 PROCEDURE — 93000 ELECTROCARDIOGRAM COMPLETE: CPT | Performed by: INTERNAL MEDICINE

## 2022-09-13 PROCEDURE — 1160F RVW MEDS BY RX/DR IN RCRD: CPT | Performed by: INTERNAL MEDICINE

## 2022-09-13 PROCEDURE — 99214 OFFICE O/P EST MOD 30 MIN: CPT | Performed by: INTERNAL MEDICINE

## 2022-09-13 PROCEDURE — 3074F SYST BP LT 130 MM HG: CPT | Performed by: INTERNAL MEDICINE

## 2022-09-13 PROCEDURE — 3078F DIAST BP <80 MM HG: CPT | Performed by: INTERNAL MEDICINE

## 2022-09-13 RX ORDER — ASPIRIN 81 MG/1
81 TABLET, CHEWABLE ORAL DAILY
COMMUNITY

## 2022-09-13 NOTE — PATIENT INSTRUCTIONS
Sinus bradycardia   I would like to see a Holter to look for any sign of AFib  Meanwhile continue baby aspirin daily

## 2022-09-13 NOTE — PROGRESS NOTES
Assessment/Plan:       Diagnoses and all orders for this visit:    Cardiac arrhythmia, unspecified cardiac arrhythmia type  -     Holter monitor; Future    Other orders  -     aspirin 81 mg chewable tablet; Chew 81 mg daily                Subjective:      Patient ID: Gama Varner is a 65 y o  male  Bradycardia  A patient with longstanding chronic bradycardia  He has been documented defibrillator at times but there is no recent evidence of any sustained AFib     He wanted this reviewed again  He is completely asymptomatic     Resting pulse about 50 with occasional irregularity; however it was not irregularly irregular   With running up and down the zapata 3 times he speeds up to 102 regular rhythm   Slows down rapidly arrest     EKG documents sinus bradycardia rate of 50  No change from prior  The following portions of the patient's history were reviewed and updated as appropriate:   He has a past medical history of Abnormal EKG, Asbestosis ([address] ), Bradycardia, History of sinus bradycardia, Hyperlipidemia, Prostate disorder, Shingles, and Spinal stenosis  ,  does not have any pertinent problems on file  ,   has a past surgical history that includes Appendectomy; Tonsillectomy; Neuroplasty / transposition median nerve at carpal tunnel; Quadriceps tendon repair (Right); and Patellar tendon repair (2019)  ,  family history includes Colon cancer in his father; Diabetes in his father; Heart disease in his father; Hypertension in his father; Stroke in his father; Transient ischemic attack in his father  ,   reports that he has been smoking cigarettes  He has smoked for the past 50 00 years  He has never used smokeless tobacco  He reports previous alcohol use  He reports that he does not use drugs  ,  has No Known Allergies     Current Outpatient Medications   Medication Sig Dispense Refill    aspirin 81 mg chewable tablet Chew 81 mg daily      atorvastatin (LIPITOR) 10 mg tablet Take 1 tablet (10 mg total) by mouth daily 90 tablet 3    fluticasone (FLONASE) 50 mcg/act nasal spray 1 spray into each nostril daily 48 mL 0    HYDROcodone-acetaminophen (NORCO) 5-325 mg per tablet Take 2 tablets by mouth every 6 (six) hours as needed for pain Max Daily Amount: 8 tablets 100 tablet 0    omeprazole (PriLOSEC) 20 mg delayed release capsule Take 1 capsule (20 mg total) by mouth daily (Patient taking differently: Take 20 mg by mouth if needed) 90 capsule 1     No current facility-administered medications for this visit  Review of Systems   Respiratory: Negative for shortness of breath and wheezing  Cardiovascular: Negative for chest pain and leg swelling  Objective:  Vitals:    09/13/22 0949   BP: 122/74   Pulse: (!) 50   Resp: 17   Temp: 97 6 °F (36 4 °C)   SpO2: 97%      Physical Exam  Constitutional:       General: He is not in acute distress  Appearance: He is normal weight  Cardiovascular:      Rate and Rhythm: Regular rhythm  Bradycardia present  Pulses: Normal pulses  Pulmonary:      Effort: Pulmonary effort is normal       Breath sounds: Normal breath sounds  Neurological:      General: No focal deficit present  Mental Status: He is alert  There are no Patient Instructions on file for this visit

## 2022-09-23 ENCOUNTER — HOSPITAL ENCOUNTER (OUTPATIENT)
Dept: NON INVASIVE DIAGNOSTICS | Facility: CLINIC | Age: 77
Discharge: HOME | End: 2022-09-23
Payer: COMMERCIAL

## 2022-09-23 DIAGNOSIS — I49.9 CARDIAC ARRHYTHMIA, UNSPECIFIED CARDIAC ARRHYTHMIA TYPE: ICD-10-CM

## 2022-09-23 PROCEDURE — 93225 XTRNL ECG REC<48 HRS REC: CPT

## 2022-09-23 PROCEDURE — 93226 XTRNL ECG REC<48 HR SCAN A/R: CPT

## 2022-09-26 PROCEDURE — 93227 XTRNL ECG REC<48 HR R&I: CPT | Performed by: INTERNAL MEDICINE

## 2022-10-12 ENCOUNTER — OFFICE VISIT (OUTPATIENT)
Dept: CARDIOLOGY CLINIC | Facility: CLINIC | Age: 77
End: 2022-10-12
Payer: COMMERCIAL

## 2022-10-12 VITALS
SYSTOLIC BLOOD PRESSURE: 132 MMHG | HEIGHT: 67 IN | WEIGHT: 192 LBS | BODY MASS INDEX: 30.13 KG/M2 | DIASTOLIC BLOOD PRESSURE: 72 MMHG | HEART RATE: 56 BPM

## 2022-10-12 DIAGNOSIS — E78.2 MIXED HYPERLIPIDEMIA: ICD-10-CM

## 2022-10-12 DIAGNOSIS — I10 ESSENTIAL HYPERTENSION: Primary | ICD-10-CM

## 2022-10-12 PROCEDURE — 99214 OFFICE O/P EST MOD 30 MIN: CPT | Performed by: INTERNAL MEDICINE

## 2022-10-12 NOTE — PROGRESS NOTES
Patient ID: Sarah Amin is a 65 y o  male  Plan:      History of sinus bradycardia  This continues but completely without symptoms  Longstanding  No evidence for heart block  Essential hypertension  Well controlled  Hyperlipidemia  Tolerating statin therapy  Follow up Plan/Other summary comments:  I remain available should the need arise for follow-up  HPI:  Patient comes in to see me again about bradycardia  I had seen him earlier this year  He recently had a workup which included a Holter monitor  For results see below  He remains completely asymptomatic for relative bradycardia  During the monitoring phase any episode of lightheadedness was not associated with his low heart rates  Most recent or relevant cardiac/vascular testing:    TTE 02/18/2022:  Normal LV and RV systolic function  Holter monitor report 09/23/2022:  Heart rate ranged from  beats per minute  The average heart rate was 53 beats per minute  Episodes of lightheadedness were not associated with his most bradycardic periods  No heart block  Past Surgical History:   Procedure Laterality Date   • APPENDECTOMY     • NEUROPLASTY / TRANSPOSITION MEDIAN NERVE AT CARPAL TUNNEL      Decompression    • PATELLAR TENDON REPAIR  2019   • QUADRICEPS TENDON REPAIR Right    • TONSILLECTOMY       CMP:   Lab Results   Component Value Date    K 4 4 06/11/2022     (H) 06/11/2022    CO2 30 06/11/2022    BUN 16 06/11/2022    CREATININE 1 15 06/16/2022    EGFR 58 06/11/2022       Lipid Profile:   Lab Results   Component Value Date    TRIG 77 01/19/2021    HDL 44 01/19/2021         Review of Systems   10  point ROS  was otherwise non pertinent or negative except as per HPI or as below  Gait: Normal          Objective:     /72   Pulse 56   Ht 5' 7" (1 702 m)   Wt 87 1 kg (192 lb)   BMI 30 07 kg/m²     PHYSICAL EXAM:    General:  Normal appearance in no distress  Eyes:  Anicteric    Oral mucosa: Moist   Neck:  No JVD  Carotid upstrokes are brisk without bruits  No masses  Chest:  Clear to auscultation  Cardiac:  No palpable PMI  Normal S1 and S2  No murmur gallop or rub  Abdomen:  Soft and nontender  No palpable organomegaly or aortic enlargement  Extremities:  No peripheral edema  Musculoskeletal:  Symmetric  Vascular:  Femoral pulses are brisk without bruits  Popliteal pulses are intact bilaterally  Pedal pulses are intact  Neuro:  Grossly symmetric  Psych:  Alert and oriented x3  Current Outpatient Medications:   •  aspirin 81 mg chewable tablet, Chew 81 mg daily, Disp: , Rfl:   •  atorvastatin (LIPITOR) 10 mg tablet, Take 1 tablet (10 mg total) by mouth daily, Disp: 90 tablet, Rfl: 3  •  fluticasone (FLONASE) 50 mcg/act nasal spray, 1 spray into each nostril daily (Patient taking differently: 1 spray into each nostril if needed), Disp: 48 mL, Rfl: 0  •  HYDROcodone-acetaminophen (NORCO) 5-325 mg per tablet, Take 2 tablets by mouth every 6 (six) hours as needed for pain Max Daily Amount: 8 tablets, Disp: 100 tablet, Rfl: 0  •  omeprazole (PriLOSEC) 20 mg delayed release capsule, Take 1 capsule (20 mg total) by mouth daily (Patient taking differently: Take 20 mg by mouth if needed), Disp: 90 capsule, Rfl: 1  No Known Allergies  Past Medical History:   Diagnosis Date   • Abnormal EKG    • Asbestosis ([facility] [address] )     L A  7/23/15   R    7/23/15     • Bradycardia    • History of sinus bradycardia    • Hyperlipidemia    • Prostate disorder    • Shingles    • Spinal stenosis            Social History     Tobacco Use   Smoking Status Former Smoker   • Years: 50 00   • Types: Cigarettes   • Quit date: 2022   • Years since quittin 1   Smokeless Tobacco Never Used

## 2022-10-13 DIAGNOSIS — M48.02 CERVICAL SPINAL STENOSIS: ICD-10-CM

## 2022-10-13 NOTE — TELEPHONE ENCOUNTER
Medication Refill Request     Name=HYDROcodone-acetaminophen (NORCO) 5-325 mg per tablet Dose/Frequency=Take 2 tablets by mouth every 6 (six) hours as needed for pain   Quantity =100  Verified pharmacy   [x]  Verified ordering Provider   [x]  Does patient have enough for the next 3 days?  Yes [x] No []

## 2022-10-14 RX ORDER — HYDROCODONE BITARTRATE AND ACETAMINOPHEN 5; 325 MG/1; MG/1
2 TABLET ORAL EVERY 6 HOURS PRN
Qty: 100 TABLET | Refills: 0 | Status: SHIPPED | OUTPATIENT
Start: 2022-10-14

## 2022-11-14 DIAGNOSIS — J30.89 NON-SEASONAL ALLERGIC RHINITIS, UNSPECIFIED TRIGGER: ICD-10-CM

## 2022-11-14 DIAGNOSIS — M48.02 CERVICAL SPINAL STENOSIS: ICD-10-CM

## 2022-11-14 RX ORDER — HYDROCODONE BITARTRATE AND ACETAMINOPHEN 5; 325 MG/1; MG/1
2 TABLET ORAL EVERY 6 HOURS PRN
Qty: 100 TABLET | Refills: 0 | Status: SHIPPED | OUTPATIENT
Start: 2022-11-14

## 2022-11-14 RX ORDER — FLUTICASONE PROPIONATE 50 MCG
1 SPRAY, SUSPENSION (ML) NASAL DAILY
Qty: 48 ML | Refills: 0 | Status: SHIPPED | OUTPATIENT
Start: 2022-11-14

## 2022-11-14 RX ORDER — HYDROCODONE BITARTRATE AND ACETAMINOPHEN 5; 325 MG/1; MG/1
2 TABLET ORAL EVERY 6 HOURS PRN
Qty: 100 TABLET | Refills: 0 | Status: CANCELLED | OUTPATIENT
Start: 2022-11-14

## 2022-12-06 ENCOUNTER — OFFICE VISIT (OUTPATIENT)
Dept: GASTROENTEROLOGY | Facility: CLINIC | Age: 77
End: 2022-12-06

## 2022-12-06 VITALS
DIASTOLIC BLOOD PRESSURE: 70 MMHG | HEIGHT: 67 IN | OXYGEN SATURATION: 98 % | BODY MASS INDEX: 30.13 KG/M2 | HEART RATE: 49 BPM | SYSTOLIC BLOOD PRESSURE: 140 MMHG | WEIGHT: 192 LBS

## 2022-12-06 DIAGNOSIS — D12.6 TUBULAR ADENOMA OF COLON: ICD-10-CM

## 2022-12-06 DIAGNOSIS — Z86.010 HISTORY OF COLON POLYPS: ICD-10-CM

## 2022-12-06 DIAGNOSIS — R10.32 LEFT LOWER QUADRANT ABDOMINAL PAIN: Primary | ICD-10-CM

## 2022-12-06 DIAGNOSIS — K92.1 HEMATOCHEZIA: ICD-10-CM

## 2022-12-06 NOTE — PROGRESS NOTES
[address] [facility]      Chief Complaint: Bleeding    HPI:  Aster Carrillo is a 65 y o   male who presents with history of colon polyps with his last colonoscopy May 2019  Benign adenoma was removed at that time and he was given a 5-year follow-up  However recently he has had 3 different attacks of left lower quadrant pain followed by diarrhea and hematochezia bright and dark red blood  It only lasted a day  The first followed him having a deer that he had shot out of the woods  The second 2 involved heavy lifting  He has no weight loss  No nausea or vomiting  No upper GI symptomatology at all  The patient has no episodes outside of these  He has no chest pain or shortness of breath  He has seen a cardiologist in the past   He has chronic bradycardia  He has no other issues at this time  Review of Systems:   Constitutional: No fever or chills, feels well, no tiredness, no recent weight gain or weight loss  HENT: No complaints of earache, no hearing loss, no nosebleeds, no nasal discharge, no sore throat, no hoarseness  Eyes: No complaints of eye pain, no red eyes, no discharge from eyes, no itchy eyes  Cardiovascular: No complaints of slow heart rate, no fast heart rate, no chest pain, no palpitations, no leg claudication, no lower extremity edema  Respiratory: No complaints of shortness of breath, no wheezing, no cough, no SOB on exertion, no orthopnea  Gastrointestinal: As noted in HPI  Genitourinary: No complaints of dysuria, no incontinence, no hesitancy, no nocturia  Musculoskeletal: Chronic back pain  Neurological: No complaints of headache, no confusion, no convulsions, no numbness or tingling, no dizziness or fainting, no limb weakness, no difficulty walking  Skin: No complaints of skin rash or skin lesions, no itching, no skin wound, no dry skin  Hematological/Lymphatic: No complaints of swollen glands, does not bleed easy     Allergic/Immunologic: No immunocompromised state  Endocrine:  No complaints of polyuria, no polydipsia  Psychiatric/Behavioral: is not suicidal, no sleep disturbances, no anxiety or depression, no change in personality, no emotional problems  Historical Information   Past Medical History:   Diagnosis Date   • Abnormal EKG    • Asbestosis ([address] )     L A  7/23/15   R    7/23/15     • Bradycardia    • Clotting disorder (HCC) 3 days   • History of sinus bradycardia    • Hyperlipidemia    • Hypertension    • Prostate disorder    • Shingles    • Spinal stenosis      Past Surgical History:   Procedure Laterality Date   • APPENDECTOMY     • NEUROPLASTY / TRANSPOSITION MEDIAN NERVE AT CARPAL TUNNEL      Decompression    • PATELLAR TENDON REPAIR  2019   • QUADRICEPS TENDON REPAIR Right    • TONSILLECTOMY       Social History   Social History     Substance and Sexual Activity   Alcohol Use Not Currently   • Alcohol/week: 1 0 standard drink   • Types: 1 Cans of beer per week     Social History     Substance and Sexual Activity   Drug Use No     Social History     Tobacco Use   Smoking Status Former   • Packs/day: 0 00   • Years: 50 00   • Pack years: 0 00   • Types: Cigarettes   • Quit date: 2022   • Years since quittin 2   Smokeless Tobacco Never     Family History   Problem Relation Age of Onset   • Transient ischemic attack Father    • Colon cancer Father    • Heart disease Father         cardiac disorder   • Stroke Father    • Diabetes Father    • Hypertension Father          Current Medications: has a current medication list which includes the following prescription(s): aspirin, atorvastatin, fluticasone, hydrocodone-acetaminophen, and omeprazole          Vital Signs: /70   Pulse (!) 49   Ht 5' 7" (1 702 m)   Wt 87 1 kg (192 lb)   SpO2 98%   BMI 30 07 kg/m²       Physical Exam:   Constitutional  General Appearance: No acute distress, well appearing and well nourished  Head  Normocephalic  Eyes  Conjunctivae and lids: No swelling, erythema, or discharge  Pupils and irises: Equal, round and reactive to light  Ears, Nose, Mouth, and Throat  External inspection of ears and nose: Normal  Nasal mucosa, septum and turbinates: Normal without edema or erythema/   Oropharynx: Normal with no erythema, edema, exudate or lesions  Neck  Normal range of motion  Neck supple  Cardiovascular  Auscultation of the heart: Normal rate and rhythm, normal S1 and S2 without murmurs  Examination of the extremities for edema and/or varicosities: Normal  Pulmonary/Chest  Respiratory effort: No increased work of breathing or signs of respiratory distress  Auscultation of lungs: Clear to auscultation, equal breath sounds bilaterally, no wheezes, rales, no rhonchi  Abdomen  Abdomen: Non-tender, no masses  Liver and spleen: No hepatomegaly or splenomegaly  Musculoskeletal  Gait and station: normal   Digits and Nails: normal without clubbing or cyanosis  Inspection/palpation of joints, bones, and muscles: Normal  Neurological  No nystagmus or asterixis  Skin  Skin and subcutaneous tissue: Normal without rashes or lesions  Lymphatic  Palpation of the lymph nodes in neck: No lymphadenopathy  Psychiatric  Orientation to person, place and time: Normal   Mood and affect: Normal          Labs:  Lab Results   Component Value Date    ALT 31 01/19/2021    AST 21 01/19/2021    BUN 16 06/11/2022    CALCIUM 9 2 06/11/2022     (H) 06/11/2022    CO2 30 06/11/2022    CREATININE 1 15 06/16/2022    HDL 44 01/19/2021    HCT 44 8 01/19/2021    HGB 14 8 01/19/2021     01/19/2021    K 4 4 06/11/2022    PSA 0 6 01/19/2021    TRIG 77 01/19/2021    WBC 7 04 01/19/2021         X-Rays & Procedures:   No orders to display           ______________________________________________________________________      Assessment & Plan:     Diagnoses and all orders for this visit:    Left lower quadrant abdominal pain  -     Colonoscopy;  Future    Tubular adenoma of colon  -     Ambulatory referral for colonoscopy    Hematochezia  -     Colonoscopy; Future    History of colon polyps  -     Colonoscopy; Future      Clinically this sounds like several attacks of ischemic colitis  Patient will undergo repeat colonoscopy  He will undergo Doppler of the mesenteric system  Further recommendations will be based on study results  He is advised to be careful with strenuous exercise  Answers for HPI/ROS submitted by the patient on 12/1/2022  Chronicity: new  Onset: in the past 7 days  Onset quality: gradual  Frequency: constantly  Episode duration: 2 Days  Progression since onset: waxing and waning  Pain location: LLQ  Pain - numeric: 5/10  Pain quality: aching  Radiates to: LLQ  anorexia: Yes  arthralgias: Yes  belching: No  diarrhea: Yes  dysuria: No  fever: No  flatus: No  frequency: No  headaches: No  hematochezia: Yes  melena: Yes  myalgias: Yes  nausea:  No  weight loss: No  vomiting: No  Aggravated by: deep breathing  Relieved by: standing

## 2022-12-06 NOTE — PATIENT INSTRUCTIONS
Scheduled date of colonoscopy (as of today): 2/14/23  Physician performing colonoscopy: Toby  Location of colonoscopy: [facility]  Bowel prep reviewed with patient: Miralax  Instructions reviewed with patient by: Karli BARBA  Clearances:

## 2022-12-12 DIAGNOSIS — M48.02 CERVICAL SPINAL STENOSIS: ICD-10-CM

## 2022-12-12 RX ORDER — HYDROCODONE BITARTRATE AND ACETAMINOPHEN 5; 325 MG/1; MG/1
2 TABLET ORAL EVERY 6 HOURS PRN
Qty: 100 TABLET | Refills: 0 | Status: SHIPPED | OUTPATIENT
Start: 2022-12-12

## 2022-12-28 ENCOUNTER — OFFICE VISIT (OUTPATIENT)
Dept: INTERNAL MEDICINE CLINIC | Facility: CLINIC | Age: 77
End: 2022-12-28

## 2022-12-28 VITALS
SYSTOLIC BLOOD PRESSURE: 142 MMHG | HEART RATE: 47 BPM | RESPIRATION RATE: 18 BRPM | WEIGHT: 193.8 LBS | HEIGHT: 67 IN | TEMPERATURE: 97.4 F | BODY MASS INDEX: 30.42 KG/M2 | DIASTOLIC BLOOD PRESSURE: 76 MMHG | OXYGEN SATURATION: 98 %

## 2022-12-28 DIAGNOSIS — N18.30 STAGE 3 CHRONIC KIDNEY DISEASE, UNSPECIFIED WHETHER STAGE 3A OR 3B CKD (HCC): Primary | ICD-10-CM

## 2022-12-28 DIAGNOSIS — I10 ESSENTIAL HYPERTENSION: ICD-10-CM

## 2022-12-28 DIAGNOSIS — I65.23 BILATERAL CAROTID ARTERY STENOSIS: ICD-10-CM

## 2022-12-28 DIAGNOSIS — M48.02 CERVICAL SPINAL STENOSIS: ICD-10-CM

## 2022-12-28 DIAGNOSIS — Z12.5 PROSTATE CANCER SCREENING: ICD-10-CM

## 2022-12-28 RX ORDER — HYDROCODONE BITARTRATE AND ACETAMINOPHEN 5; 325 MG/1; MG/1
2 TABLET ORAL EVERY 6 HOURS PRN
Qty: 100 TABLET | Refills: 0 | Status: SHIPPED | OUTPATIENT
Start: 2022-12-28

## 2022-12-28 NOTE — PATIENT INSTRUCTIONS
77, chronic diagnoses noted but controlled and in fairly good shape  Laboratory testing to be repeated  Colonoscopy as scheduled  Should recheck carotids  6-month follow-up

## 2022-12-28 NOTE — PROGRESS NOTES
Assessment/Plan:       Diagnoses and all orders for this visit:    Stage 3 chronic kidney disease, unspecified whether stage 3a or 3b CKD ([facility] [address] )  -     Lipid Panel with Direct LDL reflex; Future  -     CBC and differential; Future  -     Comprehensive metabolic panel; Future  -     TSH, 3rd generation with Free T4 reflex; Future    Cervical spinal stenosis  -     HYDROcodone-acetaminophen (NORCO) 5-325 mg per tablet; Take 2 tablets by mouth every 6 (six) hours as needed for pain Max Daily Amount: 8 tablets    Prostate cancer screening  -     PSA, Total Screen; Future                Subjective:      Patient ID: Brayan Booth is a 65 y o  male  Blood pressure is treated   Hyperlipidemia is treated  He has BPH diagnosis but his PSAs have been low and he has minimal urinary symptoms  Asbestos exposure but no symptoms    Occasionally gets intertrigo in the groin  Left facial paresthesia     Last year he had reported some paresthesias of the left lower face but this seems to have resolved on its own  Some recent episodes of rectal bleeding and he has a colonoscopy scheduled  Chronic bicipital rupture right side  Is quite visible but he has no functional deficit  Spinal stenosis currently taking hydrocodone with good relief  Lumbar pain with bilateral neurogenic claudication and some weakness  This is treated with chronic narcotic pain medication and the patient has been using these drugs responsibly but he needs to do the chronic narcotic intake now  When examined in December he had bilateral carotid bruits  Subsequently echocardiogram was done which is normal and carotid studies were done which showed bilateral less than 50% stenosis  This needs lipid therapy, and yearly follow-up  He is on atorvastatin 10 mg and while that is a low-dose fortunately his LDL is only 71 so this appears to be adequate  Smokes a 3rd to half a pack a day  Refuses to do the  CT screen        Finally, recent BMP was done in estimated GFR is 58 mL/minute down from prior  Further assessment needed here and I will send the patient for the measured creatinine clearance              The following portions of the patient's history were reviewed and updated as appropriate:   He has a past medical history of Abnormal EKG, Asbestosis ([address] ), Bradycardia, Clotting disorder (HCC) (3 days), History of sinus bradycardia, Hyperlipidemia, Hypertension, Prostate disorder, Shingles, and Spinal stenosis  ,  does not have any pertinent problems on file  ,   has a past surgical history that includes Appendectomy; Tonsillectomy; Neuroplasty / transposition median nerve at carpal tunnel; Quadriceps tendon repair (Right); and Patellar tendon repair (2019)  ,  family history includes Colon cancer in his father; Diabetes in his father; Heart disease in his father; Hypertension in his father; Stroke in his father; Transient ischemic attack in his father  ,   reports that he quit smoking about 3 months ago  His smoking use included cigarettes  He has never used smokeless tobacco  He reports that he does not currently use alcohol after a past usage of about 1 0 standard drink per week  He reports that he does not use drugs  ,  has No Known Allergies     Current Outpatient Medications   Medication Sig Dispense Refill   • aspirin 81 mg chewable tablet Chew 81 mg daily     • atorvastatin (LIPITOR) 10 mg tablet Take 1 tablet (10 mg total) by mouth daily 90 tablet 3   • fluticasone (FLONASE) 50 mcg/act nasal spray 1 spray into each nostril daily 48 mL 0   • HYDROcodone-acetaminophen (NORCO) 5-325 mg per tablet Take 2 tablets by mouth every 6 (six) hours as needed for pain Max Daily Amount: 8 tablets 100 tablet 0   • omeprazole (PriLOSEC) 20 mg delayed release capsule Take 1 capsule (20 mg total) by mouth daily (Patient taking differently: Take 20 mg by mouth if needed) 90 capsule 1     No current facility-administered medications for this visit         Review of Systems   Gastrointestinal: Positive for anal bleeding  Musculoskeletal: Positive for arthralgias and back pain  All other systems reviewed and are negative  Objective:  Vitals:    12/28/22 0753   BP: 142/76   Pulse: (!) 47   Resp: 18   Temp: (!) 97 4 °F (36 3 °C)   SpO2: 98%      Physical Exam  Constitutional:       Appearance: Normal appearance  He is not ill-appearing or diaphoretic  Eyes:      General: No scleral icterus  Neck:      Vascular: Carotid bruit present  Comments:   Bilateral low-pitched "bruit "heard in the neck  Right a bit greater than left  There is a heart murmur heard best right sternal border intercostal space 2  and I suspected bruits may be radiation  Cardiovascular:      Rate and Rhythm: Normal rate and regular rhythm  Heart sounds: Murmur heard  Pulmonary:      Effort: Pulmonary effort is normal       Breath sounds: Normal breath sounds  Abdominal:      General: Abdomen is flat  Neurological:      General: No focal deficit present  Mental Status: He is alert  Mental status is at baseline  Psychiatric:         Mood and Affect: Mood normal            There are no Patient Instructions on file for this visit

## 2023-01-10 ENCOUNTER — HOSPITAL ENCOUNTER (OUTPATIENT)
Dept: NON INVASIVE DIAGNOSTICS | Facility: CLINIC | Age: 78
Discharge: HOME | End: 2023-01-10

## 2023-01-10 ENCOUNTER — APPOINTMENT (OUTPATIENT)
Dept: LAB | Facility: CLINIC | Age: 78
End: 2023-01-10

## 2023-01-10 DIAGNOSIS — K92.1 HEMATOCHEZIA: ICD-10-CM

## 2023-01-10 DIAGNOSIS — N18.30 STAGE 3 CHRONIC KIDNEY DISEASE, UNSPECIFIED WHETHER STAGE 3A OR 3B CKD (HCC): ICD-10-CM

## 2023-01-10 DIAGNOSIS — R10.32 LEFT LOWER QUADRANT ABDOMINAL PAIN: ICD-10-CM

## 2023-01-10 DIAGNOSIS — Z12.5 PROSTATE CANCER SCREENING: ICD-10-CM

## 2023-01-10 LAB
ALBUMIN SERPL BCP-MCNC: 3.8 G/DL (ref 3.5–5)
ALP SERPL-CCNC: 33 U/L (ref 46–116)
ALT SERPL W P-5'-P-CCNC: 25 U/L (ref 12–78)
ANION GAP SERPL CALCULATED.3IONS-SCNC: 2 MMOL/L (ref 4–13)
AST SERPL W P-5'-P-CCNC: 18 U/L (ref 5–45)
BASOPHILS # BLD AUTO: 0.04 THOUSANDS/ÂΜL (ref 0–0.1)
BASOPHILS NFR BLD AUTO: 1 % (ref 0–1)
BILIRUB SERPL-MCNC: 0.69 MG/DL (ref 0.2–1)
BUN SERPL-MCNC: 17 MG/DL (ref 5–25)
CALCIUM SERPL-MCNC: 9 MG/DL (ref 8.3–10.1)
CHLORIDE SERPL-SCNC: 108 MMOL/L (ref 96–108)
CHOLEST SERPL-MCNC: 146 MG/DL
CO2 SERPL-SCNC: 30 MMOL/L (ref 21–32)
CREAT SERPL-MCNC: 1.13 MG/DL (ref 0.6–1.3)
EOSINOPHIL # BLD AUTO: 0.12 THOUSAND/ÂΜL (ref 0–0.61)
EOSINOPHIL NFR BLD AUTO: 2 % (ref 0–6)
ERYTHROCYTE [DISTWIDTH] IN BLOOD BY AUTOMATED COUNT: 12.7 % (ref 11.6–15.1)
GFR SERPL CREATININE-BSD FRML MDRD: 61 ML/MIN/1.73SQ M
GLUCOSE P FAST SERPL-MCNC: 104 MG/DL (ref 65–99)
HCT VFR BLD AUTO: 42.7 % (ref 36.5–49.3)
HDLC SERPL-MCNC: 50 MG/DL
HGB BLD-MCNC: 14.1 G/DL (ref 12–17)
IMM GRANULOCYTES # BLD AUTO: 0.02 THOUSAND/UL (ref 0–0.2)
IMM GRANULOCYTES NFR BLD AUTO: 0 % (ref 0–2)
LDLC SERPL CALC-MCNC: 74 MG/DL (ref 0–100)
LYMPHOCYTES # BLD AUTO: 1.36 THOUSANDS/ÂΜL (ref 0.6–4.47)
LYMPHOCYTES NFR BLD AUTO: 23 % (ref 14–44)
MCH RBC QN AUTO: 30.8 PG (ref 26.8–34.3)
MCHC RBC AUTO-ENTMCNC: 33 G/DL (ref 31.4–37.4)
MCV RBC AUTO: 93 FL (ref 82–98)
MONOCYTES # BLD AUTO: 0.62 THOUSAND/ÂΜL (ref 0.17–1.22)
MONOCYTES NFR BLD AUTO: 11 % (ref 4–12)
NEUTROPHILS # BLD AUTO: 3.76 THOUSANDS/ÂΜL (ref 1.85–7.62)
NEUTS SEG NFR BLD AUTO: 63 % (ref 43–75)
NRBC BLD AUTO-RTO: 0 /100 WBCS
PLATELET # BLD AUTO: 219 THOUSANDS/UL (ref 149–390)
PMV BLD AUTO: 9.7 FL (ref 8.9–12.7)
POTASSIUM SERPL-SCNC: 4.7 MMOL/L (ref 3.5–5.3)
PROT SERPL-MCNC: 7.4 G/DL (ref 6.4–8.4)
PSA SERPL-MCNC: 0.5 NG/ML (ref 0–4)
RBC # BLD AUTO: 4.58 MILLION/UL (ref 3.88–5.62)
SODIUM SERPL-SCNC: 140 MMOL/L (ref 135–147)
TRIGL SERPL-MCNC: 110 MG/DL
TSH SERPL DL<=0.05 MIU/L-ACNC: 2.16 UIU/ML (ref 0.45–4.5)
WBC # BLD AUTO: 5.92 THOUSAND/UL (ref 4.31–10.16)

## 2023-01-15 DIAGNOSIS — M48.02 CERVICAL SPINAL STENOSIS: ICD-10-CM

## 2023-01-16 RX ORDER — HYDROCODONE BITARTRATE AND ACETAMINOPHEN 5; 325 MG/1; MG/1
2 TABLET ORAL EVERY 6 HOURS PRN
Qty: 100 TABLET | Refills: 0 | Status: SHIPPED | OUTPATIENT
Start: 2023-01-16

## 2023-01-23 ENCOUNTER — HOSPITAL ENCOUNTER (OUTPATIENT)
Dept: NON INVASIVE DIAGNOSTICS | Facility: CLINIC | Age: 78
Discharge: HOME | End: 2023-01-23

## 2023-01-23 DIAGNOSIS — I65.23 BILATERAL CAROTID ARTERY STENOSIS: ICD-10-CM

## 2023-02-01 DIAGNOSIS — M48.02 CERVICAL SPINAL STENOSIS: ICD-10-CM

## 2023-02-01 RX ORDER — HYDROCODONE BITARTRATE AND ACETAMINOPHEN 5; 325 MG/1; MG/1
2 TABLET ORAL EVERY 6 HOURS PRN
Qty: 100 TABLET | Refills: 0 | Status: SHIPPED | OUTPATIENT
Start: 2023-02-01

## 2023-02-14 ENCOUNTER — HOSPITAL ENCOUNTER (OUTPATIENT)
Dept: GASTROENTEROLOGY | Facility: HOSPITAL | Age: 78
Setting detail: OUTPATIENT SURGERY
Discharge: HOME | End: 2023-02-14
Attending: INTERNAL MEDICINE

## 2023-02-14 ENCOUNTER — ANESTHESIA (OUTPATIENT)
Dept: GASTROENTEROLOGY | Facility: HOSPITAL | Age: 78
End: 2023-02-14

## 2023-02-14 ENCOUNTER — ANESTHESIA EVENT (OUTPATIENT)
Dept: GASTROENTEROLOGY | Facility: HOSPITAL | Age: 78
End: 2023-02-14

## 2023-02-14 VITALS
SYSTOLIC BLOOD PRESSURE: 137 MMHG | HEIGHT: 67 IN | WEIGHT: 188.93 LBS | TEMPERATURE: 97.7 F | DIASTOLIC BLOOD PRESSURE: 75 MMHG | RESPIRATION RATE: 18 BRPM | HEART RATE: 60 BPM | BODY MASS INDEX: 29.65 KG/M2 | OXYGEN SATURATION: 96 %

## 2023-02-14 DIAGNOSIS — R10.32 LEFT LOWER QUADRANT ABDOMINAL PAIN: ICD-10-CM

## 2023-02-14 DIAGNOSIS — K92.1 HEMATOCHEZIA: ICD-10-CM

## 2023-02-14 DIAGNOSIS — Z86.010 HISTORY OF COLON POLYPS: ICD-10-CM

## 2023-02-14 RX ORDER — LIDOCAINE HYDROCHLORIDE 20 MG/ML
INJECTION, SOLUTION EPIDURAL; INFILTRATION; INTRACAUDAL; PERINEURAL AS NEEDED
Status: DISCONTINUED | OUTPATIENT
Start: 2023-02-14 | End: 2023-02-14

## 2023-02-14 RX ORDER — PROPOFOL 10 MG/ML
INJECTION, EMULSION INTRAVENOUS AS NEEDED
Status: DISCONTINUED | OUTPATIENT
Start: 2023-02-14 | End: 2023-02-14

## 2023-02-14 RX ADMIN — PROPOFOL 40 MG: 10 INJECTION, EMULSION INTRAVENOUS at 09:35

## 2023-02-14 RX ADMIN — PROPOFOL 80 MG: 10 INJECTION, EMULSION INTRAVENOUS at 09:33

## 2023-02-14 RX ADMIN — LIDOCAINE HYDROCHLORIDE 40 MG: 20 INJECTION, SOLUTION EPIDURAL; INFILTRATION; INTRACAUDAL; PERINEURAL at 09:33

## 2023-02-14 RX ADMIN — PROPOFOL 40 MG: 10 INJECTION, EMULSION INTRAVENOUS at 09:40

## 2023-02-14 RX ADMIN — PROPOFOL 40 MG: 10 INJECTION, EMULSION INTRAVENOUS at 09:42

## 2023-02-14 RX ADMIN — PROPOFOL 40 MG: 10 INJECTION, EMULSION INTRAVENOUS at 09:37

## 2023-02-14 NOTE — H&P
History and Physical - [facility]  Marie Daniels Prime 65 y o  male MRN: [id number]                  HPI: Christopher Reilly is a 65y o  year old male who presents for colonoscopy for left lower quadrant pain, hematochezia, history of colon polyps  Last colonoscopy 4 years ago      REVIEW OF SYSTEMS: Per the HPI, and otherwise unremarkable  Historical Information   Past Medical History:   Diagnosis Date   • Abnormal EKG    • Asbestosis ([address] )     L A  7/23/15   R    7/23/15     • Bradycardia    • Clotting disorder (HCC) 3 days   • History of sinus bradycardia    • Hyperlipidemia    • Hypertension    • Prostate disorder    • Shingles    • Spinal stenosis      Past Surgical History:   Procedure Laterality Date   • APPENDECTOMY     • NEUROPLASTY / TRANSPOSITION MEDIAN NERVE AT CARPAL TUNNEL      Decompression    • PATELLAR TENDON REPAIR  2019   • QUADRICEPS TENDON REPAIR Right    • TONSILLECTOMY       Social History   Social History     Substance and Sexual Activity   Alcohol Use Not Currently   • Alcohol/week: 1 0 standard drink   • Types: 1 Cans of beer per week     Social History     Substance and Sexual Activity   Drug Use No     Social History     Tobacco Use   Smoking Status Former   • Packs/day: 0 00   • Years: 50 00   • Pack years: 0 00   • Types: Cigarettes   • Quit date: 2022   • Years since quittin 4   Smokeless Tobacco Never     Family History   Problem Relation Age of Onset   • Transient ischemic attack Father    • Colon cancer Father    • Heart disease Father         cardiac disorder   • Stroke Father    • Diabetes Father    • Hypertension Father        Meds/Allergies     (Not in a hospital admission)      No Known Allergies    Objective     There were no vitals taken for this visit        PHYSICAL EXAM    Gen: NAD  CV: RRR  CHEST: Clear  ABD: soft, NT/ND  EXT: no edema  Neuro: AAO      ASSESSMENT/PLAN:  This is a 65y o  year old male here for left lower quadrant pain, hematochezia, history of polyps    PLAN:   Procedure: Colonoscopy

## 2023-02-14 NOTE — ANESTHESIA POSTPROCEDURE EVALUATION
Post-Op Assessment Note    CV Status:  Stable    Pain management: adequate     Mental Status:  Alert and awake   Hydration Status:  Euvolemic   PONV Controlled:  Controlled   Airway Patency:  Patent      Post Op Vitals Reviewed: Yes      Staff: CRNA         No notable events documented      /69 (02/14/23 0952)    Temp 97 7 °F (36 5 °C) (02/14/23 0952)    Pulse 70 (02/14/23 0952)   Resp 18 (02/14/23 0952)    SpO2 94 % (02/14/23 0952)

## 2023-02-14 NOTE — INTERVAL H&P NOTE
H&P reviewed  After examining the patient I find no changes in the patients condition since the H&P had been written      Vitals:    02/14/23 0830   BP: 151/77   Pulse: 60   Resp: 18   Temp: 98 °F (36 7 °C)   SpO2: 96%

## 2023-02-14 NOTE — ANESTHESIA PREPROCEDURE EVALUATION
Procedure:  COLONOSCOPY    Relevant Problems   CARDIO   (+) Essential hypertension   (+) Hyperlipidemia      /RENAL   (+) Prostate disorder   (+) Stage 3 chronic kidney disease, unspecified whether stage 3a or 3b CKD (HCC)      NEURO/PSYCH   (+) Chronic pain of both shoulders   (+) History of sinus bradycardia      Other   (+) Anterior cervical adenopathy         •  Left Ventricle: Left ventricular cavity size is normal  Wall thickness    is normal  Systolic function is normal (60%)  Wall motion is normal     Diastolic function is normal    •  Right Ventricle: Right ventricular cavity size is normal  Systolic    function is normal    •  Aortic Valve: There is trace regurgitation  •  Mitral Valve: There is mild annular calcification  There is trace    regurgitation  •  Tricuspid Valve: There is trace regurgitation  The right ventricular    systolic pressure is normal    •  Pulmonic Valve: There is trace regurgitation  Physical Exam    Airway    Mallampati score: II  TM Distance: >3 FB  Neck ROM: full     Dental       Cardiovascular  Cardiovascular exam normal    Pulmonary  Pulmonary exam normal     Other Findings        Anesthesia Plan  ASA Score- 2     Anesthesia Type- IV sedation with anesthesia with ASA Monitors  Additional Monitors:   Airway Plan:           Plan Factors-Exercise tolerance (METS): >4 METS  Chart reviewed  EKG reviewed  Imaging results reviewed  Existing labs reviewed  Patient summary reviewed  Induction- intravenous  Postoperative Plan-     Informed Consent- Anesthetic plan and risks discussed with patient  I personally reviewed this patient with the CRNA  Discussed and agreed on the Anesthesia Plan with the CRNA  Hadley Martin

## 2023-02-21 DIAGNOSIS — M48.02 CERVICAL SPINAL STENOSIS: ICD-10-CM

## 2023-02-21 RX ORDER — HYDROCODONE BITARTRATE AND ACETAMINOPHEN 5; 325 MG/1; MG/1
2 TABLET ORAL EVERY 6 HOURS PRN
Qty: 100 TABLET | Refills: 0 | Status: SHIPPED | OUTPATIENT
Start: 2023-02-21 | End: 2023-02-28 | Stop reason: SDUPTHER

## 2023-02-28 DIAGNOSIS — M48.02 CERVICAL SPINAL STENOSIS: ICD-10-CM

## 2023-02-28 RX ORDER — HYDROCODONE BITARTRATE AND ACETAMINOPHEN 5; 325 MG/1; MG/1
2 TABLET ORAL EVERY 6 HOURS PRN
Qty: 100 TABLET | Refills: 0 | Status: SHIPPED | OUTPATIENT
Start: 2023-02-28 | End: 2023-02-28

## 2023-02-28 RX ORDER — HYDROCODONE BITARTRATE AND ACETAMINOPHEN 5; 325 MG/1; MG/1
2 TABLET ORAL EVERY 6 HOURS PRN
Qty: 100 TABLET | Refills: 0 | Status: SHIPPED | OUTPATIENT
Start: 2023-02-28

## 2023-02-28 NOTE — TELEPHONE ENCOUNTER
called pt   And was notified and stated he was at [facility] this morning and they can not get the 5's asking for it to be sent to [facility] pharmacy

## 2023-03-02 ENCOUNTER — NURSE TRIAGE (OUTPATIENT)
Dept: OTHER | Facility: OTHER | Age: 78
End: 2023-03-02

## 2023-03-02 NOTE — TELEPHONE ENCOUNTER
Regarding: Cramping gas nausea  ----- Message from [employer] sent at 3/2/2023  6:00 PM EST -----  "I had a colonoscopy last month and I still have cramps, gas, and nausea "

## 2023-03-02 NOTE — TELEPHONE ENCOUNTER
Reason for Disposition  • [1] MILD pain (e g , does not interfere with normal activities) AND [2] pain comes and goes (cramps) [3] present > 48 hours  (Exception: this same abdominal pain is a chronic symptom recurrent or ongoing AND present > 4 weeks)  • Age > 60 years    Additional Information  • Negative: [1] MODERATE pain (e g , interferes with normal activities) AND [2] pain comes and goes (cramps) AND [3] present > 24 hours  (Exception: pain with Vomiting or Diarrhea - see that Guideline)    Answer Assessment - Initial Assessment Questions  1  LOCATION: "Where does it hurt?"       Lower abdominal  2  RADIATION: "Does the pain shoot anywhere else?" (e g , chest, back)      no  3  ONSET: "When did the pain begin?" (Minutes, hours or days ago)       Two weeks  4  SUDDEN: "Gradual or sudden onset?"        5  PATTERN "Does the pain come and go, or is it constant?"     Intermittent cramping that is relieved with passing stool    6  SEVERITY: "How bad is the pain?"  (e g , Scale 1-10; mild, moderate, or severe)     - MILD (1-3): doesn't interfere with normal activities, abdomen soft and not tender to touch      - MODERATE (4-7): interferes with normal activities or awakens from sleep, tender to touch      - SEVERE (8-10): excruciating pain, doubled over, unable to do any normal activities        Mild cramping   7  RECURRENT SYMPTOM: "Have you ever had this type of stomach pain before?" If Yes, ask: "When was the last time?" and "What happened that time?"       no  8  CAUSE: "What do you think is causing the stomach pain?"      unsure  9  RELIEVING/AGGRAVATING FACTORS: "What makes it better or worse?" (e g , movement, antacids, bowel movement)      Passing a stool makes it better  10  OTHER SYMPTOMS: "Has there been any vomiting, diarrhea, constipation, or urine problems?"        Constipation due to pain medication, mild nausea and intermittent cramping with bloating and gas      Protocols used: ABDOMINAL PAIN - MALE-ADULT-AH

## 2023-03-03 DIAGNOSIS — R10.32 LEFT LOWER QUADRANT ABDOMINAL PAIN: Primary | ICD-10-CM

## 2023-03-03 RX ORDER — DICYCLOMINE HCL 20 MG
20 TABLET ORAL 3 TIMES DAILY
Qty: 30 TABLET | Refills: 5 | Status: SHIPPED | OUTPATIENT
Start: 2023-03-03

## 2023-03-03 NOTE — TELEPHONE ENCOUNTER
Please tell him to begin taking MiraLAX 1 capful in 8 ounces of water every night  Also please call him a prescription for dicyclomine, 20 mg p o  3 times daily dispense 30 with 5 refills  Tell him to call me in 3 weeks with his progress and then we will determine if I need to see him in the office

## 2023-03-17 ENCOUNTER — OFFICE VISIT (OUTPATIENT)
Dept: GASTROENTEROLOGY | Facility: CLINIC | Age: 78
End: 2023-03-17

## 2023-03-17 VITALS
HEIGHT: 67 IN | OXYGEN SATURATION: 97 % | HEART RATE: 49 BPM | RESPIRATION RATE: 18 BRPM | DIASTOLIC BLOOD PRESSURE: 78 MMHG | SYSTOLIC BLOOD PRESSURE: 140 MMHG | WEIGHT: 196.4 LBS | BODY MASS INDEX: 30.83 KG/M2

## 2023-03-17 DIAGNOSIS — R14.2 BELCHING: Primary | ICD-10-CM

## 2023-03-17 DIAGNOSIS — R10.30 LOWER ABDOMINAL PAIN: ICD-10-CM

## 2023-03-17 NOTE — PROGRESS NOTES
[facility]      Chief Complaint: Abdominal pain    HPI:  Mis Minor is a 65 y o   male who presents with post colonoscopy pain and nausea  The patient responded extremely well to dicyclomine  He is currently having only mild belching  This is actually better also since he started his Prilosec again  He is feeling quite well and walking 2 miles a day  He is having absolutely no other GI symptomatology at the present time         Review of Systems:   Constitutional: No fever or chills, feels well, no tiredness, no recent weight gain or weight loss  HENT: No complaints of earache, no hearing loss, no nosebleeds, no nasal discharge, no sore throat, no hoarseness  Eyes: No complaints of eye pain, no red eyes, no discharge from eyes, no itchy eyes  Cardiovascular: No complaints of slow heart rate, no fast heart rate, no chest pain, no palpitations, no leg claudication, no lower extremity edema  Respiratory: No complaints of shortness of breath, no wheezing, no cough, no SOB on exertion, no orthopnea  Gastrointestinal: As noted in HPI  Genitourinary: No complaints of dysuria, no incontinence, no hesitancy, no nocturia  Musculoskeletal: No complaints of arthralgia, no myalgias, no joint swelling or stiffness, no limb pain or swelling  Neurological: No complaints of headache, no confusion, no convulsions, no numbness or tingling, no dizziness or fainting, no limb weakness, no difficulty walking  Skin: No complaints of skin rash or skin lesions, no itching, no skin wound, no dry skin  Hematological/Lymphatic: No complaints of swollen glands, does not bleed easy  Allergic/Immunologic: No immunocompromised state  Endocrine:  No complaints of polyuria, no polydipsia  Psychiatric/Behavioral: is not suicidal, no sleep disturbances, no anxiety or depression, no change in personality, no emotional problems         Historical Information   Past Medical History:   Diagnosis Date   • Abnormal EKG    • Asbestosis ([facility] [address] )     L A  7/23/15   R    7/23/15     • Bradycardia    • Chronic pain disorder     due to spinal stenosis   • Clotting disorder (HCC) 3 days   • History of sinus bradycardia    • Hyperlipidemia    • Hypertension    • Prostate disorder    • Shingles    • Spinal stenosis      Past Surgical History:   Procedure Laterality Date   • APPENDECTOMY     • NEUROPLASTY / TRANSPOSITION MEDIAN NERVE AT CARPAL TUNNEL      Decompression    • PATELLAR TENDON REPAIR  2019   • QUADRICEPS TENDON REPAIR Right    • TONSILLECTOMY       Social History   Social History     Substance and Sexual Activity   Alcohol Use Not Currently   • Alcohol/week: 1 0 standard drink   • Types: 1 Cans of beer per week     Social History     Substance and Sexual Activity   Drug Use No     Social History     Tobacco Use   Smoking Status Former   • Packs/day: 0 00   • Years: 50 00   • Pack years: 0 00   • Types: Cigarettes   • Quit date: 2022   • Years since quittin 4   Smokeless Tobacco Never     Family History   Problem Relation Age of Onset   • Transient ischemic attack Father    • Colon cancer Father    • Heart disease Father         cardiac disorder   • Stroke Father    • Diabetes Father    • Hypertension Father          Current Medications: has a current medication list which includes the following prescription(s): atorvastatin, fluticasone, hydrocodone-acetaminophen, omeprazole, aspirin, and dicyclomine  Vital Signs: /78   Pulse (!) 49   Resp 18   Ht 5' 7" (1 702 m)   Wt 89 1 kg (196 lb 6 4 oz)   SpO2 97%   BMI 30 76 kg/m²       Physical Exam:   Constitutional  General Appearance: No acute distress, well appearing and well nourished  Head  Normocephalic  Eyes  Conjunctivae and lids: No swelling, erythema, or discharge  Pupils and irises: Equal, round and reactive to light     Ears, Nose, Mouth, and Throat  External inspection of ears and nose: Normal  Nasal mucosa, septum and turbinates: Normal without edema or erythema/   Oropharynx: Normal with no erythema, edema, exudate or lesions  Neck  Normal range of motion  Neck supple  Cardiovascular  Auscultation of the heart: Normal rate and rhythm, normal S1 and S2 without murmurs  Examination of the extremities for edema and/or varicosities: Normal  Pulmonary/Chest  Respiratory effort: No increased work of breathing or signs of respiratory distress  Auscultation of lungs: Clear to auscultation, equal breath sounds bilaterally, no wheezes, rales, no rhonchi  Abdomen  Abdomen: Non-tender, no masses  Liver and spleen: No hepatomegaly or splenomegaly  Musculoskeletal  Gait and station: normal   Digits and Nails: normal without clubbing or cyanosis  Inspection/palpation of joints, bones, and muscles: Normal  Neurological  No nystagmus or asterixis  Skin  Skin and subcutaneous tissue: Normal without rashes or lesions  Lymphatic  Palpation of the lymph nodes in neck: No lymphadenopathy  Psychiatric  Orientation to person, place and time: Normal   Mood and affect: Normal          Labs:  Lab Results   Component Value Date    ALT 25 01/10/2023    AST 18 01/10/2023    BUN 17 01/10/2023    CALCIUM 9 0 01/10/2023     01/10/2023    CO2 30 01/10/2023    CREATININE 1 13 01/10/2023    HDL 50 01/10/2023    HCT 42 7 01/10/2023    HGB 14 1 01/10/2023     01/10/2023    K 4 7 01/10/2023    PSA 0 5 01/10/2023    TRIG 110 01/10/2023    WBC 5 92 01/10/2023         X-Rays & Procedures:   No orders to display           ______________________________________________________________________      Assessment & Plan:     Diagnoses and all orders for this visit:    Belching    Lower abdominal pain      Patient is now feeling very good except for some belching  I gave him a copy of a FODMAP diet  The dicyclomine really got rid of his problem completely  He just started back on his Prilosec and is feeling much better now    He is walking 2 martha a day    He will call me as he needs

## 2023-03-21 DIAGNOSIS — M48.02 CERVICAL SPINAL STENOSIS: ICD-10-CM

## 2023-03-23 ENCOUNTER — RA CDI HCC (OUTPATIENT)
Dept: OTHER | Facility: HOSPITAL | Age: 78
End: 2023-03-23

## 2023-03-23 NOTE — PROGRESS NOTES
[facility]  coding opportunities       Chart reviewed, no opportunity found:   [address]        Patients Insurance     Medicare Insurance: [employer]

## 2023-03-28 RX ORDER — HYDROCODONE BITARTRATE AND ACETAMINOPHEN 5; 325 MG/1; MG/1
2 TABLET ORAL EVERY 6 HOURS PRN
Qty: 100 TABLET | Refills: 0 | Status: SHIPPED | OUTPATIENT
Start: 2023-03-28

## 2023-03-29 ENCOUNTER — OFFICE VISIT (OUTPATIENT)
Dept: INTERNAL MEDICINE CLINIC | Facility: CLINIC | Age: 78
End: 2023-03-29

## 2023-03-29 VITALS
HEIGHT: 67 IN | OXYGEN SATURATION: 98 % | TEMPERATURE: 97.3 F | RESPIRATION RATE: 20 BRPM | DIASTOLIC BLOOD PRESSURE: 70 MMHG | SYSTOLIC BLOOD PRESSURE: 178 MMHG | WEIGHT: 195 LBS | BODY MASS INDEX: 30.61 KG/M2 | HEART RATE: 50 BPM

## 2023-03-29 DIAGNOSIS — M48.061 DEGENERATIVE LUMBAR SPINAL STENOSIS: Primary | ICD-10-CM

## 2023-03-29 RX ORDER — GABAPENTIN 300 MG/1
300 CAPSULE ORAL 3 TIMES DAILY
Qty: 90 CAPSULE | Refills: 3 | Status: SHIPPED | OUTPATIENT
Start: 2023-03-29

## 2023-03-29 NOTE — PROGRESS NOTES
Assessment/Plan:       Diagnoses and all orders for this visit:    Degenerative lumbar spinal stenosis  -     gabapentin (Neurontin) 300 mg capsule; Take 1 capsule (300 mg total) by mouth 3 (three) times a day                Subjective:      Patient ID: Hailee Willis is a 65 y o  male  Patient is chief complaint today is ongoing lumbar radiculopathic pain  He takes a fairly low-dose of narcotic pain medicine and has decent pain relief in the daytime but he has severe pain at night which awakens him  We discussed this  We will try addition of gabapentin starting at 300 mg p o  at bedtime  Blood pressure is treated   Hyperlipidemia is treated  He has BPH diagnosis but his PSAs have been low and he has minimal urinary symptoms  Asbestos exposure but no symptoms    Occasionally gets intertrigo in the groin  Left facial paresthesia     Last year he had reported some paresthesias of the left lower face but this seems to have resolved on its own  Some recent episodes of rectal bleeding and he has a colonoscopy scheduled  Chronic bicipital rupture right side  Is quite visible but he has no functional deficit  Spinal stenosis currently taking hydrocodone with good relief  Lumbar pain with bilateral neurogenic claudication and some weakness  This is treated with chronic narcotic pain medication and the patient has been using these drugs responsibly but he needs to do the chronic narcotic intake now  When examined in December he had bilateral carotid bruits  Subsequently echocardiogram was done which is normal and carotid studies were done which showed bilateral less than 50% stenosis  This needs lipid therapy, and yearly follow-up  He is on atorvastatin 10 mg and while that is a low-dose fortunately his LDL is only 71 so this appears to be adequate  Smokes a 3rd to half a pack a day  Refuses to do the  CT screen        Finally, recent BMP was done in estimated GFR is 58 mL/minute down from prior  Further assessment needed here and I will send the patient for the measured creatinine clearance              The following portions of the patient's history were reviewed and updated as appropriate:   He has a past medical history of Abnormal EKG, Asbestosis ([address] ), Bradycardia, Chronic pain disorder, Clotting disorder (HCC) (3 days), History of sinus bradycardia, Hyperlipidemia, Hypertension, Prostate disorder, Shingles, and Spinal stenosis  ,  does not have any pertinent problems on file  ,   has a past surgical history that includes Appendectomy; Tonsillectomy; Neuroplasty / transposition median nerve at carpal tunnel; Quadriceps tendon repair (Right); and Patellar tendon repair (2019)  ,  family history includes Colon cancer in his father; Diabetes in his father; Heart disease in his father; Hypertension in his father; Stroke in his father; Transient ischemic attack in his father  ,   reports that he quit smoking about 6 months ago  His smoking use included cigarettes  He has never used smokeless tobacco  He reports that he does not currently use alcohol after a past usage of about 1 0 standard drink per week  He reports that he does not use drugs  ,  has No Known Allergies     Current Outpatient Medications   Medication Sig Dispense Refill   • aspirin 81 mg chewable tablet Chew 81 mg daily     • atorvastatin (LIPITOR) 10 mg tablet Take 1 tablet (10 mg total) by mouth daily 90 tablet 3   • fluticasone (FLONASE) 50 mcg/act nasal spray 1 spray into each nostril daily 48 mL 0   • gabapentin (Neurontin) 300 mg capsule Take 1 capsule (300 mg total) by mouth 3 (three) times a day 90 capsule 3   • HYDROcodone-acetaminophen (NORCO) 5-325 mg per tablet Take 2 tablets by mouth every 6 (six) hours as needed for pain Max Daily Amount: 8 tablets 100 tablet 0   • omeprazole (PriLOSEC) 20 mg delayed release capsule Take 1 capsule (20 mg total) by mouth daily 90 capsule 1     No current facility-administered medications for this visit  Review of Systems   Gastrointestinal: Positive for abdominal distention  Musculoskeletal: Positive for arthralgias and back pain  Objective:  Vitals:    03/29/23 1028   BP: (!) 178/70   Pulse: (!) 50   Resp: 20   Temp: (!) 97 3 °F (36 3 °C)   SpO2: 98%      Physical Exam  Constitutional:       Appearance: He is well-developed  Cardiovascular:      Heart sounds: Normal heart sounds  Pulmonary:      Effort: Pulmonary effort is normal    Abdominal:      General: Abdomen is flat  Neurological:      General: No focal deficit present  Mental Status: He is alert  Patient Instructions   Patient with lumbar radiculopathy this pain is particularly severe at night  He takes narcotic during the day with decent relief  Recommendation is to try addition of gabapentin at bedtime starting at 300 mg    We will move on from this initial step

## 2023-03-29 NOTE — PATIENT INSTRUCTIONS
Patient with lumbar radiculopathy this pain is particularly severe at night  He takes narcotic during the day with decent relief  Recommendation is to try addition of gabapentin at bedtime starting at 300 mg    We will move on from this initial step

## 2023-04-06 ENCOUNTER — TELEPHONE (OUTPATIENT)
Age: 78
End: 2023-04-06

## 2023-04-06 NOTE — TELEPHONE ENCOUNTER
I spoke to Jeromy as per tip prior authorization was done and approve for hydrocodone he stated that he is going to call pharmacy where medication was send to

## 2023-04-20 DIAGNOSIS — M48.02 CERVICAL SPINAL STENOSIS: ICD-10-CM

## 2023-04-24 RX ORDER — HYDROCODONE BITARTRATE AND ACETAMINOPHEN 5; 325 MG/1; MG/1
2 TABLET ORAL EVERY 6 HOURS PRN
Qty: 100 TABLET | Refills: 0 | Status: SHIPPED | OUTPATIENT
Start: 2023-04-24

## 2023-05-08 DIAGNOSIS — M48.02 CERVICAL SPINAL STENOSIS: ICD-10-CM

## 2023-05-09 RX ORDER — HYDROCODONE BITARTRATE AND ACETAMINOPHEN 5; 325 MG/1; MG/1
2 TABLET ORAL EVERY 6 HOURS PRN
Qty: 100 TABLET | Refills: 0 | Status: SHIPPED | OUTPATIENT
Start: 2023-05-09

## 2023-06-26 DIAGNOSIS — M48.02 CERVICAL SPINAL STENOSIS: ICD-10-CM

## 2023-06-26 RX ORDER — HYDROCODONE BITARTRATE AND ACETAMINOPHEN 5; 325 MG/1; MG/1
2 TABLET ORAL EVERY 6 HOURS PRN
Qty: 200 TABLET | Refills: 0 | Status: SHIPPED | OUTPATIENT
Start: 2023-06-26

## 2023-06-28 ENCOUNTER — TELEPHONE (OUTPATIENT)
Age: 78
End: 2023-06-28

## 2023-07-10 DIAGNOSIS — M48.02 CERVICAL SPINAL STENOSIS: ICD-10-CM

## 2023-07-10 RX ORDER — HYDROCODONE BITARTRATE AND ACETAMINOPHEN 5; 325 MG/1; MG/1
2 TABLET ORAL EVERY 6 HOURS PRN
Qty: 100 TABLET | Refills: 0 | Status: SHIPPED | OUTPATIENT
Start: 2023-07-10

## 2023-08-08 DIAGNOSIS — M48.02 CERVICAL SPINAL STENOSIS: ICD-10-CM

## 2023-08-08 DIAGNOSIS — J30.89 NON-SEASONAL ALLERGIC RHINITIS, UNSPECIFIED TRIGGER: ICD-10-CM

## 2023-08-08 DIAGNOSIS — E78.00 HIGH CHOLESTEROL: ICD-10-CM

## 2023-08-10 RX ORDER — FLUTICASONE PROPIONATE 50 MCG
1 SPRAY, SUSPENSION (ML) NASAL DAILY
Qty: 48 ML | Refills: 0 | Status: SHIPPED | OUTPATIENT
Start: 2023-08-10

## 2023-08-10 RX ORDER — HYDROCODONE BITARTRATE AND ACETAMINOPHEN 5; 325 MG/1; MG/1
2 TABLET ORAL EVERY 6 HOURS PRN
Qty: 100 TABLET | Refills: 0 | Status: SHIPPED | OUTPATIENT
Start: 2023-08-10

## 2023-08-10 RX ORDER — ATORVASTATIN CALCIUM 10 MG/1
10 TABLET, FILM COATED ORAL DAILY
Qty: 90 TABLET | Refills: 0 | Status: SHIPPED | OUTPATIENT
Start: 2023-08-10

## 2023-12-05 DIAGNOSIS — E78.00 HIGH CHOLESTEROL: ICD-10-CM

## 2023-12-05 RX ORDER — ATORVASTATIN CALCIUM 10 MG/1
10 TABLET, FILM COATED ORAL DAILY
Qty: 90 TABLET | Refills: 0 | Status: SHIPPED | OUTPATIENT
Start: 2023-12-05

## 2024-02-21 PROBLEM — Z12.11 COLON CANCER SCREENING: Status: RESOLVED | Noted: 2018-08-24 | Resolved: 2024-02-21

## 2025-01-20 ENCOUNTER — EVALUATION (OUTPATIENT)
Dept: PHYSICAL THERAPY | Facility: CLINIC | Age: 80
End: 2025-01-20
Payer: COMMERCIAL

## 2025-01-20 DIAGNOSIS — M51.362 DEGENERATION OF INTERVERTEBRAL DISC OF LUMBAR REGION WITH DISCOGENIC BACK PAIN AND LOWER EXTREMITY PAIN: ICD-10-CM

## 2025-01-20 DIAGNOSIS — M54.50 LOW BACK PAIN, UNSPECIFIED BACK PAIN LATERALITY, UNSPECIFIED CHRONICITY, UNSPECIFIED WHETHER SCIATICA PRESENT: Primary | ICD-10-CM

## 2025-01-20 PROCEDURE — 97110 THERAPEUTIC EXERCISES: CPT

## 2025-01-20 PROCEDURE — 97162 PT EVAL MOD COMPLEX 30 MIN: CPT

## 2025-01-20 NOTE — PROGRESS NOTES
PT Evaluation     Today's date: 2025  Patient name: Cole Winter  : 1945  MRN: [id number]  Referring provider: Rasheed Irwin MD  Dx:   Encounter Diagnosis     ICD-10-CM    1. Low back pain, unspecified back pain laterality, unspecified chronicity, unspecified whether sciatica present  M54.50       2. Degeneration of intervertebral disc of lumbar region with discogenic back pain and lower extremity pain  M51.362                      Assessment  Impairments: abnormal gait, abnormal or restricted ROM, activity intolerance, impaired physical strength, lacks appropriate home exercise program, pain with function, unable to perform ADL and activity limitations    Assessment details: Pt presents with chronic LBP.  Hx 5 injections L-spine prior.  Pain for past 20 years.  Reports symptoms worsening recently and now sleep disrupted.  Pain radiates to bilateral hip regions.  Intermittent tingling BLE.  Limited trunk ROM all planes due to pain.  Difficulty with ADL's.  Will benefit from PT tx to decrease symptoms and improve functional level.       Prognosis: fair    Goals  ST.  Decrease pain 25% 6 wk  2.  Increase trunk ROM to Mod/Min limitation  6 wk  3.  Increase trunk strength to Fair+ 6 wk  4.  Increase BLE strength to 4+/5 6 wk  LT.  Pt will report 50% decrease in pain 12 wk  2.  Increase trunk ROM to WFL/WNL 12 wk  3.  Increase trunk strength to WNL 12 wk  4.  Pt will report no limitations with ADL's 12 wk  5.  Pt will report no limitations with ambulation 12 wk      Plan  Patient would benefit from: skilled physical therapy and PT eval  Planned modality interventions: cryotherapy and thermotherapy: hydrocollator packs    Planned therapy interventions: abdominal trunk stabilization, joint mobilization, manual therapy, neuromuscular re-education, self care, strengthening, therapeutic activities, stretching, therapeutic exercise, functional ROM exercises, flexibility and home exercise  program    Frequency: 3x week  Duration in weeks: 12  Treatment plan discussed with: patient        Subjective Evaluation    History of Present Illness  Mechanism of injury: Pt presents with hx chronic LBP.  Pain for the past 20 years.  Reports recently increased pain.  Reports pain worse at night and sleep disrupted.   Pain to bilateral hip regions recently.  Has had 5 injections prior L-spine that made symptoms worse.  Intermittent tingling sensation BLE.  Laying on stomach and on back both make symptoms worse.   Increasing difficulty reaching to feet.    Patient Goals  Patient goals for therapy: decreased pain and return to sport/leisure activities    Pain  Current pain ratin  At best pain ratin  At worst pain ratin  Location: Lower Back, Bilateral Hip  Quality: dull ache and sharp  Relieving factors: medications  Exacerbated by: Laying down in bed.    Exercise history: Enjoys walking    Treatments  Previous treatment: injection treatment, medication and physical therapy  Current treatment: medication        Objective     Tenderness     Additional Tenderness Details  No TTP noted L-spine region    Neurological Testing     Sensation     Lumbar   Left   Intact: light touch    Right   Intact: light touch    Active Range of Motion     Lumbar   Flexion:  with pain Restriction level: moderate  Extension:  with pain Restriction level: maximal  Left lateral flexion:  with pain Restriction level: maximal  Right lateral flexion:  with pain Restriction level: moderate  Left rotation:  Restriction level: moderate  Right rotation:  Restriction level: moderate    Strength/Myotome Testing     Left Hip   Planes of Motion   Flexion: 4  Extension: 4  Abduction: 4  Adduction: 4    Right Hip   Planes of Motion   Flexion: 4  Extension: 4  Abduction: 4  Adduction: 4    Left Knee   Flexion: 4  Extension: 4    Right Knee   Flexion: 4  Extension: 4    Left Ankle/Foot   Dorsiflexion: 4  Plantar flexion: 4    Right Ankle/Foot    Dorsiflexion: 4  Plantar flexion: 4    Additional Strength Details  Pain in lower back with resisted hip flexion bilaterally     Tests     Lumbar   Positive SIJ compression.     Left   Positive passive SLR.     Right   Positive passive SLR.     Left Hip   Positive long sit.     Right Hip   Positive long sit.     Ambulation     Ambulation: Stairs   Ascend stairs: independent  Pattern: reciprocal  Railings: one rail  Descend stairs: independent  Pattern: reciprocal  Railings: one rail    Observational Gait   Gait: antalgic     Additional Observational Gait Details  Reports feeling unsteady at times  Can walk 2-3 miles            Precautions:  Chronic LBP, Hx Right Patella Tendon Repair       Manuals 1-20-25       Stretch BLE                                Neuro Re-Ed         PPT        Supine abd isometric         bridge        LTP/MTP (posture)                                Ther Ex        Nustep        Seated trunk flexion stretch w/ physioball        St hip 3 way        Hip hikes                                HEP Instructed and issued HEP  ([id number])       Ther Activity                        Gait Training                        Modalities        [facility] L-spine 10'

## 2025-01-20 NOTE — LETTER
2025    Rasheed Irwin MD  [address] [facility] [address]    Patient: Cole Winter   YOB: 1945   Date of Visit: 2025     Encounter Diagnosis     ICD-10-CM    1. Low back pain, unspecified back pain laterality, unspecified chronicity, unspecified whether sciatica present  M54.50       2. Degeneration of intervertebral disc of lumbar region with discogenic back pain and lower extremity pain  M51.362           Dear Dr. Irwin:    Thank you for your recent referral of Cole Winter. Please review the attached evaluation summary from Cole's recent visit.     Please verify that you agree with the plan of care by signing the attached order.     If you have any questions or concerns, please do not hesitate to call.     I sincerely appreciate the opportunity to share in the care of one of your patients and hope to have another opportunity to work with you in the near future.       Sincerely,    Cole Quigley, PT      Referring Provider:      I certify that I have read the below Plan of Care and certify the need for these services furnished under this plan of treatment while under my care.                    Rasheed Irwin MD  [address] [facility] [address]  Via Fax: [phone]          PT Evaluation     Today's date: 2025  Patient name: Cole Winter  : 1945  MRN: [id number]  Referring provider: Rasheed Irwin MD  Dx:   Encounter Diagnosis     ICD-10-CM    1. Low back pain, unspecified back pain laterality, unspecified chronicity, unspecified whether sciatica present  M54.50       2. Degeneration of intervertebral disc of lumbar region with discogenic back pain and lower extremity pain  M51.362                      Assessment  Impairments: abnormal gait, abnormal or restricted ROM, activity intolerance, impaired physical strength, lacks appropriate home exercise program, pain with function, unable to perform ADL and activity  limitations    Assessment details: Pt presents with chronic LBP.  Hx 5 injections L-spine prior.  Pain for past 20 years.  Reports symptoms worsening recently and now sleep disrupted.  Pain radiates to bilateral hip regions.  Intermittent tingling BLE.  Limited trunk ROM all planes due to pain.  Difficulty with ADL's.  Will benefit from PT tx to decrease symptoms and improve functional level.       Prognosis: fair    Goals  ST.  Decrease pain 25% 6 wk  2.  Increase trunk ROM to Mod/Min limitation  6 wk  3.  Increase trunk strength to Fair+ 6 wk  4.  Increase BLE strength to 4+/5 6 wk  LT.  Pt will report 50% decrease in pain 12 wk  2.  Increase trunk ROM to WFL/WNL 12 wk  3.  Increase trunk strength to WNL 12 wk  4.  Pt will report no limitations with ADL's 12 wk  5.  Pt will report no limitations with ambulation 12 wk      Plan  Patient would benefit from: skilled physical therapy and PT eval  Planned modality interventions: cryotherapy and thermotherapy: hydrocollator packs    Planned therapy interventions: abdominal trunk stabilization, joint mobilization, manual therapy, neuromuscular re-education, self care, strengthening, therapeutic activities, stretching, therapeutic exercise, functional ROM exercises, flexibility and home exercise program    Frequency: 3x week  Duration in weeks: 12  Treatment plan discussed with: patient        Subjective Evaluation    History of Present Illness  Mechanism of injury: Pt presents with hx chronic LBP.  Pain for the past 20 years.  Reports recently increased pain.  Reports pain worse at night and sleep disrupted.   Pain to bilateral hip regions recently.  Has had 5 injections prior L-spine that made symptoms worse.  Intermittent tingling sensation BLE.  Laying on stomach and on back both make symptoms worse.   Increasing difficulty reaching to feet.    Patient Goals  Patient goals for therapy: decreased pain and return to sport/leisure activities    Pain  Current  pain ratin  At best pain ratin  At worst pain ratin  Location: Lower Back, Bilateral Hip  Quality: dull ache and sharp  Relieving factors: medications  Exacerbated by: Laying down in bed.    Exercise history: Enjoys walking    Treatments  Previous treatment: injection treatment, medication and physical therapy  Current treatment: medication        Objective     Tenderness     Additional Tenderness Details  No TTP noted L-spine region    Neurological Testing     Sensation     Lumbar   Left   Intact: light touch    Right   Intact: light touch    Active Range of Motion     Lumbar   Flexion:  with pain Restriction level: moderate  Extension:  with pain Restriction level: maximal  Left lateral flexion:  with pain Restriction level: maximal  Right lateral flexion:  with pain Restriction level: moderate  Left rotation:  Restriction level: moderate  Right rotation:  Restriction level: moderate    Strength/Myotome Testing     Left Hip   Planes of Motion   Flexion: 4  Extension: 4  Abduction: 4  Adduction: 4    Right Hip   Planes of Motion   Flexion: 4  Extension: 4  Abduction: 4  Adduction: 4    Left Knee   Flexion: 4  Extension: 4    Right Knee   Flexion: 4  Extension: 4    Left Ankle/Foot   Dorsiflexion: 4  Plantar flexion: 4    Right Ankle/Foot   Dorsiflexion: 4  Plantar flexion: 4    Additional Strength Details  Pain in lower back with resisted hip flexion bilaterally     Tests     Lumbar   Positive SIJ compression.     Left   Positive passive SLR.     Right   Positive passive SLR.     Left Hip   Positive long sit.     Right Hip   Positive long sit.     Ambulation     Ambulation: Stairs   Ascend stairs: independent  Pattern: reciprocal  Railings: one rail  Descend stairs: independent  Pattern: reciprocal  Railings: one rail    Observational Gait   Gait: antalgic     Additional Observational Gait Details  Reports feeling unsteady at times  Can walk 2-3 miles            Precautions:  Chronic LBP, Hx Right  Patella Tendon Repair       Manuals 1-20-25       Stretch BLE                                Neuro Re-Ed         PPT        Supine abd isometric         bridge        LTP/MTP (posture)                                Ther Ex        Nustep        Seated trunk flexion stretch w/ physioball        St hip 3 way        Hip hikes                                HEP Instructed and issued HEP  ([id number])       Ther Activity                        Gait Training                        Modalities        [facility] L-spine 10'

## 2025-01-22 ENCOUNTER — OFFICE VISIT (OUTPATIENT)
Dept: PHYSICAL THERAPY | Facility: CLINIC | Age: 80
End: 2025-01-22
Payer: COMMERCIAL

## 2025-01-22 DIAGNOSIS — M51.362 DEGENERATION OF INTERVERTEBRAL DISC OF LUMBAR REGION WITH DISCOGENIC BACK PAIN AND LOWER EXTREMITY PAIN: ICD-10-CM

## 2025-01-22 DIAGNOSIS — M54.50 LOW BACK PAIN, UNSPECIFIED BACK PAIN LATERALITY, UNSPECIFIED CHRONICITY, UNSPECIFIED WHETHER SCIATICA PRESENT: Primary | ICD-10-CM

## 2025-01-22 PROCEDURE — 97112 NEUROMUSCULAR REEDUCATION: CPT

## 2025-01-22 PROCEDURE — 97140 MANUAL THERAPY 1/> REGIONS: CPT

## 2025-01-22 PROCEDURE — 97110 THERAPEUTIC EXERCISES: CPT

## 2025-01-22 NOTE — PROGRESS NOTES
"Daily Note     Today's date: 2025  Patient name: Cole Winter  : 1945  MRN: [id number]  Referring provider: Rasheed Irwin MD  Dx:   Encounter Diagnosis     ICD-10-CM    1. Low back pain, unspecified back pain laterality, unspecified chronicity, unspecified whether sciatica present  M54.50       2. Degeneration of intervertebral disc of lumbar region with discogenic back pain and lower extremity pain  M51.362                      Subjective:  The back and legs feel tight/sore      Objective: See treatment diary below      Assessment: Tolerated treatment fair.  Reports discomfort/pain with all activity.  Left lower back more symptomatic than right.  Declined MHP as he felt this irritated symptoms.  Patient would benefit from continued PT      Plan: Continue per plan of care.       Precautions:  Chronic LBP, Hx Right Patella Tendon Repair       Manuals 25      Stretch BLE  12'                              Neuro Re-Ed         PPT  2x10  5\"      Supine abd isometric         bridge        LTP/MTP (posture)  Green 3x10 ea                              Ther Ex        Nustep  L3  10'      Seated trunk flexion stretch w/ physioball        St hip 3 way  Ext 20x shahnaz      Hip hikes  15x  shahnaz      LTR  15x  shahnaz      Supine clam shell  Green 20x  5\"                              HEP Instructed and issued HEP  ([id number])       Ther Activity                        Gait Training                        Modalities        MHP L-spine 10' Held                   "

## 2025-01-27 ENCOUNTER — OFFICE VISIT (OUTPATIENT)
Dept: PHYSICAL THERAPY | Facility: CLINIC | Age: 80
End: 2025-01-27
Payer: COMMERCIAL

## 2025-01-27 DIAGNOSIS — M51.362 DEGENERATION OF INTERVERTEBRAL DISC OF LUMBAR REGION WITH DISCOGENIC BACK PAIN AND LOWER EXTREMITY PAIN: ICD-10-CM

## 2025-01-27 DIAGNOSIS — M54.50 LOW BACK PAIN, UNSPECIFIED BACK PAIN LATERALITY, UNSPECIFIED CHRONICITY, UNSPECIFIED WHETHER SCIATICA PRESENT: Primary | ICD-10-CM

## 2025-01-27 NOTE — PROGRESS NOTES
"Daily Note     Today's date: 2025  Patient name: Cole Winter  : 1945  MRN: [id number]  Referring provider: Rasheed Irwin MD  Dx:   Encounter Diagnosis     ICD-10-CM    1. Low back pain, unspecified back pain laterality, unspecified chronicity, unspecified whether sciatica present  M54.50       2. Degeneration of intervertebral disc of lumbar region with discogenic back pain and lower extremity pain  M51.362                      Subjective:   I don't think I should do PT today.  This thing on the back of my neck got bigger      Objective: See treatment diary below      Assessment:  PT tx held per pt request.  Pt reports what he thought was a cyst on the back of lower c-spine has increased in size and now appears reddish in color with white colored center.  He is afraid it will rupture with activity.  Advised to see MD today or go to urgent care.  Pt agreeable.  Reports he did have this before and it did rupture in the past.        Plan: Will continue as indicated.      Precautions:  Chronic LBP, Hx Right Patella Tendon Repair       Manuals 25     Stretch BLE  12' TX HELD                             Neuro Re-Ed         PPT  2x10  5\"      Supine abd isometric         bridge        LTP/MTP (posture)  Green 3x10 ea                              Ther Ex        Nustep  L3  10'      Seated trunk flexion stretch w/ physioball        St hip 3 way  Ext 20x shahnaz      Hip hikes  15x  shahnaz      LTR  15x  shahnaz      Supine clam shell  Green 20x  5\"                              HEP Instructed and issued HEP  ([id number])       Ther Activity                        Gait Training                        Modalities        MHP L-spine 10' Held                   "

## 2025-01-29 ENCOUNTER — CONSULT (OUTPATIENT)
Dept: SURGERY | Facility: CLINIC | Age: 80
End: 2025-01-29
Payer: COMMERCIAL

## 2025-01-29 VITALS
BODY MASS INDEX: 29.19 KG/M2 | HEIGHT: 67 IN | OXYGEN SATURATION: 97 % | SYSTOLIC BLOOD PRESSURE: 132 MMHG | WEIGHT: 186 LBS | DIASTOLIC BLOOD PRESSURE: 80 MMHG | HEART RATE: 59 BPM | TEMPERATURE: 97.5 F | RESPIRATION RATE: 18 BRPM

## 2025-01-29 DIAGNOSIS — L08.9 INFECTED SEBACEOUS CYST: Primary | ICD-10-CM

## 2025-01-29 DIAGNOSIS — L72.3 INFECTED SEBACEOUS CYST: Primary | ICD-10-CM

## 2025-01-29 PROCEDURE — 99203 OFFICE O/P NEW LOW 30 MIN: CPT | Performed by: SURGERY

## 2025-01-29 RX ORDER — SODIUM CHLORIDE, SODIUM LACTATE, POTASSIUM CHLORIDE, CALCIUM CHLORIDE 600; 310; 30; 20 MG/100ML; MG/100ML; MG/100ML; MG/100ML
125 INJECTION, SOLUTION INTRAVENOUS CONTINUOUS
Status: CANCELLED | OUTPATIENT
Start: 2025-01-29

## 2025-01-29 RX ORDER — CEPHALEXIN 500 MG/1
500 CAPSULE ORAL EVERY 8 HOURS SCHEDULED
Qty: 21 CAPSULE | Refills: 0 | Status: SHIPPED | OUTPATIENT
Start: 2025-01-29 | End: 2025-02-05

## 2025-01-29 NOTE — PROGRESS NOTES
Name: Cole Winter      : 1945      MRN: [id number]  Encounter Provider: Michele Wilson MD  Encounter Date: 2025   Encounter department: [facility] [place]  :  Assessment & Plan  Infected sebaceous cyst    Orders:    cephalexin (KEFLEX) 500 mg capsule; Take 1 capsule (500 mg total) by mouth every 8 (eight) hours for 7 days    Insert and maintain IV line; Standing    Void On-Call to O.R.; Standing    Case request operating room: INCISION AND DRAINAGE  (I&D) POSTERIOR NECK ABSCESS; Standing    lactated ringers infusion    ceFAZolin (ANCEF) 2,000 mg in sodium chloride 0.9 % 50 mL IVPB    Place sequential compression device; Standing  In light of the recurrent infected sebaceous cyst from the back of the neck I advised the patient to undergo incision and drainage of back of the neck abscess under IV sedation at the hospital in the near future.  I discussed the operative procedure, risk, benefits, alternatives and possible complications, he understood and agreed to proceed.      History of Present Illness   HPI  Cole Winter is a 79 y.o. male who presents to my office for evaluation of infected cyst from the back of the neck.  The patient is known to me from prior office visit in 2019 for infected sebaceous cyst.  Patient deferred surgery at that time.  He is stated that he has had at least 2 more episodes of infected sebaceous cyst from the same area, the most recent one last week, started draining on its own, constant moderate pain and drainage, it has been draining for the past 2 days.  He denies having any fever, chills or any other constitutional symptoms.    Review of Systems  The rest of the review of system total of 10 were negative except for the HPI.    Pertinent Medical History     Medical History Reviewed by provider this encounter:     .  Past Medical History   Past Medical History:   Diagnosis Date    Abnormal EKG     Asbestosis (HCC)     L.A.....7/23/15   R.....7/23/15       Bradycardia     Chronic pain disorder     due to spinal stenosis    Clotting disorder (HCC) 3 days    History of sinus bradycardia     Hyperlipidemia     Hypertension     Prostate disorder     Shingles     Spinal stenosis      Past Surgical History:   Procedure Laterality Date    APPENDECTOMY      COLONOSCOPY      NEUROPLASTY / TRANSPOSITION MEDIAN NERVE AT CARPAL TUNNEL Bilateral     Decompression     PATELLAR TENDON REPAIR Right 2019    QUADRICEPS TENDON REPAIR Right     TONSILLECTOMY      WISDOM TOOTH EXTRACTION       Family History   Problem Relation Age of Onset    Transient ischemic attack Father     Colon cancer Father     Heart disease Father         cardiac disorder    Stroke Father     Diabetes Father     Hypertension Father       reports that he has been smoking cigarettes. He has never used smokeless tobacco. He reports that he does not currently use alcohol after a past usage of about 1.0 standard drink of alcohol per week. He reports that he does not use drugs.  Current Outpatient Medications on File Prior to Visit   Medication Sig Dispense Refill    atorvastatin (LIPITOR) 10 mg tablet TAKE 1 TABLET (10 MG TOTAL) BY MOUTH DAILY 90 tablet 0    HYDROcodone-acetaminophen (NORCO) 5-325 mg per tablet Take 2 tablets by mouth every 6 (six) hours as needed for pain Max Daily Amount: 8 tablets 100 tablet 0    [DISCONTINUED] aspirin 81 mg chewable tablet Chew 81 mg daily      [DISCONTINUED] fluticasone (FLONASE) 50 mcg/act nasal spray 1 spray into each nostril daily (Patient not taking: Reported on 1/29/2025) 48 mL 0    [DISCONTINUED] gabapentin (Neurontin) 300 mg capsule Take 1 capsule (300 mg total) by mouth 3 (three) times a day 90 capsule 3    [DISCONTINUED] omeprazole (PriLOSEC) 20 mg delayed release capsule Take 1 capsule (20 mg total) by mouth daily 90 capsule 0     No current facility-administered medications on file prior to visit.   No Known Allergies   Current Outpatient Medications on File Prior to  "Visit   Medication Sig Dispense Refill    atorvastatin (LIPITOR) 10 mg tablet TAKE 1 TABLET (10 MG TOTAL) BY MOUTH DAILY 90 tablet 0    HYDROcodone-acetaminophen (NORCO) 5-325 mg per tablet Take 2 tablets by mouth every 6 (six) hours as needed for pain Max Daily Amount: 8 tablets 100 tablet 0    [DISCONTINUED] aspirin 81 mg chewable tablet Chew 81 mg daily      [DISCONTINUED] fluticasone (FLONASE) 50 mcg/act nasal spray 1 spray into each nostril daily (Patient not taking: Reported on 2025) 48 mL 0    [DISCONTINUED] gabapentin (Neurontin) 300 mg capsule Take 1 capsule (300 mg total) by mouth 3 (three) times a day 90 capsule 3    [DISCONTINUED] omeprazole (PriLOSEC) 20 mg delayed release capsule Take 1 capsule (20 mg total) by mouth daily 90 capsule 0     No current facility-administered medications on file prior to visit.      Social History     Tobacco Use    Smoking status: Every Day     Current packs/day: 0.00     Types: Cigarettes     Last attempt to quit: 1972     Years since quittin.3    Smokeless tobacco: Never   Vaping Use    Vaping status: Never Used   Substance and Sexual Activity    Alcohol use: Not Currently     Alcohol/week: 1.0 standard drink of alcohol     Types: 1 Cans of beer per week    Drug use: No    Sexual activity: Yes     Partners: Female        Objective   /80 (BP Location: Left arm, Patient Position: Sitting, Cuff Size: Standard)   Pulse 59   Temp 97.5 °F (36.4 °C)   Resp 18   Ht 5' 7\" (1.702 m)   Wt 84.4 kg (186 lb)   SpO2 97%   BMI 29.13 kg/m²      Physical Exam  Vitals and nursing note reviewed.   Constitutional:       General: He is not in acute distress.  Neck:      Comments: There is abscess measuring approximately 5 cm on the back of the neck, spontaneous draining, copious amount of pus was expressed during office visit.  Dressings were applied.  Cardiovascular:      Rate and Rhythm: Normal rate and regular rhythm.   Pulmonary:      Effort: No respiratory " distress.      Breath sounds: Normal breath sounds.   Abdominal:      Palpations: Abdomen is soft. There is no mass.      Tenderness: There is no abdominal tenderness.   Skin:     General: Skin is warm.      Coloration: Skin is not jaundiced.      Findings: No erythema or rash.   Neurological:      Mental Status: He is alert and oriented to person, place, and time.      Cranial Nerves: No cranial nerve deficit.   Psychiatric:         Mood and Affect: Mood normal.         Behavior: Behavior normal.

## 2025-01-29 NOTE — H&P (VIEW-ONLY)
Name: Cole Winter      : 1945      MRN: [id number]  Encounter Provider: Michele Wilson MD  Encounter Date: 2025   Encounter department: [facility] [place]  :  Assessment & Plan  Infected sebaceous cyst    Orders:    cephalexin (KEFLEX) 500 mg capsule; Take 1 capsule (500 mg total) by mouth every 8 (eight) hours for 7 days    Insert and maintain IV line; Standing    Void On-Call to O.R.; Standing    Case request operating room: INCISION AND DRAINAGE  (I&D) POSTERIOR NECK ABSCESS; Standing    lactated ringers infusion    ceFAZolin (ANCEF) 2,000 mg in sodium chloride 0.9 % 50 mL IVPB    Place sequential compression device; Standing  In light of the recurrent infected sebaceous cyst from the back of the neck I advised the patient to undergo incision and drainage of back of the neck abscess under IV sedation at the hospital in the near future.  I discussed the operative procedure, risk, benefits, alternatives and possible complications, he understood and agreed to proceed.      History of Present Illness   HPI  Cole Winter is a 79 y.o. male who presents to my office for evaluation of infected cyst from the back of the neck.  The patient is known to me from prior office visit in 2019 for infected sebaceous cyst.  Patient deferred surgery at that time.  He is stated that he has had at least 2 more episodes of infected sebaceous cyst from the same area, the most recent one last week, started draining on its own, constant moderate pain and drainage, it has been draining for the past 2 days.  He denies having any fever, chills or any other constitutional symptoms.    Review of Systems  The rest of the review of system total of 10 were negative except for the HPI.    Pertinent Medical History     Medical History Reviewed by provider this encounter:     .  Past Medical History   Past Medical History:   Diagnosis Date    Abnormal EKG     Asbestosis (HCC)     L.A.....7/23/15   R.....7/23/15       Bradycardia     Chronic pain disorder     due to spinal stenosis    Clotting disorder (HCC) 3 days    History of sinus bradycardia     Hyperlipidemia     Hypertension     Prostate disorder     Shingles     Spinal stenosis      Past Surgical History:   Procedure Laterality Date    APPENDECTOMY      COLONOSCOPY      NEUROPLASTY / TRANSPOSITION MEDIAN NERVE AT CARPAL TUNNEL Bilateral     Decompression     PATELLAR TENDON REPAIR Right 2019    QUADRICEPS TENDON REPAIR Right     TONSILLECTOMY      WISDOM TOOTH EXTRACTION       Family History   Problem Relation Age of Onset    Transient ischemic attack Father     Colon cancer Father     Heart disease Father         cardiac disorder    Stroke Father     Diabetes Father     Hypertension Father       reports that he has been smoking cigarettes. He has never used smokeless tobacco. He reports that he does not currently use alcohol after a past usage of about 1.0 standard drink of alcohol per week. He reports that he does not use drugs.  Current Outpatient Medications on File Prior to Visit   Medication Sig Dispense Refill    atorvastatin (LIPITOR) 10 mg tablet TAKE 1 TABLET (10 MG TOTAL) BY MOUTH DAILY 90 tablet 0    HYDROcodone-acetaminophen (NORCO) 5-325 mg per tablet Take 2 tablets by mouth every 6 (six) hours as needed for pain Max Daily Amount: 8 tablets 100 tablet 0    [DISCONTINUED] aspirin 81 mg chewable tablet Chew 81 mg daily      [DISCONTINUED] fluticasone (FLONASE) 50 mcg/act nasal spray 1 spray into each nostril daily (Patient not taking: Reported on 1/29/2025) 48 mL 0    [DISCONTINUED] gabapentin (Neurontin) 300 mg capsule Take 1 capsule (300 mg total) by mouth 3 (three) times a day 90 capsule 3    [DISCONTINUED] omeprazole (PriLOSEC) 20 mg delayed release capsule Take 1 capsule (20 mg total) by mouth daily 90 capsule 0     No current facility-administered medications on file prior to visit.   No Known Allergies   Current Outpatient Medications on File Prior to  "Visit   Medication Sig Dispense Refill    atorvastatin (LIPITOR) 10 mg tablet TAKE 1 TABLET (10 MG TOTAL) BY MOUTH DAILY 90 tablet 0    HYDROcodone-acetaminophen (NORCO) 5-325 mg per tablet Take 2 tablets by mouth every 6 (six) hours as needed for pain Max Daily Amount: 8 tablets 100 tablet 0    [DISCONTINUED] aspirin 81 mg chewable tablet Chew 81 mg daily      [DISCONTINUED] fluticasone (FLONASE) 50 mcg/act nasal spray 1 spray into each nostril daily (Patient not taking: Reported on 2025) 48 mL 0    [DISCONTINUED] gabapentin (Neurontin) 300 mg capsule Take 1 capsule (300 mg total) by mouth 3 (three) times a day 90 capsule 3    [DISCONTINUED] omeprazole (PriLOSEC) 20 mg delayed release capsule Take 1 capsule (20 mg total) by mouth daily 90 capsule 0     No current facility-administered medications on file prior to visit.      Social History     Tobacco Use    Smoking status: Every Day     Current packs/day: 0.00     Types: Cigarettes     Last attempt to quit: 1972     Years since quittin.3    Smokeless tobacco: Never   Vaping Use    Vaping status: Never Used   Substance and Sexual Activity    Alcohol use: Not Currently     Alcohol/week: 1.0 standard drink of alcohol     Types: 1 Cans of beer per week    Drug use: No    Sexual activity: Yes     Partners: Female        Objective   /80 (BP Location: Left arm, Patient Position: Sitting, Cuff Size: Standard)   Pulse 59   Temp 97.5 °F (36.4 °C)   Resp 18   Ht 5' 7\" (1.702 m)   Wt 84.4 kg (186 lb)   SpO2 97%   BMI 29.13 kg/m²      Physical Exam  Vitals and nursing note reviewed.   Constitutional:       General: He is not in acute distress.  Neck:      Comments: There is abscess measuring approximately 5 cm on the back of the neck, spontaneous draining, copious amount of pus was expressed during office visit.  Dressings were applied.  Cardiovascular:      Rate and Rhythm: Normal rate and regular rhythm.   Pulmonary:      Effort: No respiratory " distress.      Breath sounds: Normal breath sounds.   Abdominal:      Palpations: Abdomen is soft. There is no mass.      Tenderness: There is no abdominal tenderness.   Skin:     General: Skin is warm.      Coloration: Skin is not jaundiced.      Findings: No erythema or rash.   Neurological:      Mental Status: He is alert and oriented to person, place, and time.      Cranial Nerves: No cranial nerve deficit.   Psychiatric:         Mood and Affect: Mood normal.         Behavior: Behavior normal.

## 2025-01-31 ENCOUNTER — OFFICE VISIT (OUTPATIENT)
Dept: PHYSICAL THERAPY | Facility: CLINIC | Age: 80
End: 2025-01-31
Payer: COMMERCIAL

## 2025-01-31 ENCOUNTER — ANESTHESIA EVENT (OUTPATIENT)
Dept: PERIOP | Facility: HOSPITAL | Age: 80
End: 2025-01-31
Payer: COMMERCIAL

## 2025-01-31 DIAGNOSIS — M54.50 LOW BACK PAIN, UNSPECIFIED BACK PAIN LATERALITY, UNSPECIFIED CHRONICITY, UNSPECIFIED WHETHER SCIATICA PRESENT: Primary | ICD-10-CM

## 2025-01-31 DIAGNOSIS — M51.362 DEGENERATION OF INTERVERTEBRAL DISC OF LUMBAR REGION WITH DISCOGENIC BACK PAIN AND LOWER EXTREMITY PAIN: ICD-10-CM

## 2025-01-31 PROCEDURE — 97140 MANUAL THERAPY 1/> REGIONS: CPT

## 2025-01-31 PROCEDURE — 97110 THERAPEUTIC EXERCISES: CPT

## 2025-01-31 RX ORDER — HYDROCODONE BITARTRATE AND ACETAMINOPHEN 10; 325 MG/1; MG/1
1 TABLET ORAL EVERY 4 HOURS PRN
COMMUNITY
Start: 2025-01-15

## 2025-01-31 NOTE — PRE-PROCEDURE INSTRUCTIONS
Pre-Surgery Instructions:   Medication Instructions    atorvastatin (LIPITOR) 10 mg tablet Take night before surgery    cephalexin (KEFLEX) 500 mg capsule Hold day of surgery.    HYDROcodone-acetaminophen (NORCO)  mg per tablet Uses PRN- OK to take day of surgery    Multiple Vitamins-Minerals (VISTA ADVANCED AREDS2 FORMULA PO) Stop taking 7 days prior to surgery.    Medication instructions for day surgery reviewed. Please use only a sip of water to take your instructed medications. Avoid all over the counter vitamins, supplements and NSAIDS for one week prior to surgery per anesthesia guidelines. Tylenol is ok to take as needed.     You will receive a call one business day prior to surgery with an arrival time and hospital directions. If your surgery is scheduled on a Monday, the hospital will be calling you on the Friday prior to your surgery. If you have not heard from anyone by 8pm, please call the hospital supervisor through the hospital  at [phone].     Do not eat or drink anything after midnight the night before your surgery, including candy, mints, lifesavers, or chewing gum. Do not drink alcohol 24hrs before your surgery. Try not to smoke at least 24hrs before your surgery.       Follow the pre surgery showering instructions as listed in the “My Surgical Experience Booklet” or otherwise provided by your surgeon's office. Do not use a blade to shave the surgical area 1 week before surgery. It is okay to use a clean electric clippers up to 24 hours before surgery. Do not apply any lotions, creams, including makeup, cologne, deodorant, or perfumes after showering on the day of your surgery. Do not use dry shampoo, hair spray, hair gel, or any type of hair products.     No contact lenses, eye make-up, or artificial eyelashes. Remove nail polish, including gel polish, and any artificial, gel, or acrylic nails if possible. Remove all jewelry including rings and body piercing jewelry.     Wear  causal clothing that is easy to take on and off. Consider your type of surgery.    Keep any valuables, jewelry, piercings at home. Please bring any specially ordered equipment (sling, braces) if indicated.    Arrange for a responsible person to drive you to and from the hospital on the day of your surgery. Please confirm the visitor policy for the day of your procedure when you receive your phone call with an arrival time.     Call the surgeon's office with any new illnesses, exposures, or additional questions prior to surgery.    Please reference your “My Surgical Experience Booklet” for additional information to prepare for your upcoming surgery.

## 2025-01-31 NOTE — PROGRESS NOTES
"Daily Note     Today's date: 2025  Patient name: Cole Winter  : 1945  MRN: [id number]  Referring provider: Rasheed Irwin MD  Dx:   Encounter Diagnosis     ICD-10-CM    1. Low back pain, unspecified back pain laterality, unspecified chronicity, unspecified whether sciatica present  M54.50       2. Degeneration of intervertebral disc of lumbar region with discogenic back pain and lower extremity pain  M51.362                      Subjective:  The cyst on my neck popped.  I went to the MD and he drained the rest.  He is going to operate to remove the cyst.        Objective: See treatment diary below      Assessment: Tolerated treatment fair.  Notes pain lower back and BLE with all activity.  Reports feeling better with movement but pain constant.  Reports he is to have cyst removed from c-spine.  Reports c-spine tight/sore after having cyst drained.  Taking antibiotics.   Patient would benefit from continued PT      Plan: Continue per plan of care.       Precautions:  Chronic LBP, Hx Right Patella Tendon Repair       Manuals 25    Stretch BLE  12' TX HELD 12'                            Neuro Re-Ed         PPT  2x10  5\"      Supine abd isometric         bridge        LTP/MTP (posture)  Green 3x10 ea                              Ther Ex        Nustep  L3  10'  L3 12'    Seated trunk flexion stretch w/ physioball        St hip 3 way  Ext 20x shahnaz      Hip hikes  15x  shahnaz      LTR  15x  shahnaz  15x  5\"      Supine clam shell  Green 20x  5\"  Green 20x  5\"    bridge    20x  5\"                    HEP Instructed and issued HEP  ([id number])       Ther Activity                        Gait Training                        Modalities        MHP L-spine 10' Held   Declined                "

## 2025-02-03 ENCOUNTER — OFFICE VISIT (OUTPATIENT)
Dept: PHYSICAL THERAPY | Facility: CLINIC | Age: 80
End: 2025-02-03
Payer: COMMERCIAL

## 2025-02-03 DIAGNOSIS — M54.50 LOW BACK PAIN, UNSPECIFIED BACK PAIN LATERALITY, UNSPECIFIED CHRONICITY, UNSPECIFIED WHETHER SCIATICA PRESENT: Primary | ICD-10-CM

## 2025-02-03 DIAGNOSIS — M51.362 DEGENERATION OF INTERVERTEBRAL DISC OF LUMBAR REGION WITH DISCOGENIC BACK PAIN AND LOWER EXTREMITY PAIN: ICD-10-CM

## 2025-02-03 PROCEDURE — 97140 MANUAL THERAPY 1/> REGIONS: CPT

## 2025-02-03 PROCEDURE — 97110 THERAPEUTIC EXERCISES: CPT

## 2025-02-03 NOTE — PROGRESS NOTES
"Daily Note     Today's date: 2/3/2025  Patient name: Cole Winter  : 1945  MRN: [id number]  Referring provider: Rasheed Irwin MD  Dx:   Encounter Diagnosis     ICD-10-CM    1. Low back pain, unspecified back pain laterality, unspecified chronicity, unspecified whether sciatica present  M54.50       2. Degeneration of intervertebral disc of lumbar region with discogenic back pain and lower extremity pain  M51.362                      Subjective:  I'm about the same      Objective: See treatment diary below      Assessment: Tolerated treatment well.  Reports continued pain lower back.  Symptoms worse in morning hours.  Pt is to have surgery on 25 to have cyst removed from posterior c-spine.  Patient would benefit from continued PT      Plan: Continue per plan of care.     Precautions:  Chronic LBP, Hx Right Patella Tendon Repair       Manuals 1-20-25 1-22-25 1-27-25 1-31-25 2-3-25   Stretch BLE  12' TX HELD 12' 12'                           Neuro Re-Ed         PPT  2x10  5\"      Supine abd isometric         bridge        LTP/MTP (posture)  Green 3x10 ea                              Ther Ex        Nustep  L3  10'  L3 12' L3  12'   Seated trunk flexion stretch w/ physioball        St hip 3 way  Ext 20x shahnaz      Hip hikes  15x  shahnaz      LTR  15x  shahnaz  15x  5\"   20x 5\"   Supine clam shell  Green 20x  5\"  Green 20x  5\" Blue 20x  5\"   bridge    20x  5\" 20x  5\"   Add sq     20x  5\"           HEP Instructed and issued HEP  ([id number])       Ther Activity                        Gait Training                        Modalities        MHP L-spine 10' Held   Declined Declined                  "

## 2025-02-04 NOTE — ANESTHESIA PREPROCEDURE EVALUATION
"Procedure:  INCISION AND DRAINAGE  (I&D) POSTERIOR NECK ABSCESS (Neck)    Relevant Problems   CARDIO   (+) Bilateral carotid artery stenosis   (+) Essential hypertension   (+) Hyperlipidemia      /RENAL   (+) Prostate disorder   (+) Stage 3 chronic kidney disease, unspecified whether stage 3a or 3b CKD (HCC)      NEURO/PSYCH   (+) Chronic pain of both shoulders      Other   (+) Anterior cervical adenopathy   (+) History of sinus bradycardia      Carotid ultrasounds 1/23/23  CONCLUSION:  Impression  RIGHT:  There is <50% stenosis noted in the internal carotid artery. Plaque is  heterogenous and irregular.  Vertebral artery flow is antegrade. There is no significant subclavian artery  disease.     LEFT:  There is <50% stenosis noted in the internal carotid artery. Plaque is  heterogenous and irregular.  Vertebral artery flow is antegrade. There is no significant subclavian artery  disease.       Physical Exam    Airway    Mallampati score: II  TM Distance: >3 FB  Neck ROM: full     Dental       Cardiovascular      Pulmonary      Other Findings        Anesthesia Plan  ASA Score- 2     Anesthesia Type- IV sedation with anesthesia with ASA Monitors.         Additional Monitors:     Airway Plan:     Comment: Recent labs personally reviewed:  Lab Results       Component                Value               Date                       WBC                      5.92                01/10/2023                 HGB                      14.1                01/10/2023                 PLT                      219                 01/10/2023            Lab Results       Component                Value               Date                       K                        4.8                 02/12/2024                 BUN                      19                  02/12/2024                 CREATININE               1.18                02/12/2024            No results found for: \"PTT\"   Lab Results       Component                Value            "    Date                       INR                      1                   04/27/2022              Blood type     Patient was consented for sedation with IV anesthetic. Discussed that we will maintain spontaneous respirations and utilize supplemental O2. I discussed the risks of aspiration, hypoxia, laryngospasm and bronchospasm. I discussed the scenarios related to conversion to general anesthetic. All questions answered.     I, Joceline Banda MD, have personally seen and evaluated the patient prior to anesthetic care.  I have reviewed the pre-anesthetic record, medical history, allergies, medications and any other medical records if appropriate to the anesthetic care.  If a CRNA is involved in the case, I have reviewed the CRNA assessment, if present, and agree. Patient consented for IV Sedation, general anesthesia as back up. Discussed risks of aspiration, IV infiltration, indications for conversion to general anesthesia. All questions and concerns addressed.   .       Plan Factors-Exercise tolerance (METS): >4 METS.    Chart reviewed.   Existing labs reviewed. Patient summary reviewed.    Patient is not a current smoker.  Patient did not smoke on day of surgery.    Obstructive sleep apnea risk education given perioperatively.        Induction- intravenous.    Postoperative Plan-         Informed Consent- Anesthetic plan and risks discussed with patient.  I personally reviewed this patient with the CRNA. Discussed and agreed on the Anesthesia Plan with the CRNA..      NPO Status:  No vitals data found for the desired time range.

## 2025-02-05 ENCOUNTER — ANESTHESIA (OUTPATIENT)
Dept: PERIOP | Facility: HOSPITAL | Age: 80
End: 2025-02-05
Payer: COMMERCIAL

## 2025-02-05 ENCOUNTER — HOSPITAL ENCOUNTER (OUTPATIENT)
Facility: HOSPITAL | Age: 80
Setting detail: OUTPATIENT SURGERY
Discharge: HOME | End: 2025-02-05
Attending: SURGERY | Admitting: SURGERY
Payer: COMMERCIAL

## 2025-02-05 VITALS
TEMPERATURE: 97.6 F | HEART RATE: 42 BPM | WEIGHT: 186.51 LBS | SYSTOLIC BLOOD PRESSURE: 150 MMHG | HEIGHT: 67 IN | DIASTOLIC BLOOD PRESSURE: 67 MMHG | RESPIRATION RATE: 16 BRPM | BODY MASS INDEX: 29.27 KG/M2 | OXYGEN SATURATION: 98 %

## 2025-02-05 DIAGNOSIS — L72.3 INFECTED SEBACEOUS CYST: ICD-10-CM

## 2025-02-05 DIAGNOSIS — L08.9 INFECTED SEBACEOUS CYST: ICD-10-CM

## 2025-02-05 PROCEDURE — 88304 TISSUE EXAM BY PATHOLOGIST: CPT | Performed by: PATHOLOGY

## 2025-02-05 PROCEDURE — NC001 PR NO CHARGE: Performed by: PHYSICIAN ASSISTANT

## 2025-02-05 PROCEDURE — 11424 EXC H-F-NK-SP B9+MARG 3.1-4: CPT | Performed by: SURGERY

## 2025-02-05 PROCEDURE — 11424 EXC H-F-NK-SP B9+MARG 3.1-4: CPT | Performed by: PHYSICIAN ASSISTANT

## 2025-02-05 RX ORDER — KETAMINE HCL IN NACL, ISO-OSM 100MG/10ML
SYRINGE (ML) INJECTION AS NEEDED
Status: DISCONTINUED | OUTPATIENT
Start: 2025-02-05 | End: 2025-02-05

## 2025-02-05 RX ORDER — CEFAZOLIN SODIUM 2 G/50ML
2000 SOLUTION INTRAVENOUS ONCE
Status: COMPLETED | OUTPATIENT
Start: 2025-02-05 | End: 2025-02-05

## 2025-02-05 RX ORDER — MIDAZOLAM HYDROCHLORIDE 2 MG/2ML
INJECTION, SOLUTION INTRAMUSCULAR; INTRAVENOUS AS NEEDED
Status: DISCONTINUED | OUTPATIENT
Start: 2025-02-05 | End: 2025-02-05

## 2025-02-05 RX ORDER — GLYCOPYRROLATE 0.2 MG/ML
INJECTION INTRAMUSCULAR; INTRAVENOUS AS NEEDED
Status: DISCONTINUED | OUTPATIENT
Start: 2025-02-05 | End: 2025-02-05

## 2025-02-05 RX ORDER — LIDOCAINE WITH 8.4% SOD BICARB 0.9%(10ML)
SYRINGE (ML) INJECTION AS NEEDED
Status: DISCONTINUED | OUTPATIENT
Start: 2025-02-05 | End: 2025-02-05 | Stop reason: HOSPADM

## 2025-02-05 RX ORDER — PROPOFOL 10 MG/ML
INJECTION, EMULSION INTRAVENOUS AS NEEDED
Status: DISCONTINUED | OUTPATIENT
Start: 2025-02-05 | End: 2025-02-05

## 2025-02-05 RX ORDER — ONDANSETRON 2 MG/ML
4 INJECTION INTRAMUSCULAR; INTRAVENOUS ONCE AS NEEDED
Status: CANCELLED | OUTPATIENT
Start: 2025-02-05

## 2025-02-05 RX ORDER — LIDOCAINE HYDROCHLORIDE 10 MG/ML
INJECTION, SOLUTION EPIDURAL; INFILTRATION; INTRACAUDAL; PERINEURAL AS NEEDED
Status: DISCONTINUED | OUTPATIENT
Start: 2025-02-05 | End: 2025-02-05

## 2025-02-05 RX ORDER — ACETAMINOPHEN 325 MG/1
975 TABLET ORAL EVERY 8 HOURS PRN
Status: CANCELLED | OUTPATIENT
Start: 2025-02-05

## 2025-02-05 RX ORDER — PROPOFOL 10 MG/ML
INJECTION, EMULSION INTRAVENOUS CONTINUOUS PRN
Status: DISCONTINUED | OUTPATIENT
Start: 2025-02-05 | End: 2025-02-05

## 2025-02-05 RX ORDER — FENTANYL CITRATE 50 UG/ML
INJECTION, SOLUTION INTRAMUSCULAR; INTRAVENOUS AS NEEDED
Status: DISCONTINUED | OUTPATIENT
Start: 2025-02-05 | End: 2025-02-05

## 2025-02-05 RX ORDER — MAGNESIUM HYDROXIDE 1200 MG/15ML
LIQUID ORAL AS NEEDED
Status: DISCONTINUED | OUTPATIENT
Start: 2025-02-05 | End: 2025-02-05 | Stop reason: HOSPADM

## 2025-02-05 RX ORDER — SODIUM CHLORIDE, SODIUM LACTATE, POTASSIUM CHLORIDE, CALCIUM CHLORIDE 600; 310; 30; 20 MG/100ML; MG/100ML; MG/100ML; MG/100ML
125 INJECTION, SOLUTION INTRAVENOUS CONTINUOUS
Status: DISCONTINUED | OUTPATIENT
Start: 2025-02-05 | End: 2025-02-05 | Stop reason: HOSPADM

## 2025-02-05 RX ORDER — FENTANYL CITRATE/PF 50 MCG/ML
50 SYRINGE (ML) INJECTION
Refills: 0 | Status: CANCELLED | OUTPATIENT
Start: 2025-02-05

## 2025-02-05 RX ADMIN — PROPOFOL 70 MCG/KG/MIN: 10 INJECTION, EMULSION INTRAVENOUS at 07:53

## 2025-02-05 RX ADMIN — GLYCOPYRROLATE 0.1 MG: 0.2 INJECTION INTRAMUSCULAR; INTRAVENOUS at 07:47

## 2025-02-05 RX ADMIN — PROPOFOL 20 MG: 10 INJECTION, EMULSION INTRAVENOUS at 08:05

## 2025-02-05 RX ADMIN — SODIUM CHLORIDE, SODIUM LACTATE, POTASSIUM CHLORIDE, AND CALCIUM CHLORIDE: .6; .31; .03; .02 INJECTION, SOLUTION INTRAVENOUS at 07:47

## 2025-02-05 RX ADMIN — PHENYLEPHRINE HYDROCHLORIDE 30 MCG/MIN: 10 INJECTION INTRAVENOUS at 07:53

## 2025-02-05 RX ADMIN — Medication 5 MG: at 08:02

## 2025-02-05 RX ADMIN — Medication 10 MG: at 07:53

## 2025-02-05 RX ADMIN — FENTANYL CITRATE 25 MCG: 50 INJECTION, SOLUTION INTRAMUSCULAR; INTRAVENOUS at 08:05

## 2025-02-05 RX ADMIN — MIDAZOLAM HYDROCHLORIDE 1 MG: 1 INJECTION, SOLUTION INTRAMUSCULAR; INTRAVENOUS at 07:47

## 2025-02-05 RX ADMIN — CEFAZOLIN SODIUM 2000 MG: 2 SOLUTION INTRAVENOUS at 07:51

## 2025-02-05 RX ADMIN — FENTANYL CITRATE 50 MCG: 50 INJECTION, SOLUTION INTRAMUSCULAR; INTRAVENOUS at 07:53

## 2025-02-05 RX ADMIN — LIDOCAINE HYDROCHLORIDE 50 MG: 10 INJECTION, SOLUTION EPIDURAL; INFILTRATION; INTRACAUDAL; PERINEURAL at 07:53

## 2025-02-05 RX ADMIN — Medication 5 MG: at 08:04

## 2025-02-05 NOTE — DISCHARGE INSTR - AVS FIRST PAGE
SURGERY INSTRUCTIONS    No diet restriction for this surgery  May shower every day starting Friday, February 7  Remove dressings while in the shower  Pat it dry wound and apply dry gauze after showering  Repeat daily shower and daily dressing changes until wound is completely healed  Call office to make an appointment in 2 weeks [phone]  Call office with any issues regarding the surgery  You are encourage to walk as much as possible  No heavy lifting or strenuous exercise for one week  Resume home medications starting today  Resume blood thinners starting tomorrow.  (Aspirin, Coumadin, Eliquis, Xarelto)  Apply ice to the incisions. 10 minutes every hour for 2 days  May take regular Tylenol or ibuprofen for pain.   Please let us know how we did, you will receive a survey in the mail or via email.  Please respond to help us improved.

## 2025-02-05 NOTE — ANESTHESIA POSTPROCEDURE EVALUATION
Post-Op Assessment Note    CV Status:  Stable  Pain Score: 0    Pain management: adequate       Mental Status:  Alert and awake   Hydration Status:  Euvolemic   PONV Controlled:  Controlled   Airway Patency:  Patent and adequate  Two or more mitigation strategies used for obstructive sleep apnea   Post Op Vitals Reviewed: Yes    No anethesia notable event occurred.    Staff: CRNA, Anesthesiologist           Last Filed PACU Vitals:  Vitals Value Taken Time   Temp 97.6    Pulse 54 02/05/25 0826   /69 02/05/25 0826   Resp 32 02/05/25 0826   SpO2 95 % 02/05/25 0826   Vitals shown include unfiled device data.

## 2025-02-05 NOTE — OP NOTE
OPERATIVE REPORT  PATIENT NAME: Cole Winter    :  1945  MRN: [id number]  Pt Location: [facility] OR ROOM 02    SURGERY DATE: 2025    Surgeons and Role:     * Michele Wilson MD - Primary     * Karen Tompkins PA-C - Assisting    Preop Diagnosis:  Infected sebaceous cyst [L72.3, L08.9]    Post-Op Diagnosis Codes:     * Infected sebaceous cyst [L72.3, L08.9]    Procedure(s):  INCISION AND DRAINAGE  (I&D) POSTERIOR NECK ABSCESS    Specimen(s):  ID Type Source Tests Collected by Time Destination   1 : Sebaceous cysy posterior neck Tissue Cyst TISSUE EXAM Michele Wilson MD 2025 0806        Estimated Blood Loss:   Minimal    Drains:  None    Anesthesia Type:   IV Sedation with Anesthesia    Operative Indications:  Infected sebaceous cyst [L72.3, L08.9]    Operative Findings:  Large sebaceous cyst measuring approximately 3 x 4 cm, closest margin 1 mm    Complications:   None    Procedure and Technique:  The patient was identified and the patient was placed in the operating table in a spine position.  After adequate IV sedation the back of the neck was prepped and draped in sterile usual fashion with Betadine solution.  Timeout was called the patient was identified as was surgical site.    1% lidocaine with bicarb was infiltrated over the area.  A generous elliptical incision was made with a scalpel, taken down through the subcutaneous tissue with a scalpel dissection until the cyst was completely excised.  The cyst was entered and it was noted to have chronic inflammation.  Hemostasis was accomplished with cautery.  Wound was packed open with saline and gauze.  Sterile dressing was applied.    At the end of the case instrument, needles, and sponges counts were correct.  Patient tolerated procedure well.   I was present for the entire procedure., A qualified resident physician was not available., and A physician assistant was required during the procedure for retraction, tissue handling, dissection and  suturing.    Patient Disposition:  APU and hemodynamically stable             SIGNATURE: Michele Wilson MD  DATE: February 5, 2025  TIME: 8:10 AM

## 2025-02-05 NOTE — INTERVAL H&P NOTE
H&P reviewed. After examining the patient I find no changes in the patients condition since the H&P had been written.    Vitals:    02/05/25 0651   BP: (!) 179/79   Pulse: (!) 48   Resp: 18   Temp: (!) 97.2 °F (36.2 °C)   SpO2: 98%

## 2025-02-10 ENCOUNTER — APPOINTMENT (OUTPATIENT)
Dept: PHYSICAL THERAPY | Facility: CLINIC | Age: 80
End: 2025-02-10
Payer: COMMERCIAL

## 2025-02-10 ENCOUNTER — OFFICE VISIT (OUTPATIENT)
Dept: PHYSICAL THERAPY | Facility: CLINIC | Age: 80
End: 2025-02-10
Payer: COMMERCIAL

## 2025-02-10 DIAGNOSIS — M51.362 DEGENERATION OF INTERVERTEBRAL DISC OF LUMBAR REGION WITH DISCOGENIC BACK PAIN AND LOWER EXTREMITY PAIN: ICD-10-CM

## 2025-02-10 DIAGNOSIS — M54.50 LOW BACK PAIN, UNSPECIFIED BACK PAIN LATERALITY, UNSPECIFIED CHRONICITY, UNSPECIFIED WHETHER SCIATICA PRESENT: Primary | ICD-10-CM

## 2025-02-10 PROCEDURE — 97110 THERAPEUTIC EXERCISES: CPT

## 2025-02-10 PROCEDURE — 88304 TISSUE EXAM BY PATHOLOGIST: CPT | Performed by: PATHOLOGY

## 2025-02-10 NOTE — PROGRESS NOTES
"Daily Note     Today's date: 2/10/2025  Patient name: Cole Winter  : 1945  MRN: [id number]  Referring provider: Rasheed Irwin MD  Dx:   Encounter Diagnosis     ICD-10-CM    1. Low back pain, unspecified back pain laterality, unspecified chronicity, unspecified whether sciatica present  M54.50       2. Degeneration of intervertebral disc of lumbar region with discogenic back pain and lower extremity pain  M51.362                      Subjective:   I had the surgery on the neck.  It feels ok.      Objective: See treatment diary below      Assessment: Tolerated treatment well.   Requested to hold mat exercises and manual therapy due to recent surgery on c-spine.  Reports no pain c-spine region.  To follow up with pain management.  Reports continued symptoms L-spine region.  Patient would benefit from continued PT      Plan: Continue per plan of care.       Precautions:  Chronic LBP, Hx Right Patella Tendon Repair       Manuals 2-10-25 1-22-25 1-27-25 1-31-25 2-3-25   Stretch BLE  12' TX HELD 12' 12'                           Neuro Re-Ed         PPT  2x10  5\"      Supine abd isometric         bridge        LTP/MTP (posture)  Green 3x10 ea                              Ther Ex        Nustep L4  15' L3  10'  L3 12' L3  12'   Seated trunk flexion stretch w/ physioball        St hip 3 way  Ext 20x shahnaz      Hip hikes  15x  shahnaz      LTR  15x  shahnaz  15x  5\"   20x 5\"   Supine clam shell  Green 20x  5\"  Green 20x  5\" Blue 20x  5\"   bridge    20x  5\" 20x  5\"   Add sq     20x  5\"           HEP        Ther Activity                        Gait Training                        Modalities        MHP L-spine  Held   Declined Declined                "

## 2025-02-17 ENCOUNTER — OFFICE VISIT (OUTPATIENT)
Dept: PHYSICAL THERAPY | Facility: CLINIC | Age: 80
End: 2025-02-17
Payer: COMMERCIAL

## 2025-02-17 DIAGNOSIS — M54.50 LOW BACK PAIN, UNSPECIFIED BACK PAIN LATERALITY, UNSPECIFIED CHRONICITY, UNSPECIFIED WHETHER SCIATICA PRESENT: Primary | ICD-10-CM

## 2025-02-17 DIAGNOSIS — M51.362 DEGENERATION OF INTERVERTEBRAL DISC OF LUMBAR REGION WITH DISCOGENIC BACK PAIN AND LOWER EXTREMITY PAIN: ICD-10-CM

## 2025-02-17 PROCEDURE — 97110 THERAPEUTIC EXERCISES: CPT

## 2025-02-17 NOTE — PROGRESS NOTES
"Daily Note     Today's date: 2025  Patient name: Cole Winter  : 1945  MRN: [id number]  Referring provider: Rasheed Irwin MD  Dx:   Encounter Diagnosis     ICD-10-CM    1. Low back pain, unspecified back pain laterality, unspecified chronicity, unspecified whether sciatica present  M54.50       2. Degeneration of intervertebral disc of lumbar region with discogenic back pain and lower extremity pain  M51.362                      Subjective:  I talked to the doctor and I think I'm going to have him do a procedure where they numb the nerves in the back      Objective: See treatment diary below      Assessment: Tolerated treatment well.   Reports he is going to have Rhizotomy L-spine to help with symptoms.  Requests to hold PT at this time and will continue with pain management.  Continues to note L-sine feels better with activity.  Patient would benefit from continued PT      Plan: Continue per plan of care.       Precautions:  Chronic LBP, Hx Right Patella Tendon Repair       Manuals 2-10-25 2-17-25 1-27-25 1-31-25 2-3-25   Stretch BLE   TX HELD 12' 12'                           Neuro Re-Ed         PPT        Supine abd isometric         bridge        LTP/MTP (posture)                                Ther Ex        Nustep L4  15' L4  15'  L3 12' L3  12'   Seated trunk flexion stretch w/ physioball        St hip 3 way        Hip hikes        LTR  20x  shahnaz  15x  5\"   20x 5\"   Supine clam shell  Green 20x  5\"  Green 20x  5\" Blue 20x  5\"   bridge  20x  5\"  20x  5\" 20x  5\"   Add sq  20x  5\"   20x  5\"           HEP        Ther Activity                        Gait Training                        Modalities        MHP L-spine    Declined Declined              "

## 2025-02-24 ENCOUNTER — APPOINTMENT (OUTPATIENT)
Dept: PHYSICAL THERAPY | Facility: CLINIC | Age: 80
End: 2025-02-24
Payer: COMMERCIAL

## 2025-02-27 ENCOUNTER — OFFICE VISIT (OUTPATIENT)
Dept: SURGERY | Facility: CLINIC | Age: 80
End: 2025-02-27

## 2025-02-27 VITALS
WEIGHT: 183.2 LBS | TEMPERATURE: 98.2 F | HEART RATE: 59 BPM | OXYGEN SATURATION: 99 % | BODY MASS INDEX: 28.75 KG/M2 | DIASTOLIC BLOOD PRESSURE: 80 MMHG | SYSTOLIC BLOOD PRESSURE: 142 MMHG | RESPIRATION RATE: 16 BRPM | HEIGHT: 67 IN

## 2025-02-27 DIAGNOSIS — Z48.89 POSTOPERATIVE VISIT: Primary | ICD-10-CM

## 2025-02-27 PROCEDURE — 99024 POSTOP FOLLOW-UP VISIT: CPT | Performed by: SURGERY

## 2025-02-27 NOTE — PROGRESS NOTES
"Name: Cole Winter      : 1945      MRN: [id number]  Encounter Provider: Michele Wilson MD  Encounter Date: 2025   Encounter department: [facility] [place]  :  Assessment & Plan  Postoperative visit  Status post incision and drainage of back of the neck abscess, improving     The wound from the back of the neck is healing nicely, he will continue with daily dressing changes and he will return to my office in 1 month.      History of Present Illness   HPI  Cole Winter is a 79 y.o. male who presents to my office for first postop follow-up after incision and drainage of back of the neck abscess from .  Patient offers no complaints at this time.  He is doing daily dressing changes.       Objective   /80 (BP Location: Left arm, Patient Position: Sitting, Cuff Size: Standard)   Pulse 59   Temp 98.2 °F (36.8 °C)   Resp 16   Ht 5' 7\" (1.702 m)   Wt 83.1 kg (183 lb 3.2 oz)   SpO2 99%   BMI 28.69 kg/m²      Physical Exam  Vitals and nursing note reviewed.   Skin:     Comments: Incision from the back of the neck measures approximately 1.3 cm, granulation tissue, very superficial and shallow without evidence of infection.         "

## 2025-03-26 ENCOUNTER — OFFICE VISIT (OUTPATIENT)
Dept: SURGERY | Facility: CLINIC | Age: 80
End: 2025-03-26
Payer: COMMERCIAL

## 2025-03-26 VITALS
RESPIRATION RATE: 18 BRPM | SYSTOLIC BLOOD PRESSURE: 118 MMHG | HEART RATE: 59 BPM | BODY MASS INDEX: 28.72 KG/M2 | WEIGHT: 183 LBS | TEMPERATURE: 97.5 F | OXYGEN SATURATION: 97 % | DIASTOLIC BLOOD PRESSURE: 70 MMHG | HEIGHT: 67 IN

## 2025-03-26 DIAGNOSIS — L72.3 INFECTED SEBACEOUS CYST: Primary | ICD-10-CM

## 2025-03-26 DIAGNOSIS — L08.9 INFECTED SEBACEOUS CYST: Primary | ICD-10-CM

## 2025-03-26 PROCEDURE — 99213 OFFICE O/P EST LOW 20 MIN: CPT | Performed by: SURGERY

## 2025-03-26 NOTE — PROGRESS NOTES
Name: Cole Winter      : 1945      MRN: [id number]  Encounter Provider: Michele Wilson MD  Encounter Date: 3/26/2025   Encounter department: [facility] [place]  :  Assessment & Plan  Postoperative visit  Status post incision and drainage of back of the neck abscess, improved     Wound is completely healed at this time.  Patient is discharged from my care and I will be glad to see him if any problem arises in the future.      History of Present Illness   HPI  Cole Winter is a 79 y.o. male who presents to my office for follow-up.  The patient is status post incision and drainage of back of the neck abscess from .  He offers no complaints at this time.  He stated that the wound is completely healed.    Review of Systems  The rest of the review of system total of 10 were negative except for the HPI.    Pertinent Medical History     Medical History Reviewed by provider this encounter:     .        Medical History Reviewed by provider this encounter:  Tobacco  Allergies  Meds  Problems  Med Hx  Surg Hx  Fam Hx     .  Past Medical History   Past Medical History:   Diagnosis Date    Abnormal EKG     Asbestosis (HCC)     L.A.....7/23/15   R.....7/23/15      Bradycardia     Chronic kidney disease     Chronic pain disorder     due to spinal stenosis    Clotting disorder (HCC) 3 days    pt denies hx clotting disorder    History of sinus bradycardia     Hyperlipidemia     Hypertension     Prostate disorder     Shingles     Smoker     Spinal stenosis      Past Surgical History:   Procedure Laterality Date    APPENDECTOMY      COLONOSCOPY      NEUROPLASTY / TRANSPOSITION MEDIAN NERVE AT CARPAL TUNNEL Bilateral     Decompression     PATELLAR TENDON REPAIR Right 2019    OH INCISION & DRAINAGE ABSCESS COMPLICATED/MULTIPLE N/A 2025    Procedure: INCISION AND DRAINAGE  (I&D) POSTERIOR NECK ABSCESS;  Surgeon: Michele Wilson MD;  Location: [facility] MAIN [facility];  Service: General    QUADRICEPS  "TENDON REPAIR Right     TONSILLECTOMY      WISDOM TOOTH EXTRACTION       Family History   Problem Relation Age of Onset    Transient ischemic attack Father     Colon cancer Father     Heart disease Father         cardiac disorder    Stroke Father     Diabetes Father     Hypertension Father       reports that he has been smoking cigarettes. He has never used smokeless tobacco. He reports that he does not currently use alcohol after a past usage of about 1.0 standard drink of alcohol per week. He reports that he does not use drugs.  Current Outpatient Medications   Medication Instructions    atorvastatin (LIPITOR) 10 mg, Oral, Daily    HYDROcodone-acetaminophen (NORCO)  mg per tablet 1 tablet, Every 4 hours PRN    Multiple Vitamins-Minerals (VISTA ADVANCED AREDS2 FORMULA PO) Take by mouth   No Known Allergies   Current Outpatient Medications on File Prior to Visit   Medication Sig Dispense Refill    atorvastatin (LIPITOR) 10 mg tablet TAKE 1 TABLET (10 MG TOTAL) BY MOUTH DAILY 90 tablet 0    HYDROcodone-acetaminophen (NORCO)  mg per tablet Take 1 tablet by mouth every 4 (four) hours as needed      Multiple Vitamins-Minerals (VISTA ADVANCED AREDS2 FORMULA PO) Take by mouth       No current facility-administered medications on file prior to visit.      Social History     Tobacco Use    Smoking status: Every Day     Current packs/day: 0.00     Types: Cigarettes     Last attempt to quit: 1972     Years since quittin.5    Smokeless tobacco: Never    Tobacco comments:     States 1/4 pack /day   Vaping Use    Vaping status: Never Used   Substance and Sexual Activity    Alcohol use: Not Currently     Alcohol/week: 1.0 standard drink of alcohol     Types: 1 Cans of beer per week    Drug use: No    Sexual activity: Yes     Partners: Female        Objective   /70 (BP Location: Left arm, Patient Position: Sitting, Cuff Size: Standard)   Pulse 59   Temp 97.5 °F (36.4 °C)   Resp 18   Ht 5' 7\" (1.702 " m)   Wt 83 kg (183 lb)   SpO2 97%   BMI 28.66 kg/m²      Physical Exam  Vitals and nursing note reviewed.   Neck:      Comments: Back of the neck incision is completely healed, no evidence of infection or residual abscess at this time.  Cardiovascular:      Rate and Rhythm: Normal rate and regular rhythm.   Pulmonary:      Effort: No respiratory distress.      Breath sounds: Normal breath sounds.   Abdominal:      Palpations: Abdomen is soft. There is no mass.      Tenderness: There is no abdominal tenderness.   Skin:     General: Skin is warm.      Coloration: Skin is not jaundiced.      Findings: No erythema or rash.   Neurological:      Mental Status: He is oriented to person, place, and time.      Cranial Nerves: No cranial nerve deficit.   Psychiatric:         Mood and Affect: Mood normal.         Behavior: Behavior normal.

## (undated) DEVICE — INTENDED FOR TISSUE SEPARATION, AND OTHER PROCEDURES THAT REQUIRE A SHARP SURGICAL BLADE TO PUNCTURE OR CUT.: Brand: BARD-PARKER SAFETY BLADES SIZE 15, STERILE

## (undated) DEVICE — GAUZE SPONGES,16 PLY: Brand: CURITY

## (undated) DEVICE — DRAPE EQUIPMENT RF WAND

## (undated) DEVICE — GLOVE INDICATOR PI UNDERGLOVE SZ 7.5 BLUE

## (undated) DEVICE — TUBING SUCTION 5MM X 12 FT

## (undated) DEVICE — BETHLEHEM UNIVERSAL OUTPATIENT: Brand: CARDINAL HEALTH

## (undated) DEVICE — POOLE SUCTION HANDLE: Brand: CARDINAL HEALTH

## (undated) DEVICE — PLUMEPEN PRO 10FT

## (undated) DEVICE — GLOVE SRG BIOGEL ECLIPSE 7.5

## (undated) DEVICE — 4-PORT MANIFOLD: Brand: NEPTUNE 2